# Patient Record
Sex: MALE | Race: WHITE | NOT HISPANIC OR LATINO | Employment: OTHER | URBAN - METROPOLITAN AREA
[De-identification: names, ages, dates, MRNs, and addresses within clinical notes are randomized per-mention and may not be internally consistent; named-entity substitution may affect disease eponyms.]

---

## 2017-12-19 ENCOUNTER — TELEPHONE (OUTPATIENT)
Dept: ADMINISTRATIVE | Facility: CLINIC | Age: 77
End: 2017-12-19

## 2018-01-22 ENCOUNTER — TELEPHONE (OUTPATIENT)
Dept: FAMILY MEDICINE | Facility: CLINIC | Age: 78
End: 2018-01-22

## 2018-01-22 NOTE — TELEPHONE ENCOUNTER
Spoke with Danisha and let her we need to see the pt , he has not been seen since 2015.   She will call the pt's daughter and let her know

## 2018-01-22 NOTE — TELEPHONE ENCOUNTER
----- Message from Mitra Walters sent at 1/22/2018  2:37 PM CST -----  Contact: [facility]  [phone] - Danisha Jhaveri ,,,, following up on a medical request calling for a order for hospice ,,daughter is in from out of town and wants to get his done,,,,,,Please call back at

## 2018-01-24 ENCOUNTER — TELEPHONE (OUTPATIENT)
Dept: FAMILY MEDICINE | Facility: CLINIC | Age: 78
End: 2018-01-24

## 2018-01-24 NOTE — TELEPHONE ENCOUNTER
----- Message from Juanita Alcaraz sent at 1/24/2018  2:02 PM CST -----  [facility]  [facility]'s calling stating that pt's heart rate was higher than normal today at 126, and bp at 140/90, she stated that currently he is at 108, would also like a order for a  for pt, please advise [phone]

## 2018-01-24 NOTE — TELEPHONE ENCOUNTER
I know these, I have told them that. Christi has been working on them as well. The daughter just came into town today

## 2018-01-24 NOTE — TELEPHONE ENCOUNTER
Spoke with Christi and she says that the pt's heartrate was more elevated today than usual , it was 126 but his daughter was there and she was telling him that he needed to come see us so we could put him in the nursing home. He did not like that and became very agitated. Christi had him take an Ativan that he has for anxiety and he was feeling better and HR was 94 when she left   Still trying to get  Him to come in for a visit ,l his daughter is having a hard time trying to decide between hospice and nursing home, they are requesting a  consult

## 2018-01-26 ENCOUNTER — TELEPHONE (OUTPATIENT)
Dept: FAMILY MEDICINE | Facility: CLINIC | Age: 78
End: 2018-01-26

## 2018-01-26 NOTE — TELEPHONE ENCOUNTER
----- Message from Nancy Canada sent at 1/26/2018  8:45 AM CST -----  Contact: Rick - daughter  Asked that the pt is worked in the afternoon for a F/U/Regarding pt going on Hospice appt, the pt daughter can be reached at  [phone] or [phone]///[email or web address]

## 2018-01-26 NOTE — TELEPHONE ENCOUNTER
Spoke with patients daughter and informed her he will need an appointment for evaluation for hospice. Verbalized understanding. Appointment scheduled.

## 2018-01-29 ENCOUNTER — OFFICE VISIT (OUTPATIENT)
Dept: FAMILY MEDICINE | Facility: CLINIC | Age: 78
End: 2018-01-29
Payer: MEDICARE

## 2018-01-29 VITALS
TEMPERATURE: 97 F | DIASTOLIC BLOOD PRESSURE: 62 MMHG | SYSTOLIC BLOOD PRESSURE: 112 MMHG | HEART RATE: 94 BPM | OXYGEN SATURATION: 97 %

## 2018-01-29 DIAGNOSIS — E66.01 MORBID OBESITY: Chronic | ICD-10-CM

## 2018-01-29 DIAGNOSIS — I69.30 HISTORY OF CEREBROVASCULAR ACCIDENT (CVA) WITH RESIDUAL DEFICIT: ICD-10-CM

## 2018-01-29 DIAGNOSIS — I27.9 CHRONIC PULMONARY HEART DISEASE: Chronic | ICD-10-CM

## 2018-01-29 DIAGNOSIS — F41.9 ANXIETY: ICD-10-CM

## 2018-01-29 DIAGNOSIS — Z12.11 COLON CANCER SCREENING: ICD-10-CM

## 2018-01-29 DIAGNOSIS — J44.9 PULMONARY HYPERTENSION DUE TO CHRONIC OBSTRUCTIVE PULMONARY DISEASE: Primary | Chronic | ICD-10-CM

## 2018-01-29 DIAGNOSIS — I27.23 PULMONARY HYPERTENSION DUE TO CHRONIC OBSTRUCTIVE PULMONARY DISEASE: Primary | Chronic | ICD-10-CM

## 2018-01-29 DIAGNOSIS — I70.0 ATHEROSCLEROSIS OF AORTA: Chronic | ICD-10-CM

## 2018-01-29 DIAGNOSIS — F10.11 ALCOHOL ABUSE, IN REMISSION: ICD-10-CM

## 2018-01-29 DIAGNOSIS — I51.89 LEFT VENTRICULAR DIASTOLIC DYSFUNCTION WITH PRESERVED SYSTOLIC FUNCTION: Chronic | ICD-10-CM

## 2018-01-29 PROCEDURE — 1126F AMNT PAIN NOTED NONE PRSNT: CPT | Mod: S$GLB,,, | Performed by: FAMILY MEDICINE

## 2018-01-29 PROCEDURE — 99214 OFFICE O/P EST MOD 30 MIN: CPT | Mod: S$GLB,,, | Performed by: FAMILY MEDICINE

## 2018-01-29 PROCEDURE — 99499 UNLISTED E&M SERVICE: CPT | Mod: S$GLB,,, | Performed by: FAMILY MEDICINE

## 2018-01-29 PROCEDURE — 99999 PR PBB SHADOW E&M-EST. PATIENT-LVL II: CPT | Mod: PBBFAC,,, | Performed by: FAMILY MEDICINE

## 2018-01-29 PROCEDURE — 1159F MED LIST DOCD IN RCRD: CPT | Mod: S$GLB,,, | Performed by: FAMILY MEDICINE

## 2018-01-29 NOTE — PROGRESS NOTES
Chief Complaint:    Chief Complaint   Patient presents with    Discuss Hospice       History of Present Illness:  Patient presents today after nearly 2 years to discuss hospice care.  He has been under hospice care on some time, he does have a diagnosis of diastolic dysfunction with preserved systolic function, COPD, history of CVA affecting the left side, he did have atrial fibrillation and used to be on anticoagulation but he's not on any anticoagulation for some reason.    He does have history of alcohol abuse but he says he is not drinking any this time.          ROS:  Review of Systems   Constitutional: Negative for activity change, chills, fatigue, fever and unexpected weight change.   HENT: Negative for congestion, ear discharge, ear pain, hearing loss, postnasal drip and rhinorrhea.    Eyes: Negative for pain and visual disturbance.   Respiratory: Negative for cough, chest tightness and shortness of breath.    Cardiovascular: Negative for chest pain and palpitations.   Gastrointestinal: Negative for abdominal pain, diarrhea and vomiting.   Endocrine: Negative for heat intolerance.   Genitourinary: Negative for dysuria, flank pain, frequency and hematuria.   Musculoskeletal: Negative for back pain, gait problem and neck pain.   Skin: Negative for color change and rash.   Neurological: Negative for dizziness, tremors, seizures, numbness and headaches.   Psychiatric/Behavioral: Negative for agitation, hallucinations, self-injury, sleep disturbance and suicidal ideas. The patient is not nervous/anxious.        Past Medical History:   Diagnosis Date    *Atrial fibrillation     Anemia due to blood loss, chronic 10/15/2013    Anxiety     Bilateral carotid artery disease 6/2/2016    CHF (congestive heart failure)     Depression     Hyperlipidemia     Hypertension     Prostate hypertrophy     Sleep apnea 9/12/2013    Stroke     Venous insufficiency 9/12/2013       Social History:  Social History      Social History    Marital status:      Spouse name: N/A    Number of children: N/A    Years of education: N/A     Social History Main Topics    Smoking status: Former Smoker     Packs/day: 0.50     Years: 20.00     Types: Cigarettes     Quit date: 1/1/1998    Smokeless tobacco: Never Used    Alcohol use Yes      Comment: social use of alcohol    Drug use: No    Sexual activity: Not Currently     Birth control/ protection: None     Other Topics Concern    None     Social History Narrative    None       Family History:   family history includes Diabetes in his mother, sister, sister, and sister; Heart disease in his mother; Hypertension in his brother.    Health Maintenance   Topic Date Due    TETANUS VACCINE  03[birth year]11/1958    Zoster Vaccine  03/11/2000    Colonoscopy  10/18/2014    Influenza Vaccine  08/01/2017    Lipid Panel  03/09/2021    Pneumococcal (65+)  Completed       Physical Exam:    Vital Signs  Temp: 96.6 °F (35.9 °C)  Temp src: Tympanic  Pulse: 94  SpO2: 97 %  BP: 112/62  BP Location: Right arm  Patient Position: Sitting  Pain Score: 0-No pain]    There is no height or weight on file to calculate BMI.    Physical Exam   Constitutional: He appears well-developed.   HENT:   Mouth/Throat: Oropharynx is clear and moist.   Eyes: Conjunctivae are normal. Pupils are equal, round, and reactive to light.   Neck: Normal range of motion. Neck supple.   Cardiovascular: Normal rate, regular rhythm and normal heart sounds.    No murmur heard.  Pulmonary/Chest: Effort normal and breath sounds normal. No respiratory distress. He has no wheezes. He has no rales. He exhibits no tenderness.   Abdominal: Soft. He exhibits no distension and no mass. There is no tenderness. There is no guarding.   Musculoskeletal: He exhibits no edema or tenderness.   Patient has impaired mobility uses wheelchair for the most part, high risk for falls without the wheelchair.   Lymphadenopathy:     He has no cervical  adenopathy.   Neurological: He is alert. He has normal reflexes.   Some spasticity involving the left upper extremity and weakness involving the left upper and lower extremity.   Skin: Skin is warm and dry.   Psychiatric: He has a normal mood and affect. His behavior is normal. Judgment and thought content normal.       Lab Results   Component Value Date    CHOL 145 03/09/2016    CHOL 151 11/16/2015    CHOL 131 03/19/2015    TRIG 102 03/09/2016    TRIG 70 11/16/2015    TRIG 72 06/09/2015    HDL 38 (L) 03/09/2016    HDL 36 (L) 11/16/2015    HDL 37 (L) 03/19/2015    TOTALCHOLEST 3.8 03/09/2016    TOTALCHOLEST 4.2 11/16/2015    TOTALCHOLEST 3.5 03/19/2015    NONHDLCHOL 107 03/09/2016    NONHDLCHOL 115 11/16/2015    NONHDLCHOL 94 03/19/2015       Lab Results   Component Value Date    HGBA1C 6.3 (H) 03/19/2015       Assessment:      ICD-10-CM ICD-9-CM   1. Pulmonary hypertension due to chronic obstructive pulmonary disease I27.23 416.8    J44.9 496   2. Left ventricular diastolic dysfunction with preserved systolic function I51.9 428.30   3. Atherosclerosis of aorta I70.0 440.0   4. Colon cancer screening Z12.11 V76.51   5. Anxiety F41.9 300.00   6. Alcohol abuse, in remission F10.11 305.03   7. Chronic pulmonary heart disease I27.9 416.9   8. Morbid obesity E66.01 278.01   9. History of cerebrovascular accident (CVA) with residual deficit I69.30 438.9         Plan:  Patient has several comorbid conditions requiring constant care especially with his limited mobility and weakness involving the left side due to CVA, COPD, diastolic heart failure he will need some assistance with activities of daily living.  I will see why he would be a hospice candidate however.  Home health would be appropriate in this individual to monitor him vital signs, he will need a aide to help with activities of daily living.  We'll check labs  Orders Placed This Encounter   Procedures    Fecal Immunochemical Test (iFOBT)    CBC auto  differential    Comprehensive metabolic panel    Lipid panel    Brain natriuretic peptide       Current Outpatient Prescriptions   Medication Sig Dispense Refill    aspirin (ECOTRIN) 81 MG EC tablet Take 1 tablet (81 mg total) by mouth once daily. (Patient taking differently: Take 325 mg by mouth once daily. ) 30 tablet 0    diltiazem (CARDIZEM CD) 120 MG Cp24 Take 1 capsule (120 mg total) by mouth once daily. 90 capsule 3    furosemide (LASIX) 40 MG tablet Take 1 tablet (40 mg total) by mouth daily as needed. (Patient taking differently: Take 40 mg by mouth once daily. ) 20 tablet 0    lorazepam (ATIVAN) 1 MG tablet Take 1 tablet by mouth every 6 (six) hours as needed.  0    oxybutynin (DITROPAN-XL) 10 MG 24 hr tablet Take 1 tablet (10 mg total) by mouth once daily. (Patient taking differently: Take 10 mg by mouth once daily. ) 30 tablet 11    sertraline (ZOLOFT) 50 MG tablet Take 1 tablet (50 mg total) by mouth once daily. 90 tablet 3    folic acid (FOLVITE) 1 MG tablet Take 1 tablet (1 mg total) by mouth once daily. 30 tablet 0    OXYGEN-AIR DELIVERY SYSTEMS [facility]        No current facility-administered medications for this visit.        Medications Discontinued During This Encounter   Medication Reason    DILT- mg CDCR Duplicate Order    tamsulosin (FLOMAX) 0.4 mg Cp24 Patient no longer taking    zinc sulfate (ZINCATE) 220 (50) mg capsule Patient no longer taking    thiamine 100 MG tablet Patient no longer taking    pravastatin (PRAVACHOL) 40 MG tablet Patient no longer taking    multivitamin (THERAGRAN) tablet Patient no longer taking    miconazole NITRATE 2 % (MICOTIN) 2 % top powder Patient no longer taking    fosinopril (MONOPRIL) 20 MG tablet Patient no longer taking    atenolol (TENORMIN) 100 MG tablet Patient no longer taking       No Follow-up on file.      Addi Saldaña MD

## 2018-01-30 ENCOUNTER — LAB VISIT (OUTPATIENT)
Dept: LAB | Facility: HOSPITAL | Age: 78
End: 2018-01-30
Attending: FAMILY MEDICINE
Payer: MEDICARE

## 2018-01-30 ENCOUNTER — CLINICAL SUPPORT (OUTPATIENT)
Dept: FAMILY MEDICINE | Facility: CLINIC | Age: 78
End: 2018-01-30
Payer: MEDICARE

## 2018-01-30 DIAGNOSIS — J44.9 PULMONARY HYPERTENSION DUE TO CHRONIC OBSTRUCTIVE PULMONARY DISEASE: Chronic | ICD-10-CM

## 2018-01-30 DIAGNOSIS — Z12.11 COLON CANCER SCREENING: ICD-10-CM

## 2018-01-30 DIAGNOSIS — I27.23 PULMONARY HYPERTENSION DUE TO CHRONIC OBSTRUCTIVE PULMONARY DISEASE: Chronic | ICD-10-CM

## 2018-01-30 LAB
ALBUMIN SERPL BCP-MCNC: 3.1 G/DL
ALP SERPL-CCNC: 77 U/L
ALT SERPL W/O P-5'-P-CCNC: 17 U/L
ANION GAP SERPL CALC-SCNC: 8 MMOL/L
AST SERPL-CCNC: 17 U/L
BASOPHILS # BLD AUTO: 0.02 K/UL
BASOPHILS NFR BLD: 0.4 %
BILIRUB SERPL-MCNC: 0.8 MG/DL
BUN SERPL-MCNC: 14 MG/DL
CALCIUM SERPL-MCNC: 8.7 MG/DL
CHLORIDE SERPL-SCNC: 104 MMOL/L
CHOLEST SERPL-MCNC: 194 MG/DL
CHOLEST/HDLC SERPL: 4.7 {RATIO}
CO2 SERPL-SCNC: 28 MMOL/L
CREAT SERPL-MCNC: 0.9 MG/DL
DIFFERENTIAL METHOD: ABNORMAL
EOSINOPHIL # BLD AUTO: 0.1 K/UL
EOSINOPHIL NFR BLD: 2.6 %
ERYTHROCYTE [DISTWIDTH] IN BLOOD BY AUTOMATED COUNT: 14.5 %
EST. GFR  (AFRICAN AMERICAN): >60 ML/MIN/1.73 M^2
EST. GFR  (NON AFRICAN AMERICAN): >60 ML/MIN/1.73 M^2
GLUCOSE SERPL-MCNC: 136 MG/DL
HCT VFR BLD AUTO: 43.7 %
HDLC SERPL-MCNC: 41 MG/DL
HDLC SERPL: 21.1 %
HGB BLD-MCNC: 14.5 G/DL
IMM GRANULOCYTES # BLD AUTO: 0.01 K/UL
IMM GRANULOCYTES NFR BLD AUTO: 0.2 %
LDLC SERPL CALC-MCNC: 135.8 MG/DL
LYMPHOCYTES # BLD AUTO: 1.4 K/UL
LYMPHOCYTES NFR BLD: 25.2 %
MCH RBC QN AUTO: 31.4 PG
MCHC RBC AUTO-ENTMCNC: 33.2 G/DL
MCV RBC AUTO: 95 FL
MONOCYTES # BLD AUTO: 0.5 K/UL
MONOCYTES NFR BLD: 9.1 %
NEUTROPHILS # BLD AUTO: 3.4 K/UL
NEUTROPHILS NFR BLD: 62.5 %
NONHDLC SERPL-MCNC: 153 MG/DL
NRBC BLD-RTO: 0 /100 WBC
PLATELET # BLD AUTO: 160 K/UL
PMV BLD AUTO: 12.1 FL
POTASSIUM SERPL-SCNC: 4 MMOL/L
PROT SERPL-MCNC: 7.7 G/DL
RBC # BLD AUTO: 4.62 M/UL
SODIUM SERPL-SCNC: 140 MMOL/L
TRIGL SERPL-MCNC: 86 MG/DL
WBC # BLD AUTO: 5.48 K/UL

## 2018-01-30 PROCEDURE — 36415 COLL VENOUS BLD VENIPUNCTURE: CPT | Mod: PO

## 2018-01-30 PROCEDURE — 82274 ASSAY TEST FOR BLOOD FECAL: CPT

## 2018-01-30 PROCEDURE — 90662 IIV NO PRSV INCREASED AG IM: CPT | Mod: S$GLB,,, | Performed by: FAMILY MEDICINE

## 2018-01-30 PROCEDURE — G0008 ADMIN INFLUENZA VIRUS VAC: HCPCS | Mod: S$GLB,,, | Performed by: FAMILY MEDICINE

## 2018-01-30 PROCEDURE — 80053 COMPREHEN METABOLIC PANEL: CPT

## 2018-01-30 PROCEDURE — 80061 LIPID PANEL: CPT

## 2018-01-30 PROCEDURE — 85025 COMPLETE CBC W/AUTO DIFF WBC: CPT

## 2018-01-30 PROCEDURE — 83880 ASSAY OF NATRIURETIC PEPTIDE: CPT

## 2018-01-30 NOTE — PROGRESS NOTES
High dose fluzone given IM rt deltoid , pt tolerated well ,Recommended 15 minute wait to watch for adverse reactions

## 2018-01-31 LAB — BNP SERPL-MCNC: 99 PG/ML

## 2018-02-02 LAB — HEMOCCULT STL QL IA: NEGATIVE

## 2018-02-05 ENCOUNTER — TELEPHONE (OUTPATIENT)
Dept: FAMILY MEDICINE | Facility: CLINIC | Age: 78
End: 2018-02-05

## 2018-02-05 DIAGNOSIS — G89.29 OTHER CHRONIC PAIN: Primary | ICD-10-CM

## 2018-02-05 NOTE — TELEPHONE ENCOUNTER
I need to know what is the terminal condition that she thinks this patient has?  I have not seen any terminal condition that would justify hospice care.

## 2018-02-05 NOTE — TELEPHONE ENCOUNTER
----- Message from Socorro Canada sent at 2/5/2018  7:33 AM CST -----  Contact: pt daughter-Marialuisa  Calling to get test results. Please adv/call [phone].//thanks. cw

## 2018-02-05 NOTE — TELEPHONE ENCOUNTER
Daughter is still requesting hospice , she believes that he is terminal and needs a 24 hour sitters to take care of him and you need to order it, she will only be here a couple of more days and needs this squared away

## 2018-02-06 NOTE — TELEPHONE ENCOUNTER
That by itself is not a hospice diagnoses and is not a terminal condition.  His heart failure is compensated.    Hospice is not provided because 1 is not able to take care of himself, the best option would be to go to a nursing home.    This time he has no justifiable cause for hospice.  We certainly cannot lie.

## 2018-02-06 NOTE — TELEPHONE ENCOUNTER
----- Message from Mitra Walters sent at 2/6/2018  2:00 PM CST -----  Contact: pt daughter  Pt daughter Marialuisa and  Paulino would like to come in and speak with dr about fathers care,,, please call pt back at [phone]

## 2018-02-07 NOTE — TELEPHONE ENCOUNTER
"Spoke with patient's daughter, Marialuisa, at #[phone], relaying Dr. Saldaña's instructions that the patient not take a "handful of ibuprofen" daily, that the patient may "take up Tylenol no more than 5 tablets of 500 mg each per day." Patient's daughter verbalized understanding. Patient's daughter requests that the patient be referred to pain management.  "

## 2018-02-07 NOTE — TELEPHONE ENCOUNTER
"Spoke with pt's daughter and let her know that hospice would be coming out to do an assessment. She voiced understanding     Now c/o pt being in constant pain. She says that he takes "handful" of Ibuprofen every day.   She thinks he needs pain medication   Please advise    "

## 2018-02-07 NOTE — TELEPHONE ENCOUNTER
We need to have a hospice company going doing evaluation on the patient and explained to them why this patient may not qualify for hospice.  There is nothing more that I can offer here.    If they have trouble taking care of the patient the need to think about nursing home.

## 2018-02-07 NOTE — TELEPHONE ENCOUNTER
"He should not take, "handful of ibuprofen" daily.  He can take up Tylenol no more than 5 tablets of 500 mg each per day.  If he needs stronger pain medicines like narcotics will have to send him to pain management.  "

## 2018-02-09 ENCOUNTER — TELEPHONE (OUTPATIENT)
Dept: FAMILY MEDICINE | Facility: CLINIC | Age: 78
End: 2018-02-09

## 2018-02-09 NOTE — TELEPHONE ENCOUNTER
----- Message from Catherine Walters sent at 2/9/2018 11:43 AM CST -----  Contact: pt daughter Marialuisa  Please please give her a call back about resending referral to [facility] in [place] for them to come out and evaluate the pt care and something went wrong and she apologizes so much and please advise [phone]

## 2018-02-09 NOTE — TELEPHONE ENCOUNTER
----- Message from Vineet Lion sent at 2/9/2018 10:41 AM CST -----  Contact: jacob mullen/ [facility]  She's calling in regards to orders to have pt's evaluation, she also needs pt's most recent clinical notes, pls fax this to: [phone]/ ph# [phone] (cell)

## 2018-02-22 ENCOUNTER — TELEPHONE (OUTPATIENT)
Dept: FAMILY MEDICINE | Facility: CLINIC | Age: 78
End: 2018-02-22

## 2018-02-22 NOTE — TELEPHONE ENCOUNTER
----- Message from Urszula Teran sent at 2/22/2018 12:35 PM CST -----  Contact: casey-daughter  needs callback, will elaborate...[phone]

## 2018-02-22 NOTE — TELEPHONE ENCOUNTER
Spoke with Christi  nurse and she says that the pt has a new wound on his lower leg, she said it is 0.2cm X 0.5cm , no depth to it.  It is not infected , no sign of redness or swelling.    She cleaned it and put bactroban ointment , non-stick gauze and 4X4's

## 2018-02-22 NOTE — TELEPHONE ENCOUNTER
He needs to be referred to wound care.    Patient's heart rate needs to be rechecked, is running fever?  Does he have any other symptoms?  Need to know.

## 2018-02-22 NOTE — TELEPHONE ENCOUNTER
----- Message from Racquel Garnica sent at 2/22/2018 12:12 PM CST -----  Contact: Christi with [facility]   Christi called and stated she needed to speak to the nurse. She stated that the pt has a new would on his left lower leg and she needs a wound care order. She also stated that the pt has a heart rate of 120. She can be reached at [phone].    Thanks,  TF

## 2018-02-22 NOTE — TELEPHONE ENCOUNTER
----- Message from Ragini Mai sent at 2/22/2018  2:50 PM CST -----  Contact: daughter  She's calling in regards to missed call pls call back at   [phone]

## 2018-02-26 ENCOUNTER — TELEPHONE (OUTPATIENT)
Dept: FAMILY MEDICINE | Facility: CLINIC | Age: 78
End: 2018-02-26

## 2018-02-26 RX ORDER — LORAZEPAM 1 MG/1
1 TABLET ORAL EVERY 6 HOURS PRN
Qty: 20 TABLET | Refills: 0 | Status: CANCELLED | OUTPATIENT
Start: 2018-02-26

## 2018-02-26 NOTE — TELEPHONE ENCOUNTER
Spoke with pt's daughter , Marialuisa , that is currently living with the pt. Is requesting pain medication , Norco 10/325mg to get him through until is pain management appointment     She is also requesting a refill on his Ativan , she is for his heart, and his heart rate has been 120, she says that he is in pain and he has been irritated with his wife not coming home because she had a massive stroke.     She says his ativan needs increased to 2 mg BID   He has 60 tablets filled on 2/12/18

## 2018-02-26 NOTE — TELEPHONE ENCOUNTER
----- Message from Roman Malik sent at 2/26/2018  4:08 PM CST -----  Contact: Pt  Please give pt daughter a call at [phone] regarding a call that was suppose to be made today.

## 2018-02-27 NOTE — TELEPHONE ENCOUNTER
We cannot continue his Ativan or his pain medication, he is not on hospice anymore. He needs  To have the hospice dr write him enough to wean off or he will go through bad withdrawals.    He needs to come in to be evaluated. We can't evaluate and treat over the phone.

## 2018-02-27 NOTE — TELEPHONE ENCOUNTER
Notified Marialuisa of advice. She stated that he dad does need the Ativan because he is going through so many life changes. I informed her that she will need to bring him in to discuss the medication concern with Dr. Guadarrama.  She will do the best she can to get him in the office. Appointment scheduled for Thursday because Christi will be making rounds tomorrow to see patient.

## 2018-02-27 NOTE — TELEPHONE ENCOUNTER
----- Message from Racquel Garnica sent at 2/27/2018 11:12 AM CST -----  Contact: Marialuisa Gagnon (Daughter)  Marialuisa called and stated she needed to speak to the nurse. She stated that the pt's medicine was not called in to the pharmacy. She can be reached at [phone].    Thanks,  Tf

## 2018-02-27 NOTE — TELEPHONE ENCOUNTER
Can can't refill norco, its a moustapha II controlled medication. He must get that from pain mgt.    He has been getting his lorazepam from a different doc and needs to go back to him. I can't give 60 lorazepam. Very addictive.    If his heart rate is running high, he needs to come in for an evaluation.

## 2018-02-27 NOTE — TELEPHONE ENCOUNTER
"I spoke with Marialuisa and let her know, no Norco, no increase in ativan , she says that the pt is taking the ativan 1 mg every 4 hours and at bedtime, this medication was coming from hospice doctor and he is no longer on hospice so she wants you to refill it.   I told her he should not be taking it that often. She said that he is anxious and going through a rough time.     She continues to talk about pt's heart rate being 120, I have told her on two other telephone calls that he needs to come in for an appt to evaluate his heart rate , she said that she cannot get him in here again, she brought him here once and we should have compassion and understand that it is hard for him to get out and go somewhere.       I spoke with pt's daughter, Kenya and she is trying to find somewhere for pt to live, she is not sure "which way to turn" pt is being evicted out of his current home, his wife of 40 years had a stroke and is now living with her children , house is in only her name and the family is evicting the pt and his daughter , Marialuisa   "

## 2018-02-27 NOTE — TELEPHONE ENCOUNTER
Ravindra's daughter has been notified of the advice per Dr. Saldaña concerning the medication. Appointment has been scheduled for evaluation.

## 2018-02-27 NOTE — TELEPHONE ENCOUNTER
----- Message from Racquel Garnica sent at 2/27/2018 11:12 AM CST -----  Contact: Marialuisa Gagnon (Daughter)  Marialuisa called and stated she needed to speak to the nurse. She stated that the pt's medicine was not called in to the pharmacy. She can be reached at [phone].    Thanks,  Tf

## 2018-02-28 ENCOUNTER — TELEPHONE (OUTPATIENT)
Dept: FAMILY MEDICINE | Facility: CLINIC | Age: 78
End: 2018-02-28

## 2018-02-28 NOTE — TELEPHONE ENCOUNTER
----- Message from Catherine Walters sent at 2/28/2018 11:44 AM CST -----  Contact: [facility] (Christi)  Calling in regards to update on heart rate and wound and please advise [phone]

## 2018-02-28 NOTE — TELEPHONE ENCOUNTER
Notified Christi of advice per Dr. Saldaña. Verbalized understanding and will contact patients daughter.

## 2018-02-28 NOTE — TELEPHONE ENCOUNTER
Spoke with Christi [facility] nurse, pt's heart rate is 120-130 and irregular.  She said that the wound on his leg now looks like something may have bit him, it is raised in the middle. And hard.   His temp was 98.3 at the visit but he had chills , she felt that he may have had a fever that broke before she came in.     He has an appointment to come in here to see us tomorrow     Please advise

## 2018-02-28 NOTE — TELEPHONE ENCOUNTER
He may have atrial fibrillation, need to take him to the ED today.  Is the wound infected? Needs to be looked at today.

## 2018-03-01 ENCOUNTER — PATIENT OUTREACH (OUTPATIENT)
Dept: ADMINISTRATIVE | Facility: HOSPITAL | Age: 78
End: 2018-03-01

## 2018-03-01 ENCOUNTER — TELEPHONE (OUTPATIENT)
Dept: FAMILY MEDICINE | Facility: CLINIC | Age: 78
End: 2018-03-01

## 2018-03-01 NOTE — TELEPHONE ENCOUNTER
----- Message from Catherine Walters sent at 3/1/2018 12:13 PM CST -----  Contact: pt daughter   Calling  about appointment for today and want to talk to you before appointment and please advise [phone]

## 2018-03-01 NOTE — TELEPHONE ENCOUNTER
Spoken with pt's daughter regarding her father's health. Daughter states that he is retaining a lot of fluid. I asked if she was giving him his lasix but she insist on the ativan for his fluid.

## 2018-03-08 ENCOUNTER — OFFICE VISIT (OUTPATIENT)
Dept: FAMILY MEDICINE | Facility: CLINIC | Age: 78
End: 2018-03-08
Payer: MEDICARE

## 2018-03-08 DIAGNOSIS — I69.30 HISTORY OF CEREBROVASCULAR ACCIDENT (CVA) WITH RESIDUAL DEFICIT: ICD-10-CM

## 2018-03-08 DIAGNOSIS — I51.89 LEFT VENTRICULAR DIASTOLIC DYSFUNCTION WITH PRESERVED SYSTOLIC FUNCTION: Chronic | ICD-10-CM

## 2018-03-08 DIAGNOSIS — Z78.9 DRINKS BEER: ICD-10-CM

## 2018-03-08 DIAGNOSIS — M17.11 PRIMARY OSTEOARTHRITIS OF RIGHT KNEE: Primary | ICD-10-CM

## 2018-03-08 DIAGNOSIS — J98.4 MIXED RESTRICTIVE AND OBSTRUCTIVE LUNG DISEASE: Chronic | ICD-10-CM

## 2018-03-08 DIAGNOSIS — J43.9 MIXED RESTRICTIVE AND OBSTRUCTIVE LUNG DISEASE: Chronic | ICD-10-CM

## 2018-03-08 PROCEDURE — 99499 UNLISTED E&M SERVICE: CPT | Mod: S$GLB,,, | Performed by: FAMILY MEDICINE

## 2018-03-08 PROCEDURE — 90471 IMMUNIZATION ADMIN: CPT | Mod: S$GLB,,, | Performed by: FAMILY MEDICINE

## 2018-03-08 PROCEDURE — 99999 PR PBB SHADOW E&M-EST. PATIENT-LVL II: CPT | Mod: PBBFAC,,, | Performed by: FAMILY MEDICINE

## 2018-03-08 PROCEDURE — 99214 OFFICE O/P EST MOD 30 MIN: CPT | Mod: 25,S$GLB,, | Performed by: FAMILY MEDICINE

## 2018-03-08 PROCEDURE — 90715 TDAP VACCINE 7 YRS/> IM: CPT | Mod: S$GLB,,, | Performed by: FAMILY MEDICINE

## 2018-03-08 NOTE — PROGRESS NOTES
Pt given Tdap vaccine IM right deltiod. Pt advised wait 15 minutes to observe for adverse reaction. Pt tolerated well.

## 2018-03-08 NOTE — PROGRESS NOTES
Chief Complaint:    Chief Complaint   Patient presents with    Follow-up       History of Present Illness:  He presents today for follow-up,  He has had a little ulcer on the leg that the daughter has been taking care of with topical antibiotic cream.  Denies any fever  Issue and is essentially wheelchair bound and has limited mobility of the chair he does complain of some knee pain off and on bilateral knees.  He also has atrial fibrillation which is compensated, there was a phone call 2 days back but patient started going high but it appears to be doing okay right now.  His blood pressure is normal  He is drinking some beer.  He does of heart failure which is compensated.  An chronic lung disease but he is not on  oxygen.      ROS:  Review of Systems   Constitutional: Negative for activity change, chills, fatigue, fever and unexpected weight change.   HENT: Negative for congestion, ear discharge, ear pain, hearing loss, postnasal drip and rhinorrhea.    Eyes: Negative for pain and visual disturbance.   Respiratory: Negative for cough, chest tightness and shortness of breath.    Cardiovascular: Negative for chest pain and palpitations.   Gastrointestinal: Negative for abdominal pain, diarrhea and vomiting.   Endocrine: Negative for heat intolerance.   Genitourinary: Negative for dysuria, flank pain, frequency and hematuria.   Musculoskeletal: Negative for back pain, gait problem and neck pain.   Skin: Negative for color change and rash.   Neurological: Negative for dizziness, tremors, seizures, numbness and headaches.   Psychiatric/Behavioral: Negative for agitation, hallucinations, self-injury, sleep disturbance and suicidal ideas. The patient is not nervous/anxious.        Past Medical History:   Diagnosis Date    *Atrial fibrillation     Anemia due to blood loss, chronic 10/15/2013    Anxiety     Bilateral carotid artery disease 6/2/2016    CHF (congestive heart failure)     Depression      Hyperlipidemia     Hypertension     Prostate hypertrophy     Sleep apnea 9/12/2013    Stroke     Venous insufficiency 9/12/2013       Social History:  Social History     Social History    Marital status:      Spouse name: N/A    Number of children: N/A    Years of education: N/A     Social History Main Topics    Smoking status: Former Smoker     Packs/day: 0.50     Years: 20.00     Types: Cigarettes     Quit date: 1/1/1998    Smokeless tobacco: Never Used    Alcohol use Yes      Comment: social use of alcohol    Drug use: No    Sexual activity: Not Currently     Birth control/ protection: None     Other Topics Concern    Not on file     Social History Narrative    No narrative on file       Family History:   family history includes Diabetes in his mother, sister, sister, and sister; Heart disease in his mother; Hypertension in his brother.    Health Maintenance   Topic Date Due    TETANUS VACCINE  [birth year]11/1958    Zoster Vaccine  03/11/2000    Fecal Occult Blood Test (FOBT)/[employer]  01/30/2019    Colonoscopy  03/08/2019    Lipid Panel  01/30/2023    Pneumococcal (65+)  Completed    Influenza Vaccine  Completed       Physical Exam:     ]    There is no height or weight on file to calculate BMI.    Physical Exam   Constitutional: He appears well-developed.   HENT:   Mouth/Throat: Oropharynx is clear and moist.   Eyes: Conjunctivae are normal. Pupils are equal, round, and reactive to light.   Neck: Normal range of motion. Neck supple.   Cardiovascular: Normal rate, regular rhythm and normal heart sounds.    No murmur heard.  Pulmonary/Chest: Effort normal and breath sounds normal. No respiratory distress. He has no wheezes. He has no rales. He exhibits no tenderness.   Abdominal: Soft. He exhibits no distension and no mass. There is no tenderness. There is no guarding.   Musculoskeletal: He exhibits no edema or tenderness.        Legs:  Patient has impaired mobility uses wheelchair for the most  part, high risk for falls without the wheelchair.   Lymphadenopathy:     He has no cervical adenopathy.   Neurological: He is alert. He has normal reflexes.   Some spasticity involving the left upper extremity and weakness involving the left upper and lower extremity.   Skin: Skin is warm and dry.   Psychiatric: He has a normal mood and affect. His behavior is normal. Judgment and thought content normal.       Lab Results   Component Value Date    CHOL 194 01/30/2018    CHOL 145 03/09/2016    CHOL 151 11/16/2015    TRIG 86 01/30/2018    TRIG 102 03/09/2016    TRIG 70 11/16/2015    HDL 41 01/30/2018    HDL 38 (L) 03/09/2016    HDL 36 (L) 11/16/2015    TOTALCHOLEST 4.7 01/30/2018    TOTALCHOLEST 3.8 03/09/2016    TOTALCHOLEST 4.2 11/16/2015    NONHDLCHOL 153 01/30/2018    NONHDLCHOL 107 03/09/2016    NONHDLCHOL 115 11/16/2015       Lab Results   Component Value Date    HGBA1C 6.3 (H) 03/19/2015       Assessment:      ICD-10-CM ICD-9-CM   1. Primary osteoarthritis of right knee M17.11 715.16   2. Drinks beer Z78.9 V49.89   3. 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 Left ventricular diastolic dysfunction with preserved systolic function I51.9 428.30         Plan:  Recommend using topical antibiotic cream, and compression stockings.  Patient is dependent legs predispose him to venous stasis in subsequent venous ulceration.  Please avoid alcohol  Daughter is requesting Ativan he was taking it every 4 hours as given by the hospice doctor previously he's been out of his Ativan for the past 2 days explained to the family that this is highly addictive medicine and I do not recommend indiscriminate use.  He is on his Zoloft for depression and anxiety that seems to be doing okay  Patient's lung disease appears to be stable.  According to daughter he appears to be back on hospice home unable to find appropriate reasoning for that.  Follow-up 6 months or sooner.  Orders Placed This Encounter    Procedures    (In Office Administered) Tdap Vaccine       Current Outpatient Prescriptions   Medication Sig Dispense Refill    aspirin (ECOTRIN) 81 MG EC tablet Take 1 tablet (81 mg total) by mouth once daily. (Patient taking differently: Take 325 mg by mouth once daily. ) 30 tablet 0    diltiazem (CARDIZEM CD) 120 MG Cp24 Take 1 capsule (120 mg total) by mouth once daily. 90 capsule 3    folic acid (FOLVITE) 1 MG tablet Take 1 tablet (1 mg total) by mouth once daily. 30 tablet 0    furosemide (LASIX) 40 MG tablet Take 1 tablet (40 mg total) by mouth daily as needed. (Patient taking differently: Take 40 mg by mouth once daily. ) 20 tablet 0    lorazepam (ATIVAN) 1 MG tablet Take 1 tablet by mouth every 6 (six) hours as needed.  0    oxybutynin (DITROPAN-XL) 10 MG 24 hr tablet Take 1 tablet (10 mg total) by mouth once daily. (Patient taking differently: Take 10 mg by mouth once daily. ) 30 tablet 11    OXYGEN-AIR DELIVERY SYSTEMS MISC       sertraline (ZOLOFT) 50 MG tablet Take 1 tablet (50 mg total) by mouth once daily. 90 tablet 3     No current facility-administered medications for this visit.        There are no discontinued medications.    No Follow-up on file.      Addi Saldaña MD

## 2018-03-22 ENCOUNTER — TELEPHONE (OUTPATIENT)
Dept: FAMILY MEDICINE | Facility: CLINIC | Age: 78
End: 2018-03-22

## 2018-03-22 NOTE — TELEPHONE ENCOUNTER
----- Message from Katie Mota sent at 3/22/2018 11:16 AM CDT -----  Contact: [facility]/Giselle  States she's calling regarding an order. Please call Giselle at [phone]. Thank you

## 2018-09-12 ENCOUNTER — PES CALL (OUTPATIENT)
Dept: ADMINISTRATIVE | Facility: CLINIC | Age: 78
End: 2018-09-12

## 2019-06-14 ENCOUNTER — TELEPHONE (OUTPATIENT)
Dept: CARDIOLOGY | Facility: CLINIC | Age: 79
End: 2019-06-14

## 2019-06-14 NOTE — TELEPHONE ENCOUNTER
----- Message from Shweta Huff sent at 6/14/2019 11:43 AM CDT -----  Contact: Katie - [facility]  Katie states that pt requesting referral to home health. Pt is no longer for hospice services. [phone]

## 2019-06-25 ENCOUNTER — LAB VISIT (OUTPATIENT)
Dept: LAB | Facility: HOSPITAL | Age: 79
End: 2019-06-25
Attending: FAMILY MEDICINE
Payer: MEDICARE

## 2019-06-25 ENCOUNTER — OFFICE VISIT (OUTPATIENT)
Dept: FAMILY MEDICINE | Facility: CLINIC | Age: 79
End: 2019-06-25
Payer: MEDICARE

## 2019-06-25 ENCOUNTER — TELEPHONE (OUTPATIENT)
Dept: FAMILY MEDICINE | Facility: CLINIC | Age: 79
End: 2019-06-25

## 2019-06-25 VITALS
TEMPERATURE: 100 F | SYSTOLIC BLOOD PRESSURE: 138 MMHG | HEART RATE: 98 BPM | OXYGEN SATURATION: 95 % | DIASTOLIC BLOOD PRESSURE: 80 MMHG

## 2019-06-25 DIAGNOSIS — E66.01 MORBID OBESITY: ICD-10-CM

## 2019-06-25 DIAGNOSIS — F10.11 ALCOHOL ABUSE, IN REMISSION: ICD-10-CM

## 2019-06-25 DIAGNOSIS — J98.4 MIXED RESTRICTIVE AND OBSTRUCTIVE LUNG DISEASE: Chronic | ICD-10-CM

## 2019-06-25 DIAGNOSIS — I69.30 HISTORY OF CEREBROVASCULAR ACCIDENT (CVA) WITH RESIDUAL DEFICIT: Primary | ICD-10-CM

## 2019-06-25 DIAGNOSIS — J44.9 PULMONARY HYPERTENSION DUE TO CHRONIC OBSTRUCTIVE PULMONARY DISEASE: ICD-10-CM

## 2019-06-25 DIAGNOSIS — I27.9 CHRONIC PULMONARY HEART DISEASE: ICD-10-CM

## 2019-06-25 DIAGNOSIS — J43.9 MIXED RESTRICTIVE AND OBSTRUCTIVE LUNG DISEASE: Chronic | ICD-10-CM

## 2019-06-25 DIAGNOSIS — I27.23 PULMONARY HYPERTENSION DUE TO CHRONIC OBSTRUCTIVE PULMONARY DISEASE: Chronic | ICD-10-CM

## 2019-06-25 DIAGNOSIS — J44.9 PULMONARY HYPERTENSION DUE TO CHRONIC OBSTRUCTIVE PULMONARY DISEASE: Chronic | ICD-10-CM

## 2019-06-25 DIAGNOSIS — F41.9 ANXIETY: ICD-10-CM

## 2019-06-25 DIAGNOSIS — I69.30 HISTORY OF CEREBROVASCULAR ACCIDENT (CVA) WITH RESIDUAL DEFICIT: ICD-10-CM

## 2019-06-25 DIAGNOSIS — I27.23 PULMONARY HYPERTENSION DUE TO CHRONIC OBSTRUCTIVE PULMONARY DISEASE: ICD-10-CM

## 2019-06-25 DIAGNOSIS — J43.9 MIXED RESTRICTIVE AND OBSTRUCTIVE LUNG DISEASE: ICD-10-CM

## 2019-06-25 DIAGNOSIS — I70.0 ATHEROSCLEROSIS OF AORTA: ICD-10-CM

## 2019-06-25 DIAGNOSIS — I69.354 HEMIPLEGIA AND HEMIPARESIS FOLLOWING CEREBRAL INFARCTION AFFECTING LEFT NON-DOMINANT SIDE: ICD-10-CM

## 2019-06-25 DIAGNOSIS — I51.89 LEFT VENTRICULAR DIASTOLIC DYSFUNCTION WITH PRESERVED SYSTOLIC FUNCTION: ICD-10-CM

## 2019-06-25 DIAGNOSIS — R53.81 DEBILITY: ICD-10-CM

## 2019-06-25 DIAGNOSIS — J98.4 MIXED RESTRICTIVE AND OBSTRUCTIVE LUNG DISEASE: ICD-10-CM

## 2019-06-25 LAB
ALBUMIN SERPL BCP-MCNC: 3.1 G/DL (ref 3.5–5.2)
ALP SERPL-CCNC: 64 U/L (ref 55–135)
ALT SERPL W/O P-5'-P-CCNC: 12 U/L (ref 10–44)
ANION GAP SERPL CALC-SCNC: 8 MMOL/L (ref 8–16)
AST SERPL-CCNC: 16 U/L (ref 10–40)
BASOPHILS # BLD AUTO: 0.02 K/UL (ref 0–0.2)
BASOPHILS NFR BLD: 0.5 % (ref 0–1.9)
BILIRUB SERPL-MCNC: 0.4 MG/DL (ref 0.1–1)
BUN SERPL-MCNC: 11 MG/DL (ref 8–23)
CALCIUM SERPL-MCNC: 8.6 MG/DL (ref 8.7–10.5)
CHLORIDE SERPL-SCNC: 106 MMOL/L (ref 95–110)
CHOLEST SERPL-MCNC: 149 MG/DL (ref 120–199)
CHOLEST/HDLC SERPL: 4.8 {RATIO} (ref 2–5)
CO2 SERPL-SCNC: 23 MMOL/L (ref 23–29)
CREAT SERPL-MCNC: 0.7 MG/DL (ref 0.5–1.4)
DIFFERENTIAL METHOD: ABNORMAL
EOSINOPHIL # BLD AUTO: 0.1 K/UL (ref 0–0.5)
EOSINOPHIL NFR BLD: 3.2 % (ref 0–8)
ERYTHROCYTE [DISTWIDTH] IN BLOOD BY AUTOMATED COUNT: 14 % (ref 11.5–14.5)
EST. GFR  (AFRICAN AMERICAN): >60 ML/MIN/1.73 M^2
EST. GFR  (NON AFRICAN AMERICAN): >60 ML/MIN/1.73 M^2
GLUCOSE SERPL-MCNC: 110 MG/DL (ref 70–110)
HCT VFR BLD AUTO: 39.1 % (ref 40–54)
HDLC SERPL-MCNC: 31 MG/DL (ref 40–75)
HDLC SERPL: 20.8 % (ref 20–50)
HGB BLD-MCNC: 13.5 G/DL (ref 14–18)
IMM GRANULOCYTES # BLD AUTO: 0.01 K/UL (ref 0–0.04)
IMM GRANULOCYTES NFR BLD AUTO: 0.2 % (ref 0–0.5)
LDLC SERPL CALC-MCNC: 102.6 MG/DL (ref 63–159)
LYMPHOCYTES # BLD AUTO: 1 K/UL (ref 1–4.8)
LYMPHOCYTES NFR BLD: 24.2 % (ref 18–48)
MCH RBC QN AUTO: 31.8 PG (ref 27–31)
MCHC RBC AUTO-ENTMCNC: 34.5 G/DL (ref 32–36)
MCV RBC AUTO: 92 FL (ref 82–98)
MONOCYTES # BLD AUTO: 0.3 K/UL (ref 0.3–1)
MONOCYTES NFR BLD: 8.3 % (ref 4–15)
NEUTROPHILS # BLD AUTO: 2.6 K/UL (ref 1.8–7.7)
NEUTROPHILS NFR BLD: 63.6 % (ref 38–73)
NONHDLC SERPL-MCNC: 118 MG/DL
NRBC BLD-RTO: 0 /100 WBC
PLATELET # BLD AUTO: 193 K/UL (ref 150–350)
PMV BLD AUTO: 11.8 FL (ref 9.2–12.9)
POTASSIUM SERPL-SCNC: 4 MMOL/L (ref 3.5–5.1)
PROT SERPL-MCNC: 7.7 G/DL (ref 6–8.4)
RBC # BLD AUTO: 4.24 M/UL (ref 4.6–6.2)
SODIUM SERPL-SCNC: 137 MMOL/L (ref 136–145)
TRIGL SERPL-MCNC: 77 MG/DL (ref 30–150)
WBC # BLD AUTO: 4.09 K/UL (ref 3.9–12.7)

## 2019-06-25 PROCEDURE — 3079F DIAST BP 80-89 MM HG: CPT | Mod: HCNC,CPTII,S$GLB, | Performed by: FAMILY MEDICINE

## 2019-06-25 PROCEDURE — 3075F PR MOST RECENT SYSTOLIC BLOOD PRESS GE 130-139MM HG: ICD-10-PCS | Mod: HCNC,CPTII,S$GLB, | Performed by: FAMILY MEDICINE

## 2019-06-25 PROCEDURE — 99499 RISK ADDL DX/OHS AUDIT: ICD-10-PCS | Mod: HCNC,S$GLB,, | Performed by: FAMILY MEDICINE

## 2019-06-25 PROCEDURE — 3079F PR MOST RECENT DIASTOLIC BLOOD PRESSURE 80-89 MM HG: ICD-10-PCS | Mod: HCNC,CPTII,S$GLB, | Performed by: FAMILY MEDICINE

## 2019-06-25 PROCEDURE — 3075F SYST BP GE 130 - 139MM HG: CPT | Mod: HCNC,CPTII,S$GLB, | Performed by: FAMILY MEDICINE

## 2019-06-25 PROCEDURE — 99214 OFFICE O/P EST MOD 30 MIN: CPT | Mod: HCNC,S$GLB,, | Performed by: FAMILY MEDICINE

## 2019-06-25 PROCEDURE — 80061 LIPID PANEL: CPT | Mod: HCNC

## 2019-06-25 PROCEDURE — 83880 ASSAY OF NATRIURETIC PEPTIDE: CPT | Mod: HCNC

## 2019-06-25 PROCEDURE — 99214 PR OFFICE/OUTPT VISIT, EST, LEVL IV, 30-39 MIN: ICD-10-PCS | Mod: HCNC,S$GLB,, | Performed by: FAMILY MEDICINE

## 2019-06-25 PROCEDURE — 99499 UNLISTED E&M SERVICE: CPT | Mod: HCNC,S$GLB,, | Performed by: FAMILY MEDICINE

## 2019-06-25 PROCEDURE — 80053 COMPREHEN METABOLIC PANEL: CPT | Mod: HCNC

## 2019-06-25 PROCEDURE — 3288F PR FALLS RISK ASSESSMENT DOCUMENTED: ICD-10-PCS | Mod: HCNC,CPTII,S$GLB, | Performed by: FAMILY MEDICINE

## 2019-06-25 PROCEDURE — 3288F FALL RISK ASSESSMENT DOCD: CPT | Mod: HCNC,CPTII,S$GLB, | Performed by: FAMILY MEDICINE

## 2019-06-25 PROCEDURE — 85025 COMPLETE CBC W/AUTO DIFF WBC: CPT | Mod: HCNC

## 2019-06-25 PROCEDURE — 99999 PR PBB SHADOW E&M-EST. PATIENT-LVL III: ICD-10-PCS | Mod: PBBFAC,HCNC,, | Performed by: FAMILY MEDICINE

## 2019-06-25 PROCEDURE — 1100F PR PT FALLS ASSESS DOC 2+ FALLS/FALL W/INJURY/YR: ICD-10-PCS | Mod: HCNC,CPTII,S$GLB, | Performed by: FAMILY MEDICINE

## 2019-06-25 PROCEDURE — 1100F PTFALLS ASSESS-DOCD GE2>/YR: CPT | Mod: HCNC,CPTII,S$GLB, | Performed by: FAMILY MEDICINE

## 2019-06-25 PROCEDURE — 99999 PR PBB SHADOW E&M-EST. PATIENT-LVL III: CPT | Mod: PBBFAC,HCNC,, | Performed by: FAMILY MEDICINE

## 2019-06-25 PROCEDURE — 36415 COLL VENOUS BLD VENIPUNCTURE: CPT | Mod: HCNC,PO

## 2019-06-25 RX ORDER — HYDROCODONE BITARTRATE AND ACETAMINOPHEN 10; 325 MG/1; MG/1
1 TABLET ORAL EVERY 4 HOURS PRN
COMMUNITY
End: 2020-01-20

## 2019-06-25 RX ORDER — NAPROXEN SODIUM 220 MG
220 TABLET ORAL 2 TIMES DAILY PRN
COMMUNITY

## 2019-06-25 NOTE — PROGRESS NOTES
History of Present Illness:    Patient is here with his daughter  He is off of hospice he was on hospice for few years.  Patient essentially stays in his wheelchair for the most part does not like to go out gets severe anxiety when he tries to go out.    He is not drinking alcohol lately.    Lung disease and pulmonary heart disease is also stable.    He is currently staying alone his daughter brings food and does the cooking and cleaning in the house.  She has concern that he may not be taking his medications properly.  They would like to get some home health involved.      ROS:  Review of Systems   Constitutional: Negative for activity change, chills, fatigue, fever and unexpected weight change.   HENT: Negative for congestion, ear discharge, ear pain, hearing loss, postnasal drip and rhinorrhea.    Eyes: Negative for pain and visual disturbance.   Respiratory: Negative for cough and chest tightness.    Cardiovascular: Negative for chest pain and palpitations.   Gastrointestinal: Negative for abdominal pain, diarrhea and vomiting.   Endocrine: Negative for heat intolerance.   Genitourinary: Negative for dysuria, flank pain, frequency and hematuria.   Musculoskeletal: Positive for gait problem. Negative for back pain and neck pain.   Skin: Negative for color change and rash.   Neurological: Negative for dizziness, tremors, seizures, numbness and headaches.   Psychiatric/Behavioral: Negative for agitation, hallucinations, self-injury, sleep disturbance and suicidal ideas. The patient is not nervous/anxious.        Past Medical History:   Diagnosis Date    *Atrial fibrillation     Anemia due to blood loss, chronic 10/15/2013    Anxiety     Bilateral carotid artery disease 6/2/2016    CHF (congestive heart failure)     Depression     Hyperlipidemia     Hypertension     Prostate hypertrophy     Sleep apnea 9/12/2013    Stroke     Venous insufficiency 9/12/2013       Social History:  Social History      Socioeconomic History    Marital status:      Spouse name: Not on file    Number of children: Not on file    Years of education: Not on file    Highest education level: Not on file   Occupational History    Not on file   Social Needs    Financial resource strain: Not on file    Food insecurity:     Worry: Not on file     Inability: Not on file    Transportation needs:     Medical: Not on file     Non-medical: Not on file   Tobacco Use    Smoking status: Former Smoker     Packs/day: 0.50     Years: 20.00     Pack years: 10.00     Types: Cigarettes     Last attempt to quit: 1998     Years since quittin.4    Smokeless tobacco: Never Used   Substance and Sexual Activity    Alcohol use: Yes     Comment: social use of alcohol    Drug use: No    Sexual activity: Not Currently     Birth control/protection: None   Lifestyle    Physical activity:     Days per week: Not on file     Minutes per session: Not on file    Stress: Not on file   Relationships    Social connections:     Talks on phone: Not on file     Gets together: Not on file     Attends Jew service: Not on file     Active member of club or organization: Not on file     Attends meetings of clubs or organizations: Not on file     Relationship status: Not on file   Other Topics Concern    Not on file   Social History Narrative    Not on file       Family History:   family history includes Diabetes in his mother, sister, sister, and sister; Heart disease in his mother; Hypertension in his brother.    Health Maintenance   Topic Date Due    Influenza Vaccine  2019    Lipid Panel  2023    TETANUS VACCINE  2028    Pneumococcal Vaccine (65+ Low/Medium Risk)  Completed    Abdominal Aortic Aneurysm Screening  Addressed       Physical Exam:    Vital Signs  Temp: 99.5 °F (37.5 °C)  Temp src: Tympanic  Pulse: 98  SpO2: 95 %  BP: 138/80  BP Location: Left arm  Patient Position: Sitting  Pain Score:   8  Pain Loc:  Knee]    There is no height or weight on file to calculate BMI.    Physical Exam   Constitutional: He is oriented to person, place, and time. He appears well-developed.   HENT:   Mouth/Throat: Oropharynx is clear and moist.   Eyes: Pupils are equal, round, and reactive to light. Conjunctivae are normal.   Neck: Normal range of motion. Neck supple.   Cardiovascular: Normal rate, regular rhythm and normal heart sounds.   No murmur heard.  Pulmonary/Chest: Effort normal and breath sounds normal. No respiratory distress. He has no wheezes. He has no rales. He exhibits no tenderness.   Abdominal: Soft. He exhibits no distension and no mass. There is no tenderness. There is no guarding.   Musculoskeletal: He exhibits edema (Mild edema bilateral legs).        Right knee: Tenderness found. Medial joint line tenderness noted.   Patient has an extremely weak and unsteady gait.    Patient is wheelchair-bound      He has significant weakness involving the left hand.      Strength of the bilateral foot is 4 over 5.          Left hand weakness 2 /5   Lymphadenopathy:     He has no cervical adenopathy.   Neurological: He is alert and oriented to person, place, and time.   Skin: Skin is warm and dry.   Psychiatric: He has a normal mood and affect. His behavior is normal. Judgment and thought content normal.         Labs:    Lab Results   Component Value Date    WBC 5.48 01/30/2018    HGB 14.5 01/30/2018    HCT 43.7 01/30/2018     01/30/2018    CHOL 194 01/30/2018    TRIG 86 01/30/2018    HDL 41 01/30/2018    ALT 17 01/30/2018    AST 17 01/30/2018     01/30/2018    K 4.0 01/30/2018     01/30/2018    CREATININE 0.9 01/30/2018    BUN 14 01/30/2018    CO2 28 01/30/2018    TSH 0.473 06/09/2015    PSA 0.65 03/19/2015    INR 1.0 03/26/2016    GLUF 107 10/08/2008    HGBA1C 6.3 (H) 03/19/2015           Assessment:      ICD-10-CM ICD-9-CM   1. History of cerebrovascular accident (CVA) with residual deficit I69.30 438.9    2. Anxiety F41.9 300.00   3. Alcohol abuse, in remission F10.11 305.03   4. 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 Pulmonary hypertension due to chronic obstructive pulmonary disease I27.23 416.8    J44.9 496   6. Morbid obesity E66.01 278.01   7. Chronic pulmonary heart disease I27.9 416.9   8. Hemiplegia and hemiparesis following cerebral infarction affecting left non-dominant side I69.354 438.22   9. Atherosclerosis of aorta I70.0 440.0       Plan:  Will continue current meds and plan  Will try to get home health for him.  Continue to abstain from alcohol  Urged compliance with medication pain  Check labs as below  Follow-up 6 months          Orders Placed This Encounter   Procedures    CBC auto differential    Comprehensive metabolic panel    Lipid panel    Brain natriuretic peptide       Current Outpatient Medications   Medication Sig Dispense Refill    aspirin (ECOTRIN) 81 MG EC tablet Take 1 tablet (81 mg total) by mouth once daily. 30 tablet 0    diltiazem (CARDIZEM CD) 120 MG Cp24 Take 1 capsule (120 mg total) by mouth once daily. 90 capsule 3    furosemide (LASIX) 40 MG tablet Take 1 tablet (40 mg total) by mouth daily as needed. (Patient taking differently: Take 40 mg by mouth once daily. ) 20 tablet 0    HYDROcodone-acetaminophen (NORCO)  mg per tablet Take 1 tablet by mouth every 4 (four) hours as needed for Pain.      lorazepam (ATIVAN) 1 MG tablet Take 1 tablet by mouth every 6 (six) hours as needed (1-2 tablets prn).   0    naproxen sodium (ANAPROX) 220 MG tablet Take 220 mg by mouth 2 (two) times daily as needed.      oxybutynin (DITROPAN-XL) 10 MG 24 hr tablet Take 1 tablet (10 mg total) by mouth once daily. (Patient taking differently: Take 10 mg by mouth once daily. ) 30 tablet 11    OXYGEN-AIR DELIVERY SYSTEMS [facility]        No current facility-administered medications for this visit.        Medications Discontinued During This Encounter    Medication Reason    folic acid (FOLVITE) 1 MG tablet Patient no longer taking    sertraline (ZOLOFT) 50 MG tablet Patient no longer taking       Follow up in about 6 months (around 12/25/2019).      Dr Addi Saldaña MD    Disclaimer: This note is prepared using voice recognition system and as such is likely to have errors and is not proof read.

## 2019-06-26 ENCOUNTER — TELEPHONE (OUTPATIENT)
Dept: FAMILY MEDICINE | Facility: CLINIC | Age: 79
End: 2019-06-26

## 2019-06-26 DIAGNOSIS — I51.89 LEFT VENTRICULAR DIASTOLIC DYSFUNCTION WITH PRESERVED SYSTOLIC FUNCTION: ICD-10-CM

## 2019-06-26 DIAGNOSIS — M17.11 PRIMARY OSTEOARTHRITIS OF RIGHT KNEE: ICD-10-CM

## 2019-06-26 DIAGNOSIS — J44.9 PULMONARY HYPERTENSION DUE TO CHRONIC OBSTRUCTIVE PULMONARY DISEASE: ICD-10-CM

## 2019-06-26 DIAGNOSIS — F10.11 ALCOHOL ABUSE, IN REMISSION: ICD-10-CM

## 2019-06-26 DIAGNOSIS — I69.354 HEMIPLEGIA AND HEMIPARESIS FOLLOWING CEREBRAL INFARCTION AFFECTING LEFT NON-DOMINANT SIDE: ICD-10-CM

## 2019-06-26 DIAGNOSIS — I27.9 CHRONIC PULMONARY HEART DISEASE: ICD-10-CM

## 2019-06-26 DIAGNOSIS — I69.30 HISTORY OF CEREBROVASCULAR ACCIDENT (CVA) WITH RESIDUAL DEFICIT: Primary | ICD-10-CM

## 2019-06-26 DIAGNOSIS — J43.9 MIXED RESTRICTIVE AND OBSTRUCTIVE LUNG DISEASE: ICD-10-CM

## 2019-06-26 DIAGNOSIS — I70.0 ATHEROSCLEROSIS OF AORTA: ICD-10-CM

## 2019-06-26 DIAGNOSIS — F41.9 ANXIETY: ICD-10-CM

## 2019-06-26 DIAGNOSIS — J98.4 MIXED RESTRICTIVE AND OBSTRUCTIVE LUNG DISEASE: ICD-10-CM

## 2019-06-26 DIAGNOSIS — R53.81 DEBILITY: ICD-10-CM

## 2019-06-26 DIAGNOSIS — E66.01 MORBID OBESITY: ICD-10-CM

## 2019-06-26 DIAGNOSIS — I27.23 PULMONARY HYPERTENSION DUE TO CHRONIC OBSTRUCTIVE PULMONARY DISEASE: ICD-10-CM

## 2019-06-26 LAB — BNP SERPL-MCNC: 95 PG/ML (ref 0–99)

## 2019-06-26 NOTE — TELEPHONE ENCOUNTER
Spoke with Tavia with [facility] and states they will not accept the patient at all due to non-compliance.    Will send the referral to another [facility].

## 2019-06-26 NOTE — TELEPHONE ENCOUNTER
----- Message from Amy Walters sent at 6/26/2019  9:59 AM CDT -----  Contact: Ms Squires- Home Health-[phone]  Would like to consult with the nurse, Ms Carter would like to speak with the nurse  About home health for the patient , they was not able to accepted  And would like to know if the patient can get someone else, any question please feel free to give her a call, please call back at [phone], thanks sj

## 2019-06-27 PROCEDURE — G0180 MD CERTIFICATION HHA PATIENT: HCPCS | Mod: ,,, | Performed by: FAMILY MEDICINE

## 2019-06-27 PROCEDURE — G0180 PR HOME HEALTH MD CERTIFICATION: ICD-10-PCS | Mod: ,,, | Performed by: FAMILY MEDICINE

## 2019-06-28 ENCOUNTER — TELEPHONE (OUTPATIENT)
Dept: FAMILY MEDICINE | Facility: CLINIC | Age: 79
End: 2019-06-28

## 2019-06-28 DIAGNOSIS — D64.9 ANEMIA, UNSPECIFIED TYPE: Primary | ICD-10-CM

## 2019-07-01 ENCOUNTER — TELEPHONE (OUTPATIENT)
Dept: FAMILY MEDICINE | Facility: CLINIC | Age: 79
End: 2019-07-01

## 2019-07-01 DIAGNOSIS — R53.81 DEBILITY: ICD-10-CM

## 2019-07-01 DIAGNOSIS — I69.30 HISTORY OF CEREBROVASCULAR ACCIDENT (CVA) WITH RESIDUAL DEFICIT: Primary | ICD-10-CM

## 2019-07-01 DIAGNOSIS — I27.9 CHRONIC PULMONARY HEART DISEASE: ICD-10-CM

## 2019-07-01 DIAGNOSIS — I69.354 HEMIPLEGIA AND HEMIPARESIS FOLLOWING CEREBRAL INFARCTION AFFECTING LEFT NON-DOMINANT SIDE: ICD-10-CM

## 2019-07-01 DIAGNOSIS — I51.89 LEFT VENTRICULAR DIASTOLIC DYSFUNCTION WITH PRESERVED SYSTOLIC FUNCTION: ICD-10-CM

## 2019-07-01 DIAGNOSIS — G89.29 OTHER CHRONIC PAIN: ICD-10-CM

## 2019-07-01 DIAGNOSIS — M17.11 PRIMARY OSTEOARTHRITIS OF RIGHT KNEE: ICD-10-CM

## 2019-07-01 DIAGNOSIS — J43.9 MIXED RESTRICTIVE AND OBSTRUCTIVE LUNG DISEASE: ICD-10-CM

## 2019-07-01 DIAGNOSIS — E66.01 MORBID OBESITY: Chronic | ICD-10-CM

## 2019-07-01 DIAGNOSIS — J98.4 MIXED RESTRICTIVE AND OBSTRUCTIVE LUNG DISEASE: ICD-10-CM

## 2019-07-01 NOTE — TELEPHONE ENCOUNTER
----- Message from Katie Mota sent at 7/1/2019  3:04 PM CDT -----  States she needs to speak to the nurse regarding some equipment he has a home and what equipment he may need. Please call Kenya Soriano at [phone]. Thank you

## 2019-07-01 NOTE — TELEPHONE ENCOUNTER
Requesting order for POV wheelchair     She also ask for an order for oxygen but there are no test to support that , he had it with hospice

## 2019-07-01 NOTE — TELEPHONE ENCOUNTER
Spoke with Kenya patient's daughter and since hospice has been canceled she needs us to order DME, power wheel chair and oxygen. She is going to buy a bed for him herself because the hospital beds are uncomfortable.   I will get these orders out .

## 2019-07-10 ENCOUNTER — EXTERNAL HOME HEALTH (OUTPATIENT)
Dept: HOME HEALTH SERVICES | Facility: HOSPITAL | Age: 79
End: 2019-07-10
Payer: MEDICARE

## 2019-07-22 DIAGNOSIS — I50.9 CONGESTIVE HEART FAILURE: ICD-10-CM

## 2019-07-22 DIAGNOSIS — N39.41 URGE INCONTINENCE OF URINE: ICD-10-CM

## 2019-07-22 RX ORDER — OXYBUTYNIN CHLORIDE 10 MG/1
10 TABLET, EXTENDED RELEASE ORAL DAILY
Qty: 90 TABLET | Refills: 3 | Status: SHIPPED | OUTPATIENT
Start: 2019-07-22

## 2019-07-22 RX ORDER — FUROSEMIDE 40 MG/1
40 TABLET ORAL DAILY PRN
Qty: 30 TABLET | Refills: 1 | Status: SHIPPED | OUTPATIENT
Start: 2019-07-22 | End: 2019-10-22 | Stop reason: SDUPTHER

## 2019-07-22 NOTE — TELEPHONE ENCOUNTER
----- Message from Nguyen Ngo sent at 7/22/2019 11:56 AM CDT -----  Contact: daughter/Kenya  Type:  RX Refill Request    Who Called: daughter  Refill or New Rx:Refill  RX Name and Strength:Furosemide 40mg, Oxycutyin 10mg  How is the patient currently taking it? (ex. 1XDay):1xday, 1xday  Is this a 30 day or 90 day RX:30  Preferred Pharmacy with phone number:[facility]/[place]  Local or Mail Order:Local  Ordering Provider:Dr Saldaña  Would the patient rather a call back or a response via [facility]? Call back  Best Call Back Number:[phone]  Additional Information: Kenya also need to speak to nurse regarding oxgyen

## 2019-07-31 ENCOUNTER — TELEPHONE (OUTPATIENT)
Dept: FAMILY MEDICINE | Facility: CLINIC | Age: 79
End: 2019-07-31

## 2019-07-31 DIAGNOSIS — I48.91 ATRIAL FIBRILLATION: ICD-10-CM

## 2019-07-31 RX ORDER — DILTIAZEM HYDROCHLORIDE 120 MG/1
120 CAPSULE, COATED, EXTENDED RELEASE ORAL DAILY
Qty: 90 CAPSULE | Refills: 0 | Status: SHIPPED | OUTPATIENT
Start: 2019-07-31 | End: 2020-02-14 | Stop reason: SDUPTHER

## 2019-07-31 NOTE — TELEPHONE ENCOUNTER
JESSE. Patients daughter had hospital bed and oxygen tank picked up. Patient does have home health and the nurse will monitor his O2 level.

## 2019-07-31 NOTE — TELEPHONE ENCOUNTER
----- Message from Lauren Ng sent at 7/31/2019 12:43 PM CDT -----  Type:  RX Refill Request    Who Called: Pt daughter (Kenya)  Refill or New Rx:    Refill   RX Name and Strength:   Diltiazem 120 mg tab  How is the patient currently taking it? (ex. 1XDay): takes once daily  Is this a 30 day or 90 day RX:    Preferred Pharmacy with phone number:  [facility] [place] in [place]  Local or Mail Order: Local   Ordering Provider:  Dr Saldaña  Would the patient rather a call back or a response via [facility]?   Call back  Best Call Back Number:  [phone]  Additional Information:  Please call when sent in//chris/louann

## 2019-07-31 NOTE — TELEPHONE ENCOUNTER
----- Message from Lauren Ng sent at 7/31/2019 12:50 PM CDT -----  Type:  Needs Medical Advice    Who Called:  Pt Daughter  (Kenya)  Symptoms (please be specific):     How long has patient had these symptoms:     Pharmacy name and phone #:     Would the patient rather a call back or a response via [facility]? Call back   Best Call Back Number:    [phone]  Additional Information:  States she would like to speak with your office regarding pt's at home kalee//please call//chris//louann

## 2019-08-01 ENCOUNTER — TELEPHONE (OUTPATIENT)
Dept: FAMILY MEDICINE | Facility: CLINIC | Age: 79
End: 2019-08-01

## 2019-08-01 DIAGNOSIS — D64.9 ANEMIA, UNSPECIFIED TYPE: Primary | ICD-10-CM

## 2019-08-01 NOTE — TELEPHONE ENCOUNTER
Patients daughter (Kenya)  stopped by here today asking if we can start filling his Lorazepam 0.5 mg. He was getting this through hospice . He was on Norco 10 mg BID  and Lorazepam 0.5 mg BID.  She isn't worried about the pain medication but she says he really needs the Lorazepam.   I pulled his  and he last got Lorazepam 0.5 mg 60 tablets 08/16/2018    I ask her about this because she was telling me that he doesn't take it twice a day every day but takes it every night for sure.   She filled his pill box for 2 weeks and he didn't have enough for every night .     I will put the  in your box so you can review it.   I did let her know if we do fill it , it will not be for 60 tablets, she voiced understanding

## 2019-08-02 NOTE — TELEPHONE ENCOUNTER
"Spoke with Oswald and he wanted to let me know that the patients daughters are fighting about his medications. The daughter that just came into town had gotten the lorazepam and "threw it away" because it was . After this happened Kenya the daughter that has been taking care of him went ahead and took the Ecru out of the house and took it to her house and will bring the patient medication if he needs it.   The sister that just came back  Into town is a recovering addict and has taken his medication before.     I have explained to Kenya his daughter that is acting as his caregiver at this time that Dr Saldaña agreed to give him 14 tablets of Lorazepam that will last 30 days, there will be NO early refills and if the medication is lost , stolen , taken , it will not be replaced.    I have encouraged her to keep the medication at her house or to get a safe for her fathers house to keep it in so it will not come up missing   "

## 2019-08-02 NOTE — TELEPHONE ENCOUNTER
----- Message from Satnam Stephens sent at 8/2/2019  1:33 PM CDT -----  .Type:  Patient Returning Call    Who Called: Oswald Hinson ([facility]N [facility] )   Who Left Message for Patient:  Does the patient know what this is regarding?: report   Would the patient rather a call back or a response via [facility]? Call back   Best Call Back Number:[phone]  Additional Information:  Oswald Hinson (LPN [facility] ) is requesting a call from nurse to discuss a family member of the pt has taken a narcotics prescription pills from the pt home. Family member is a recovering drug addict.

## 2019-08-04 RX ORDER — LORAZEPAM 1 MG/1
1 TABLET ORAL 2 TIMES DAILY PRN
Qty: 14 TABLET | Refills: 0 | Status: SHIPPED | OUTPATIENT
Start: 2019-08-04

## 2019-09-24 ENCOUNTER — OFFICE VISIT (OUTPATIENT)
Dept: FAMILY MEDICINE | Facility: CLINIC | Age: 79
End: 2019-09-24
Payer: MEDICARE

## 2019-09-24 VITALS
OXYGEN SATURATION: 96 % | TEMPERATURE: 99 F | DIASTOLIC BLOOD PRESSURE: 60 MMHG | HEART RATE: 103 BPM | SYSTOLIC BLOOD PRESSURE: 112 MMHG
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
## 2019-09-24 DIAGNOSIS — F10.11 ALCOHOL ABUSE, IN REMISSION: ICD-10-CM

## 2019-09-24 DIAGNOSIS — I69.354 HEMIPLEGIA AND HEMIPARESIS FOLLOWING CEREBRAL INFARCTION AFFECTING LEFT NON-DOMINANT SIDE: ICD-10-CM

## 2019-09-24 DIAGNOSIS — R41.0 CONFUSION: ICD-10-CM

## 2019-09-24 PROCEDURE — 99999 PR PBB SHADOW E&M-EST. PATIENT-LVL III: CPT | Mod: PBBFAC,HCNC,, | Performed by: FAMILY MEDICINE

## 2019-09-24 PROCEDURE — 1100F PR PT FALLS ASSESS DOC 2+ FALLS/FALL W/INJURY/YR: ICD-10-PCS | Mod: HCNC,CPTII,S$GLB, | Performed by: FAMILY MEDICINE

## 2019-09-24 PROCEDURE — 99999 PR PBB SHADOW E&M-EST. PATIENT-LVL III: ICD-10-PCS | Mod: PBBFAC,HCNC,, | Performed by: FAMILY MEDICINE

## 2019-09-24 PROCEDURE — 99214 PR OFFICE/OUTPT VISIT, EST, LEVL IV, 30-39 MIN: ICD-10-PCS | Mod: HCNC,S$GLB,, | Performed by: FAMILY MEDICINE

## 2019-09-24 PROCEDURE — 3074F SYST BP LT 130 MM HG: CPT | Mod: HCNC,CPTII,S$GLB, | Performed by: FAMILY MEDICINE

## 2019-09-24 PROCEDURE — 1100F PTFALLS ASSESS-DOCD GE2>/YR: CPT | Mod: HCNC,CPTII,S$GLB, | Performed by: FAMILY MEDICINE

## 2019-09-24 PROCEDURE — 3288F FALL RISK ASSESSMENT DOCD: CPT | Mod: HCNC,CPTII,S$GLB, | Performed by: FAMILY MEDICINE

## 2019-09-24 PROCEDURE — 3078F DIAST BP <80 MM HG: CPT | Mod: HCNC,CPTII,S$GLB, | Performed by: FAMILY MEDICINE

## 2019-09-24 PROCEDURE — 99214 OFFICE O/P EST MOD 30 MIN: CPT | Mod: HCNC,S$GLB,, | Performed by: FAMILY MEDICINE

## 2019-09-24 PROCEDURE — 3074F PR MOST RECENT SYSTOLIC BLOOD PRESSURE < 130 MM HG: ICD-10-PCS | Mod: HCNC,CPTII,S$GLB, | Performed by: FAMILY MEDICINE

## 2019-09-24 PROCEDURE — 3078F PR MOST RECENT DIASTOLIC BLOOD PRESSURE < 80 MM HG: ICD-10-PCS | Mod: HCNC,CPTII,S$GLB, | Performed by: FAMILY MEDICINE

## 2019-09-24 PROCEDURE — 3288F PR FALLS RISK ASSESSMENT DOCUMENTED: ICD-10-PCS | Mod: HCNC,CPTII,S$GLB, | Performed by: FAMILY MEDICINE

## 2019-09-24 NOTE — PROGRESS NOTES
History of Present Illness:    Patient is here with his daughter  Patient is by himself and his daughter lives close by she cooks and cleans for him.  She calls and checks on him every day  Couple days back when he did not respond she call emergency services and patient had sustained a fall at that time he was unable to tell how he fell and he was apparently confused when picked up.  He was was not taken to the ER he did not have any major injuries.  Daughters concern include that patient may have some memory loss and that he might be depressed.  He pretty much stays in the wheelchair.  Patient does have alcohol abuse history but we are daughter is unsure if he still drinking alcohol or not.  He had elderly protective Services called on him and do an evaluation but they do not know the outcome of that.    ROS:  Review of Systems   Constitutional: Negative for activity change, chills, fatigue, fever and unexpected weight change.   HENT: Negative for congestion, ear discharge, ear pain, hearing loss, postnasal drip and rhinorrhea.    Eyes: Negative for pain and visual disturbance.   Respiratory: Negative for cough and chest tightness.    Cardiovascular: Negative for chest pain and palpitations.   Gastrointestinal: Negative for abdominal pain, diarrhea and vomiting.   Endocrine: Negative for heat intolerance.   Genitourinary: Negative for dysuria, flank pain, frequency and hematuria.   Musculoskeletal: Negative for back pain and neck pain.   Skin: Negative for color change and rash.   Neurological: Negative for dizziness, tremors, seizures, numbness and headaches.   Psychiatric/Behavioral: Negative for agitation, hallucinations, self-injury, sleep disturbance and suicidal ideas. The patient is not nervous/anxious.        Past Medical History:   Diagnosis Date    *Atrial fibrillation     Anemia due to blood loss, chronic 10/15/2013    Anxiety     Bilateral carotid artery disease 6/2/2016    CHF (congestive  heart failure)     Depression     Hyperlipidemia     Hypertension     Prostate hypertrophy     Sleep apnea 2013    Stroke     Venous insufficiency 2013       Social History:  Social History     Socioeconomic History    Marital status:      Spouse name: Not on file    Number of children: Not on file    Years of education: Not on file    Highest education level: Not on file   Occupational History    Not on file   Social Needs    Financial resource strain: Not on file    Food insecurity:     Worry: Not on file     Inability: Not on file    Transportation needs:     Medical: Not on file     Non-medical: Not on file   Tobacco Use    Smoking status: Former Smoker     Packs/day: 0.50     Years: 20.00     Pack years: 10.00     Types: Cigarettes     Last attempt to quit: 1998     Years since quittin.7    Smokeless tobacco: Never Used   Substance and Sexual Activity    Alcohol use: Yes     Comment: social use of alcohol    Drug use: No    Sexual activity: Not Currently     Birth control/protection: None   Lifestyle    Physical activity:     Days per week: Not on file     Minutes per session: Not on file    Stress: Not on file   Relationships    Social connections:     Talks on phone: Not on file     Gets together: Not on file     Attends Nondenominational service: Not on file     Active member of club or organization: Not on file     Attends meetings of clubs or organizations: Not on file     Relationship status: Not on file   Other Topics Concern    Not on file   Social History Narrative    Not on file       Family History:   family history includes Diabetes in his mother, sister, sister, and sister; Heart disease in his mother; Hypertension in his brother.    Health Maintenance   Topic Date Due    Lipid Panel  2024    TETANUS VACCINE  2028    Pneumococcal Vaccine (65+ Low/Medium Risk)  Completed       Physical Exam:    Vital Signs  Temp: 98.8 °F (37.1 °C)  Temp src:  Temporal  Pulse: 103  SpO2: 96 %  BP: 112/60  BP Location: Right arm  Patient Position: Sitting  Pain Score: 0-No pain]    There is no height or weight on file to calculate BMI.    Physical Exam   Constitutional: He is oriented to person, place, and time. He appears well-developed.   HENT:   Mouth/Throat: Oropharynx is clear and moist.   Eyes: Pupils are equal, round, and reactive to light. Conjunctivae are normal.   Neck: Normal range of motion. Neck supple.   Cardiovascular: Normal rate, regular rhythm and normal heart sounds.   No murmur heard.  Pulmonary/Chest: Effort normal and breath sounds normal. No respiratory distress. He has no wheezes. He has no rales. He exhibits no tenderness.   Abdominal: Soft. He exhibits no distension and no mass. There is no tenderness. There is no guarding.   Musculoskeletal: He exhibits edema (Mild edema bilateral legs).        Right knee: Tenderness found. Medial joint line tenderness noted.   Patient has an extremely weak and unsteady gait.    Patient is wheelchair-bound      He has significant weakness involving the left hand.      Strength of the bilateral foot is 4 over 5.          Left hand weakness 2 /5   Lymphadenopathy:     He has no cervical adenopathy.   Neurological: He is alert and oriented to person, place, and time.   Skin: Skin is warm and dry.   Psychiatric: He has a normal mood and affect. His behavior is normal. Judgment and thought content normal.         Labs:    Lab Results   Component Value Date    WBC 4.09 06/25/2019    HGB 13.5 (L) 06/25/2019    HCT 39.1 (L) 06/25/2019     06/25/2019    CHOL 149 06/25/2019    TRIG 77 06/25/2019    HDL 31 (L) 06/25/2019    ALT 12 06/25/2019    AST 16 06/25/2019     06/25/2019    K 4.0 06/25/2019     06/25/2019    CREATININE 0.7 06/25/2019    BUN 11 06/25/2019    CO2 23 06/25/2019    TSH 0.473 06/09/2015    PSA 0.65 03/19/2015    INR 1.0 03/26/2016    GLUF 107 10/08/2008    HGBA1C 6.3 (H) 03/19/2015            Assessment:      ICD-10-CM ICD-9-CM   1. Fall, initial encounter W19.XXXA E888.9   2. Confusion R41.0 298.9   3. Alcohol abuse, in remission F10.11 305.03   4. Hemiplegia and hemiparesis following cerebral infarction affecting left non-dominant side I69.354 438.22       Plan:  Patient's exam remains unchanged and he does not have any visible injuries  He refused to be evaluated for memory loss and refuses any medication for depression.  Did advised daughter to ensure that he does not get any alcohol and will probably need more hands-on care specially when it comes to ambulation.  They will follow-up as needed        No orders of the defined types were placed in this encounter.      Current Outpatient Medications   Medication Sig Dispense Refill    aspirin (ECOTRIN) 81 MG EC tablet Take 1 tablet (81 mg total) by mouth once daily. 30 tablet 0    diltiaZEM (CARDIZEM CD) 120 MG Cp24 Take 1 capsule (120 mg total) by mouth once daily. 90 capsule 0    furosemide (LASIX) 40 MG tablet Take 1 tablet (40 mg total) by mouth daily as needed. 30 tablet 1    HYDROcodone-acetaminophen (NORCO)  mg per tablet Take 1 tablet by mouth every 4 (four) hours as needed for Pain.      LORazepam (ATIVAN) 1 MG tablet Take 1 tablet (1 mg total) by mouth 2 (two) times daily as needed for Anxiety. 14 tablet 0    naproxen sodium (ANAPROX) 220 MG tablet Take 220 mg by mouth 2 (two) times daily as needed.      oxybutynin (DITROPAN-XL) 10 MG 24 hr tablet Take 1 tablet (10 mg total) by mouth once daily. 90 tablet 3    OXYGEN-AIR DELIVERY SYSTEMS [facility]        No current facility-administered medications for this visit.        There are no discontinued medications.    No follow-ups on file.      Dr Addi Saldaña MD    Disclaimer: This note is prepared using voice recognition system and as such is likely to have errors and is not proof read.

## 2019-10-07 ENCOUNTER — TELEPHONE (OUTPATIENT)
Dept: FAMILY MEDICINE | Facility: CLINIC | Age: 79
End: 2019-10-07

## 2019-10-07 NOTE — TELEPHONE ENCOUNTER
Notified Marialuisa that patient has Ativan. Spoke with Kenya and she stated that she did not need us to send a prescription over to the pharmacy for the Ativan that he stills has some from the last refill.  Verbalized understanding.

## 2019-10-22 DIAGNOSIS — I50.9 CONGESTIVE HEART FAILURE: ICD-10-CM

## 2019-10-22 RX ORDER — FUROSEMIDE 40 MG/1
TABLET ORAL
Qty: 30 TABLET | Refills: 0 | Status: SHIPPED | OUTPATIENT
Start: 2019-10-22 | End: 2019-12-19

## 2019-12-19 DIAGNOSIS — I50.9 CONGESTIVE HEART FAILURE: ICD-10-CM

## 2019-12-19 RX ORDER — FUROSEMIDE 40 MG/1
TABLET ORAL
Qty: 30 TABLET | Refills: 0 | Status: SHIPPED | OUTPATIENT
Start: 2019-12-19 | End: 2019-12-19

## 2019-12-19 RX ORDER — FUROSEMIDE 40 MG/1
TABLET ORAL
Qty: 90 TABLET | Refills: 2 | Status: ON HOLD | OUTPATIENT
Start: 2019-12-19 | End: 2020-06-03 | Stop reason: HOSPADM

## 2020-01-06 ENCOUNTER — TELEPHONE (OUTPATIENT)
Dept: FAMILY MEDICINE | Facility: CLINIC | Age: 80
End: 2020-01-06

## 2020-01-06 NOTE — TELEPHONE ENCOUNTER
Spoke with daughter , regarding legs. Advised daughter  To come in so MD can look at legs. Pt stated she would give us a call back

## 2020-01-06 NOTE — TELEPHONE ENCOUNTER
----- Message from Adri Starks sent at 1/6/2020  2:56 PM CST -----  Contact: Pt daughter   Pt daughter is calling .Type:  Needs Medical Advice    Who Called:  Pt daughter   Symptoms (please be specific):Pt daughter would like to know how can she clean pt leg and with what because its oozing      How long has patient had these symptoms:     Pharmacy name and phone #:    Would the patient rather a call back or a response via [facility]?  Call back   Best Call Back Number: [phone]         .Thank You  Adri Starks

## 2020-01-11 ENCOUNTER — HOSPITAL ENCOUNTER (OUTPATIENT)
Facility: HOSPITAL | Age: 80
Discharge: HOME HEALTH | End: 2020-01-12
Attending: EMERGENCY MEDICINE | Admitting: INTERNAL MEDICINE
Payer: MEDICARE

## 2020-01-11 DIAGNOSIS — R79.89 ELEVATED TROPONIN: ICD-10-CM

## 2020-01-11 DIAGNOSIS — I50.9 CHF (CONGESTIVE HEART FAILURE): ICD-10-CM

## 2020-01-11 DIAGNOSIS — M79.89 LEG SWELLING: ICD-10-CM

## 2020-01-11 DIAGNOSIS — R06.02 SOB (SHORTNESS OF BREATH): ICD-10-CM

## 2020-01-11 DIAGNOSIS — L03.115 CELLULITIS OF RIGHT LOWER EXTREMITY: ICD-10-CM

## 2020-01-11 DIAGNOSIS — I48.91 ATRIAL FIBRILLATION WITH RVR: Primary | ICD-10-CM

## 2020-01-11 DIAGNOSIS — I51.89 LEFT VENTRICULAR DIASTOLIC DYSFUNCTION WITH PRESERVED SYSTOLIC FUNCTION: Chronic | ICD-10-CM

## 2020-01-11 PROBLEM — I87.2 VENOUS STASIS DERMATITIS OF BOTH LOWER EXTREMITIES: Status: ACTIVE | Noted: 2020-01-11

## 2020-01-11 LAB
ALBUMIN SERPL BCP-MCNC: 3.2 G/DL (ref 3.5–5.2)
ALP SERPL-CCNC: 67 U/L (ref 55–135)
ALT SERPL W/O P-5'-P-CCNC: 12 U/L (ref 10–44)
ANION GAP SERPL CALC-SCNC: 10 MMOL/L (ref 8–16)
APTT BLDCRRT: 23.9 SEC (ref 21–32)
AST SERPL-CCNC: 18 U/L (ref 10–40)
BASOPHILS # BLD AUTO: 0.01 K/UL (ref 0–0.2)
BASOPHILS NFR BLD: 0.2 % (ref 0–1.9)
BILIRUB SERPL-MCNC: 0.6 MG/DL (ref 0.1–1)
BNP SERPL-MCNC: 81 PG/ML (ref 0–99)
BUN SERPL-MCNC: 12 MG/DL (ref 8–23)
CALCIUM SERPL-MCNC: 8.7 MG/DL (ref 8.7–10.5)
CHLORIDE SERPL-SCNC: 102 MMOL/L (ref 95–110)
CO2 SERPL-SCNC: 24 MMOL/L (ref 23–29)
CREAT SERPL-MCNC: 0.8 MG/DL (ref 0.5–1.4)
DIFFERENTIAL METHOD: ABNORMAL
EOSINOPHIL # BLD AUTO: 0.1 K/UL (ref 0–0.5)
EOSINOPHIL NFR BLD: 2.1 % (ref 0–8)
ERYTHROCYTE [DISTWIDTH] IN BLOOD BY AUTOMATED COUNT: 14.5 % (ref 11.5–14.5)
EST. GFR  (AFRICAN AMERICAN): >60 ML/MIN/1.73 M^2
EST. GFR  (NON AFRICAN AMERICAN): >60 ML/MIN/1.73 M^2
GLUCOSE SERPL-MCNC: 127 MG/DL (ref 70–110)
HCT VFR BLD AUTO: 37.2 % (ref 40–54)
HGB BLD-MCNC: 12.1 G/DL (ref 14–18)
IMM GRANULOCYTES # BLD AUTO: 0.02 K/UL (ref 0–0.04)
IMM GRANULOCYTES NFR BLD AUTO: 0.5 % (ref 0–0.5)
INR PPP: 1 (ref 0.8–1.2)
LYMPHOCYTES # BLD AUTO: 1.3 K/UL (ref 1–4.8)
LYMPHOCYTES NFR BLD: 29.7 % (ref 18–48)
MAGNESIUM SERPL-MCNC: 1.8 MG/DL (ref 1.6–2.6)
MCH RBC QN AUTO: 31.1 PG (ref 27–31)
MCHC RBC AUTO-ENTMCNC: 32.5 G/DL (ref 32–36)
MCV RBC AUTO: 96 FL (ref 82–98)
MONOCYTES # BLD AUTO: 0.4 K/UL (ref 0.3–1)
MONOCYTES NFR BLD: 9.6 % (ref 4–15)
NEUTROPHILS # BLD AUTO: 2.5 K/UL (ref 1.8–7.7)
NEUTROPHILS NFR BLD: 57.9 % (ref 38–73)
NRBC BLD-RTO: 0 /100 WBC
PLATELET # BLD AUTO: 216 K/UL (ref 150–350)
PMV BLD AUTO: 10.4 FL (ref 9.2–12.9)
POTASSIUM SERPL-SCNC: 3.8 MMOL/L (ref 3.5–5.1)
PROT SERPL-MCNC: 9.2 G/DL (ref 6–8.4)
PROTHROMBIN TIME: 10.6 SEC (ref 9–12.5)
RBC # BLD AUTO: 3.89 M/UL (ref 4.6–6.2)
SODIUM SERPL-SCNC: 136 MMOL/L (ref 136–145)
TROPONIN I SERPL DL<=0.01 NG/ML-MCNC: 0.14 NG/ML (ref 0–0.03)
TROPONIN I SERPL DL<=0.01 NG/ML-MCNC: 0.16 NG/ML (ref 0–0.03)
WBC # BLD AUTO: 4.27 K/UL (ref 3.9–12.7)

## 2020-01-11 PROCEDURE — 84484 ASSAY OF TROPONIN QUANT: CPT | Mod: HCNC

## 2020-01-11 PROCEDURE — 96365 THER/PROPH/DIAG IV INF INIT: CPT

## 2020-01-11 PROCEDURE — 25000003 PHARM REV CODE 250: Mod: HCNC | Performed by: INTERNAL MEDICINE

## 2020-01-11 PROCEDURE — 85610 PROTHROMBIN TIME: CPT | Mod: HCNC

## 2020-01-11 PROCEDURE — 63600175 PHARM REV CODE 636 W HCPCS: Mod: HCNC | Performed by: INTERNAL MEDICINE

## 2020-01-11 PROCEDURE — 63600175 PHARM REV CODE 636 W HCPCS: Mod: HCNC | Performed by: EMERGENCY MEDICINE

## 2020-01-11 PROCEDURE — 93005 ELECTROCARDIOGRAM TRACING: CPT | Mod: HCNC

## 2020-01-11 PROCEDURE — G0378 HOSPITAL OBSERVATION PER HR: HCPCS | Mod: HCNC

## 2020-01-11 PROCEDURE — 36415 COLL VENOUS BLD VENIPUNCTURE: CPT | Mod: HCNC

## 2020-01-11 PROCEDURE — 83735 ASSAY OF MAGNESIUM: CPT | Mod: HCNC

## 2020-01-11 PROCEDURE — S0077 INJECTION, CLINDAMYCIN PHOSP: HCPCS | Mod: HCNC | Performed by: INTERNAL MEDICINE

## 2020-01-11 PROCEDURE — 25000003 PHARM REV CODE 250: Mod: HCNC | Performed by: NURSE PRACTITIONER

## 2020-01-11 PROCEDURE — 85025 COMPLETE CBC W/AUTO DIFF WBC: CPT | Mod: HCNC

## 2020-01-11 PROCEDURE — 80053 COMPREHEN METABOLIC PANEL: CPT | Mod: HCNC

## 2020-01-11 PROCEDURE — 93010 EKG 12-LEAD: ICD-10-PCS | Mod: HCNC,,, | Performed by: INTERNAL MEDICINE

## 2020-01-11 PROCEDURE — 83880 ASSAY OF NATRIURETIC PEPTIDE: CPT | Mod: HCNC

## 2020-01-11 PROCEDURE — 84484 ASSAY OF TROPONIN QUANT: CPT | Mod: 91,HCNC

## 2020-01-11 PROCEDURE — 99291 CRITICAL CARE FIRST HOUR: CPT | Mod: 25,HCNC

## 2020-01-11 PROCEDURE — 25000003 PHARM REV CODE 250: Mod: HCNC | Performed by: EMERGENCY MEDICINE

## 2020-01-11 PROCEDURE — 93010 ELECTROCARDIOGRAM REPORT: CPT | Mod: HCNC,,, | Performed by: INTERNAL MEDICINE

## 2020-01-11 PROCEDURE — 96375 TX/PRO/DX INJ NEW DRUG ADDON: CPT | Mod: HCNC

## 2020-01-11 PROCEDURE — 85730 THROMBOPLASTIN TIME PARTIAL: CPT | Mod: HCNC

## 2020-01-11 PROCEDURE — 96372 THER/PROPH/DIAG INJ SC/IM: CPT | Mod: 59

## 2020-01-11 RX ORDER — DILTIAZEM HYDROCHLORIDE 120 MG/1
120 CAPSULE, COATED, EXTENDED RELEASE ORAL DAILY
Status: DISCONTINUED | OUTPATIENT
Start: 2020-01-12 | End: 2020-01-12 | Stop reason: HOSPADM

## 2020-01-11 RX ORDER — HYDROCODONE BITARTRATE AND ACETAMINOPHEN 10; 325 MG/1; MG/1
1 TABLET ORAL EVERY 4 HOURS PRN
Status: DISCONTINUED | OUTPATIENT
Start: 2020-01-11 | End: 2020-01-12 | Stop reason: HOSPADM

## 2020-01-11 RX ORDER — FUROSEMIDE 10 MG/ML
60 INJECTION INTRAMUSCULAR; INTRAVENOUS
Status: COMPLETED | OUTPATIENT
Start: 2020-01-11 | End: 2020-01-11

## 2020-01-11 RX ORDER — FUROSEMIDE 40 MG/1
40 TABLET ORAL 2 TIMES DAILY
Status: DISCONTINUED | OUTPATIENT
Start: 2020-01-11 | End: 2020-01-12

## 2020-01-11 RX ORDER — ASPIRIN 81 MG/1
81 TABLET ORAL DAILY
Status: DISCONTINUED | OUTPATIENT
Start: 2020-01-12 | End: 2020-01-12 | Stop reason: HOSPADM

## 2020-01-11 RX ORDER — NAPROXEN SODIUM 220 MG/1
162 TABLET, FILM COATED ORAL
Status: COMPLETED | OUTPATIENT
Start: 2020-01-11 | End: 2020-01-11

## 2020-01-11 RX ORDER — CLINDAMYCIN PHOSPHATE 600 MG/50ML
600 INJECTION, SOLUTION INTRAVENOUS ONCE
Status: COMPLETED | OUTPATIENT
Start: 2020-01-11 | End: 2020-01-11

## 2020-01-11 RX ORDER — IBUPROFEN 400 MG/1
400 TABLET ORAL EVERY 6 HOURS PRN
Status: DISCONTINUED | OUTPATIENT
Start: 2020-01-11 | End: 2020-01-12 | Stop reason: HOSPADM

## 2020-01-11 RX ORDER — ACETAMINOPHEN 325 MG/1
325 TABLET ORAL EVERY 8 HOURS PRN
Status: DISCONTINUED | OUTPATIENT
Start: 2020-01-11 | End: 2020-01-12 | Stop reason: HOSPADM

## 2020-01-11 RX ORDER — SODIUM CHLORIDE 9 MG/ML
INJECTION, SOLUTION INTRAVENOUS CONTINUOUS
Status: DISCONTINUED | OUTPATIENT
Start: 2020-01-11 | End: 2020-01-11

## 2020-01-11 RX ORDER — OXYBUTYNIN CHLORIDE 5 MG/1
10 TABLET, EXTENDED RELEASE ORAL DAILY
Status: DISCONTINUED | OUTPATIENT
Start: 2020-01-12 | End: 2020-01-12 | Stop reason: HOSPADM

## 2020-01-11 RX ORDER — METOPROLOL TARTRATE 1 MG/ML
5 INJECTION, SOLUTION INTRAVENOUS EVERY 5 MIN PRN
Status: DISCONTINUED | OUTPATIENT
Start: 2020-01-11 | End: 2020-01-12 | Stop reason: HOSPADM

## 2020-01-11 RX ORDER — LORAZEPAM 1 MG/1
1 TABLET ORAL 2 TIMES DAILY PRN
Status: DISCONTINUED | OUTPATIENT
Start: 2020-01-11 | End: 2020-01-12 | Stop reason: HOSPADM

## 2020-01-11 RX ORDER — HEPARIN SODIUM 5000 [USP'U]/ML
5000 INJECTION, SOLUTION INTRAVENOUS; SUBCUTANEOUS EVERY 8 HOURS
Status: DISCONTINUED | OUTPATIENT
Start: 2020-01-11 | End: 2020-01-12 | Stop reason: HOSPADM

## 2020-01-11 RX ORDER — SODIUM CHLORIDE 0.9 % (FLUSH) 0.9 %
10 SYRINGE (ML) INJECTION
Status: DISCONTINUED | OUTPATIENT
Start: 2020-01-11 | End: 2020-01-12 | Stop reason: HOSPADM

## 2020-01-11 RX ADMIN — HEPARIN SODIUM 5000 UNITS: 5000 INJECTION, SOLUTION INTRAVENOUS; SUBCUTANEOUS at 09:01

## 2020-01-11 RX ADMIN — FUROSEMIDE 60 MG: 10 INJECTION, SOLUTION INTRAMUSCULAR; INTRAVENOUS at 03:01

## 2020-01-11 RX ADMIN — LORAZEPAM 1 MG: 1 TABLET ORAL at 06:01

## 2020-01-11 RX ADMIN — ASPIRIN 81 MG 162 MG: 81 TABLET ORAL at 06:01

## 2020-01-11 RX ADMIN — FUROSEMIDE 40 MG: 40 TABLET ORAL at 09:01

## 2020-01-11 RX ADMIN — METOPROLOL TARTRATE 5 MG: 5 INJECTION INTRAVENOUS at 03:01

## 2020-01-11 RX ADMIN — CLINDAMYCIN PHOSPHATE 600 MG: 600 INJECTION, SOLUTION INTRAVENOUS at 06:01

## 2020-01-11 NOTE — ED PROVIDER NOTES
SCRIBE #1 NOTE: I, Amairani Lerner, am scribing for, and in the presence of, Kenya Simpson DO. I have scribed the entire note.      History      Chief Complaint   Patient presents with    Leg Swelling     redness, and swelling       Review of patient's allergies indicates:   Allergen Reactions    No known allergies         HPI   HPI    1/11/2020, 2:56 PM   History obtained from the patient and daughter      History of Present Illness: Jatin Kohler is a 79 y.o. male patient with a PMHx of A fib, Anemia, CHF, Bilateral carotid artery disease, Hyperlipidemia, HTN, Prostate hypertrophy, stroke, who presents to the Emergency Department for bilateral leg swelling which onset gradually PTA.  Patient is noncompliant with his medications.  Patient has known history of chronic diastolic heart failure.  They note that there has been increasing redness to the right lower extremity.  Nothing makes it better, nothing makes it worse.  Symptoms are constant and moderate in severity. No mitigating or exacerbating factors reported. Associated sxs include bilateral leg pain and dizziness. Patient denies any fever, chills, n/v/d, CP, SOB, HA, abd pain, weakness, numbness, and all other sxs at this time. No prior Tx reported. No further complaints or concerns at this time.       Arrival mode: Personal vehicle     PCP: Addi Saldaña MD       Past Medical History:  Past Medical History:   Diagnosis Date    *Atrial fibrillation     Anemia due to blood loss, chronic 10/15/2013    Anxiety     Bilateral carotid artery disease 6/2/2016    CHF (congestive heart failure)     Depression     Hyperlipidemia     Hypertension     Prostate hypertrophy     Sleep apnea 9/12/2013    Stroke     Venous insufficiency 9/12/2013       Past Surgical History:  Past Surgical History:   Procedure Laterality Date    APPENDECTOMY      CATARACT EXTRACTION, BILATERAL  08/2013    CHOLECYSTECTOMY      COLON SURGERY      diverticulitis    EYE  SURGERY  2013    cataract    HEMORRHOID SURGERY      JOINT REPLACEMENT      left hip    WOUND DEBRIDEMENT           Family History:  Family History   Problem Relation Age of Onset    Heart disease Mother     Diabetes Mother     Diabetes Sister     Hypertension Brother     Diabetes Sister     Diabetes Sister        Social History:  Social History     Tobacco Use    Smoking status: Former Smoker     Packs/day: 0.50     Years: 20.00     Pack years: 10.00     Types: Cigarettes     Last attempt to quit: 1998     Years since quittin.0    Smokeless tobacco: Never Used   Substance and Sexual Activity    Alcohol use: Yes     Comment: social use of alcohol    Drug use: No    Sexual activity: Not Currently     Birth control/protection: None       ROS   Review of Systems   Constitutional: Negative for chills and fever.   HENT: Negative for sore throat.    Respiratory: Negative for shortness of breath.    Cardiovascular: Negative for chest pain.   Gastrointestinal: Negative for abdominal pain, diarrhea, nausea and vomiting.   Genitourinary: Negative for dysuria.   Musculoskeletal: Positive for myalgias (bilateral leg pain). Negative for back pain.        (+) bilateral leg swelling   Skin: Negative for rash.   Neurological: Positive for dizziness. Negative for weakness, numbness and headaches.   Hematological: Does not bruise/bleed easily.   All other systems reviewed and are negative.    Physical Exam      Initial Vitals [20 1354]   BP Pulse Resp Temp SpO2   (!) 149/90 (!) 119 20 98.3 °F (36.8 °C) 97 %      MAP       --          Physical Exam  Nursing Notes and Vital Signs Reviewed.  Constitutional: Patient is in no acute distress. Well-developed and well-nourished. Obese.  Head: Atraumatic. Normocephalic.  Eyes: PERRL. EOM intact. Conjunctivae are not pale. No scleral icterus.  ENT: Mucous membranes are moist. Oropharynx is clear and symmetric.    Neck: Supple. Full ROM. No  lymphadenopathy.  Cardiovascular: Tachycardic.  Irregular irregular rate No murmurs, rubs, or gallops. Distal pulses are 2+ and symmetric.  Pulmonary/Chest: No respiratory distress. Clear to auscultation bilaterally. No wheezing or rales.  Abdominal: Soft and non-distended.  There is no tenderness.  No rebound, guarding, or rigidity. Good bowel sounds.  Genitourinary: No CVA tenderness  Musculoskeletal: Moves all extremities. No obvious deformities. No edema. No calf tenderness. 2+ 3+ edema. Warmth and erythema to R lower foot, toes, and ankle.  Skin: Warm and dry.  Neurological:  Alert, awake, and appropriate.  Normal speech.  No acute focal neurological deficits are appreciated.  Psychiatric: Normal affect. Good eye contact. Appropriate in content.    There is +3 pedal edema that is present.  The right lower extremity demonstrates warmth and erythema.        ED Course    Critical Care  Date/Time: 1/11/2020 5:05 PM  Performed by: Kenya Simpson DO  Authorized by: Kenya Simpson DO   Direct patient critical care time: 10 minutes  Additional history critical care time: 11 minutes  Ordering / reviewing critical care time: 12 minutes  Documentation critical care time: 10 minutes  Total critical care time (exclusive of procedural time) : 43 minutes  Critical care time was exclusive of separately billable procedures and treating other patients and teaching time.  Critical care was necessary to treat or prevent imminent or life-threatening deterioration of the following conditions: A fib with RVR.  Critical care was time spent personally by me on the following activities: blood draw for specimens, development of treatment plan with patient or surrogate, interpretation of cardiac output measurements, evaluation of patient's response to treatment, examination of patient, obtaining history from patient or surrogate, ordering and performing treatments and interventions, ordering and review of laboratory studies,  "ordering and review of radiographic studies, pulse oximetry, re-evaluation of patient's condition and review of old charts.        ED Vital Signs:  Vitals:    01/11/20 1354 01/11/20 1413 01/11/20 1435 01/11/20 1518   BP: (!) 149/90   (!) 145/92   Pulse: (!) 119  105 110   Resp: 20   20   Temp: 98.3 °F (36.8 °C)      TempSrc: Oral      SpO2: 97%   97%   Weight:  98.8 kg (217 lb 13 oz)     Height: 5' 8.5" (1.74 m)       01/11/20 1602 01/11/20 1606 01/11/20 1618 01/11/20 1633   BP: (!) 125/98  (!) 150/116 126/85   Pulse: 100 96 96 101   Resp: (!) 35 17 (!) 22 (!) 22   Temp:       TempSrc:       SpO2: 96% 96% 97% 96%   Weight:       Height:        01/11/20 1818 01/11/20 1847 01/1940   BP: (!) 150/95 (!) 139/91 131/79   Pulse: 106 95 88   Resp: 18 19 18   Temp:   98 °F (36.7 °C)   TempSrc:   Oral   SpO2: 96%  98%   Weight:   98 kg (216 lb 0.8 oz)   Height:          Abnormal Lab Results:  Labs Reviewed   COMPREHENSIVE METABOLIC PANEL - Abnormal; Notable for the following components:       Result Value    Glucose 127 (*)     Total Protein 9.2 (*)     Albumin 3.2 (*)     All other components within normal limits   CBC W/ AUTO DIFFERENTIAL - Abnormal; Notable for the following components:    RBC 3.89 (*)     Hemoglobin 12.1 (*)     Hematocrit 37.2 (*)     Mean Corpuscular Hemoglobin 31.1 (*)     All other components within normal limits   TROPONIN I - Abnormal; Notable for the following components:    Troponin I 0.158 (*)     All other components within normal limits   APTT   PROTIME-INR   B-TYPE NATRIURETIC PEPTIDE   MAGNESIUM   TROPONIN I        All Lab Results:  Results for orders placed or performed during the hospital encounter of 01/11/20   APTT   Result Value Ref Range    aPTT 23.9 21.0 - 32.0 sec   Protime-INR   Result Value Ref Range    Prothrombin Time 10.6 9.0 - 12.5 sec    INR 1.0 0.8 - 1.2   Comprehensive metabolic panel   Result Value Ref Range    Sodium 136 136 - 145 mmol/L    Potassium 3.8 3.5 - 5.1 " mmol/L    Chloride 102 95 - 110 mmol/L    CO2 24 23 - 29 mmol/L    Glucose 127 (H) 70 - 110 mg/dL    BUN, Bld 12 8 - 23 mg/dL    Creatinine 0.8 0.5 - 1.4 mg/dL    Calcium 8.7 8.7 - 10.5 mg/dL    Total Protein 9.2 (H) 6.0 - 8.4 g/dL    Albumin 3.2 (L) 3.5 - 5.2 g/dL    Total Bilirubin 0.6 0.1 - 1.0 mg/dL    Alkaline Phosphatase 67 55 - 135 U/L    AST 18 10 - 40 U/L    ALT 12 10 - 44 U/L    Anion Gap 10 8 - 16 mmol/L    eGFR if African American >60 >60 mL/min/1.73 m^2    eGFR if non African American >60 >60 mL/min/1.73 m^2   CBC auto differential   Result Value Ref Range    WBC 4.27 3.90 - 12.70 K/uL    RBC 3.89 (L) 4.60 - 6.20 M/uL    Hemoglobin 12.1 (L) 14.0 - 18.0 g/dL    Hematocrit 37.2 (L) 40.0 - 54.0 %    Mean Corpuscular Volume 96 82 - 98 fL    Mean Corpuscular Hemoglobin 31.1 (H) 27.0 - 31.0 pg    Mean Corpuscular Hemoglobin Conc 32.5 32.0 - 36.0 g/dL    RDW 14.5 11.5 - 14.5 %    Platelets 216 150 - 350 K/uL    MPV 10.4 9.2 - 12.9 fL    Immature Granulocytes 0.5 0.0 - 0.5 %    Gran # (ANC) 2.5 1.8 - 7.7 K/uL    Immature Grans (Abs) 0.02 0.00 - 0.04 K/uL    Lymph # 1.3 1.0 - 4.8 K/uL    Mono # 0.4 0.3 - 1.0 K/uL    Eos # 0.1 0.0 - 0.5 K/uL    Baso # 0.01 0.00 - 0.20 K/uL    nRBC 0 0 /100 WBC    Gran% 57.9 38.0 - 73.0 %    Lymph% 29.7 18.0 - 48.0 %    Mono% 9.6 4.0 - 15.0 %    Eosinophil% 2.1 0.0 - 8.0 %    Basophil% 0.2 0.0 - 1.9 %    Differential Method Automated    Brain natriuretic peptide   Result Value Ref Range    BNP 81 0 - 99 pg/mL   Magnesium   Result Value Ref Range    Magnesium 1.8 1.6 - 2.6 mg/dL   Troponin I   Result Value Ref Range    Troponin I 0.158 (H) 0.000 - 0.026 ng/mL         Imaging Results:  Imaging Results          US Lower Extremity Veins Bilateral (Final result)  Result time 01/11/20 16:34:27    Final result by Vladislav Fields MD (01/11/20 16:34:27)                 Impression:      Negative for lower extremity DVT, bilaterally.      Electronically signed by: Vladislav  Donnie  Date:    01/11/2020  Time:    16:34             Narrative:    EXAMINATION:  US LOWER EXTREMITY VEINS BILATERAL    CLINICAL HISTORY:  Bilateral lower extremity swelling.    COMPARISON:  None.    FINDINGS:  The deep veins demonstrate a normal appearance of the common femoral vein, deep profunda, superficial femoral and popliteal vein.  There is no evidence of deep venous thrombosis.  Additionally, tibial and peroneal veins are patent.                               X-Ray Chest AP Portable (Final result)  Result time 01/11/20 15:08:58    Final result by Adithya Walker MD (01/11/20 15:08:58)                 Impression:      See above      Electronically signed by: Adithya Walker MD  Date:    01/11/2020  Time:    15:08             Narrative:    EXAMINATION:  XR CHEST AP PORTABLE    CLINICAL HISTORY:  Other specified soft tissue disorders    FINDINGS:  Comparison study 03/26/2016.  Mild cardiomegaly with prominent epicardial fat pad, stable.  Mild aortic tortuosity with atherosclerosis.  No vascular congestion.  Right pleural effusion with peripheral basilar opacity.  Bibasilar haziness, likely layering pleural fluid including the on the left.  Otherwise, lungs are clear.                               The EKG was ordered, reviewed, and independently interpreted by the ED provider.  Interpretation time: 14:38  Rate: 115 BPM  Rhythm: Atrial fibrillation with rapid ventricular response with premature ventricular or aberrantly conducted complexes  Interpretation: No acute ST changes. No STEMI.             The Emergency Provider reviewed the vital signs and test results, which are outlined above.    ED Discussion     4:00 PM: Daughters phone number is [phone]    5:03 PM: Discussed case with VANE Hernandez ([facility]). Dr. Hernandez agrees with current care and management of pt and accepts admission.   Admitting Service: Hospital Medicine  Admitting Physician: Dr. Hernandez  Admit to: Obs  Tele    5:03 PM: Re-evaluated pt. I have discussed test results, shared treatment plan, and the need for admission with patient and family at bedside. Pt and family express understanding at this time and agree with all information. All questions answered. Pt and family have no further questions or concerns at this time. Pt is ready for admit.           ED Medication(s):  Medications   metoprolol injection 5 mg (5 mg Intravenous Given 1/11/20 1540)   aspirin EC tablet 81 mg (has no administration in time range)   diltiaZEM 24 hr capsule 120 mg (has no administration in time range)   HYDROcodone-acetaminophen  mg per tablet 1 tablet (has no administration in time range)   oxybutynin 24 hr tablet 10 mg (has no administration in time range)   sodium chloride 0.9% flush 10 mL (has no administration in time range)   ibuprofen tablet 400 mg (has no administration in time range)   heparin (porcine) injection 5,000 Units (has no administration in time range)   acetaminophen tablet 325 mg (has no administration in time range)   furosemide tablet 40 mg (has no administration in time range)   LORazepam tablet 1 mg (1 mg Oral Given 1/11/20 1856)   furosemide injection 60 mg (60 mg Intravenous Given 1/11/20 1540)   aspirin chewable tablet 162 mg (162 mg Oral Given 1/11/20 1819)   clindamycin 600 MG/50 ML D5W 600 mg/50 mL IVPB 600 mg (0 mg Intravenous Stopped 1/11/20 1851)             Medical Decision Making    Medical Decision Making:   Initial Assessment:   Patient is a 79-year-old male with history of atrial fibrillation and chronic diastolic heart failure presenting to emergency department worsening swelling of the lower extremities along with warmth and erythema.  Differential Diagnosis:   Decompensated diastolic heart failure, cellulitis, DVT,  Independently Interpreted Test(s):   I have ordered and independently interpreted EKG Reading(s) - see prior notes  Clinical Tests:   Lab Tests: Ordered and  Reviewed  Radiological Study: Ordered and Reviewed  Medical Tests: Ordered and Reviewed  ED Management:  Patient given beta-blocker to help control heart rate.  Ultrasound was performed to rule out DVT.  Chest x-ray mild cardiomegaly with pleural effusion.  Patient given Lasix for diuresis.  Clindamycin was administered for potential cellulitis.  Case was discussed with hospital medicine who recommended observation status.           Scribe Attestation:   Scribe #1: I performed the above scribed service and the documentation accurately describes the services I performed. I attest to the accuracy of the note.    Attending:   Physician Attestation Statement for Scribe #1: I, Kenya Simpson DO, personally performed the services described in this documentation, as scribed by Amairani Lerner, in my presence, and it is both accurate and complete.          Clinical Impression       ICD-10-CM ICD-9-CM   1. Atrial fibrillation with RVR I48.91 427.31   2. Leg swelling M79.89 729.81   3. CHF (congestive heart failure) I50.9 428.0   4. Cellulitis of right lower extremity L03.115 682.6   5. Elevated troponin R79.89 790.6       Disposition:   Disposition: Placed in Observation  Condition: Fair         Kenya Simpson DO  01/11/20 2008

## 2020-01-12 VITALS
SYSTOLIC BLOOD PRESSURE: 127 MMHG | HEART RATE: 102 BPM | WEIGHT: 216.06 LBS | OXYGEN SATURATION: 96 % | TEMPERATURE: 99 F | HEIGHT: 69 IN | RESPIRATION RATE: 18 BRPM | BODY MASS INDEX: 32 KG/M2 | DIASTOLIC BLOOD PRESSURE: 67 MMHG

## 2020-01-12 PROBLEM — M79.89 LEG SWELLING: Status: RESOLVED | Noted: 2020-01-11 | Resolved: 2020-01-12

## 2020-01-12 LAB
ANION GAP SERPL CALC-SCNC: 10 MMOL/L (ref 8–16)
BASOPHILS # BLD AUTO: 0.01 K/UL (ref 0–0.2)
BASOPHILS NFR BLD: 0.3 % (ref 0–1.9)
BUN SERPL-MCNC: 15 MG/DL (ref 8–23)
CALCIUM SERPL-MCNC: 8.4 MG/DL (ref 8.7–10.5)
CHLORIDE SERPL-SCNC: 102 MMOL/L (ref 95–110)
CO2 SERPL-SCNC: 24 MMOL/L (ref 23–29)
CREAT SERPL-MCNC: 0.9 MG/DL (ref 0.5–1.4)
DIASTOLIC DYSFUNCTION: YES
DIFFERENTIAL METHOD: ABNORMAL
EOSINOPHIL # BLD AUTO: 0.1 K/UL (ref 0–0.5)
EOSINOPHIL NFR BLD: 2.9 % (ref 0–8)
ERYTHROCYTE [DISTWIDTH] IN BLOOD BY AUTOMATED COUNT: 14.1 % (ref 11.5–14.5)
EST. GFR  (AFRICAN AMERICAN): >60 ML/MIN/1.73 M^2
EST. GFR  (NON AFRICAN AMERICAN): >60 ML/MIN/1.73 M^2
ESTIMATED PA SYSTOLIC PRESSURE: 26.42
GLUCOSE SERPL-MCNC: 99 MG/DL (ref 70–110)
HCT VFR BLD AUTO: 35.7 % (ref 40–54)
HGB BLD-MCNC: 11.9 G/DL (ref 14–18)
IMM GRANULOCYTES # BLD AUTO: 0.02 K/UL (ref 0–0.04)
IMM GRANULOCYTES NFR BLD AUTO: 0.6 % (ref 0–0.5)
INR PPP: 1 (ref 0.8–1.2)
LYMPHOCYTES # BLD AUTO: 0.9 K/UL (ref 1–4.8)
LYMPHOCYTES NFR BLD: 27.4 % (ref 18–48)
MCH RBC QN AUTO: 31.5 PG (ref 27–31)
MCHC RBC AUTO-ENTMCNC: 33.3 G/DL (ref 32–36)
MCV RBC AUTO: 94 FL (ref 82–98)
MONOCYTES # BLD AUTO: 0.3 K/UL (ref 0.3–1)
MONOCYTES NFR BLD: 9.1 % (ref 4–15)
NEUTROPHILS # BLD AUTO: 2 K/UL (ref 1.8–7.7)
NEUTROPHILS NFR BLD: 59.7 % (ref 38–73)
NRBC BLD-RTO: 0 /100 WBC
PLATELET # BLD AUTO: 203 K/UL (ref 150–350)
PMV BLD AUTO: 10.5 FL (ref 9.2–12.9)
POCT GLUCOSE: 103 MG/DL (ref 70–110)
POCT GLUCOSE: 155 MG/DL (ref 70–110)
POTASSIUM SERPL-SCNC: 3.5 MMOL/L (ref 3.5–5.1)
PROTHROMBIN TIME: 10.9 SEC (ref 9–12.5)
RBC # BLD AUTO: 3.78 M/UL (ref 4.6–6.2)
RETIRED EF AND QEF - SEE NOTES: 60 (ref 55–65)
SODIUM SERPL-SCNC: 136 MMOL/L (ref 136–145)
TROPONIN I SERPL DL<=0.01 NG/ML-MCNC: 0.14 NG/ML (ref 0–0.03)
WBC # BLD AUTO: 3.4 K/UL (ref 3.9–12.7)

## 2020-01-12 PROCEDURE — 85610 PROTHROMBIN TIME: CPT | Mod: HCNC

## 2020-01-12 PROCEDURE — 25000003 PHARM REV CODE 250: Mod: HCNC | Performed by: INTERNAL MEDICINE

## 2020-01-12 PROCEDURE — 84484 ASSAY OF TROPONIN QUANT: CPT | Mod: HCNC

## 2020-01-12 PROCEDURE — 99204 PR OFFICE/OUTPT VISIT, NEW, LEVL IV, 45-59 MIN: ICD-10-PCS | Mod: 25,HCNC,, | Performed by: INTERNAL MEDICINE

## 2020-01-12 PROCEDURE — 36415 COLL VENOUS BLD VENIPUNCTURE: CPT | Mod: HCNC

## 2020-01-12 PROCEDURE — 63600175 PHARM REV CODE 636 W HCPCS: Mod: HCNC | Performed by: INTERNAL MEDICINE

## 2020-01-12 PROCEDURE — 93306 TTE W/DOPPLER COMPLETE: CPT | Mod: 26,HCNC,, | Performed by: INTERNAL MEDICINE

## 2020-01-12 PROCEDURE — 80048 BASIC METABOLIC PNL TOTAL CA: CPT | Mod: HCNC

## 2020-01-12 PROCEDURE — 85025 COMPLETE CBC W/AUTO DIFF WBC: CPT | Mod: HCNC

## 2020-01-12 PROCEDURE — G0378 HOSPITAL OBSERVATION PER HR: HCPCS | Mod: HCNC

## 2020-01-12 PROCEDURE — 99204 OFFICE O/P NEW MOD 45 MIN: CPT | Mod: 25,HCNC,, | Performed by: INTERNAL MEDICINE

## 2020-01-12 PROCEDURE — 25000003 PHARM REV CODE 250: Mod: HCNC | Performed by: NURSE PRACTITIONER

## 2020-01-12 PROCEDURE — 93306 2D ECHO WITH COLOR FLOW DOPPLER: ICD-10-PCS | Mod: 26,HCNC,, | Performed by: INTERNAL MEDICINE

## 2020-01-12 PROCEDURE — 93306 TTE W/DOPPLER COMPLETE: CPT | Mod: HCNC

## 2020-01-12 RX ORDER — FUROSEMIDE 10 MG/ML
20 INJECTION INTRAMUSCULAR; INTRAVENOUS 2 TIMES DAILY
Status: DISCONTINUED | OUTPATIENT
Start: 2020-01-12 | End: 2020-01-12 | Stop reason: HOSPADM

## 2020-01-12 RX ADMIN — HEPARIN SODIUM 5000 UNITS: 5000 INJECTION, SOLUTION INTRAVENOUS; SUBCUTANEOUS at 05:01

## 2020-01-12 RX ADMIN — OXYBUTYNIN CHLORIDE 10 MG: 5 TABLET, EXTENDED RELEASE ORAL at 09:01

## 2020-01-12 RX ADMIN — HEPARIN SODIUM 5000 UNITS: 5000 INJECTION, SOLUTION INTRAVENOUS; SUBCUTANEOUS at 01:01

## 2020-01-12 RX ADMIN — ASPIRIN 81 MG: 81 TABLET, COATED ORAL at 09:01

## 2020-01-12 RX ADMIN — FUROSEMIDE 40 MG: 40 TABLET ORAL at 09:01

## 2020-01-12 RX ADMIN — DILTIAZEM HYDROCHLORIDE 120 MG: 120 CAPSULE, COATED, EXTENDED RELEASE ORAL at 09:01

## 2020-01-12 NOTE — PLAN OF CARE
Preference signed for [facility].  Spoke to nurse, [place].  Advised to send referral via [facility].         01/12/20 8879   Post-Acute Status   Post-Acute Authorization Home Health/Hospice   Home Health/Hospice Status Referrals Sent       [facility] is unable to access [facility] and requested info be hard faxed to [phone].

## 2020-01-12 NOTE — SUBJECTIVE & OBJECTIVE
Past Medical History:   Diagnosis Date    *Atrial fibrillation     Anemia due to blood loss, chronic 10/15/2013    Anxiety     Bilateral carotid artery disease 6/2/2016    CHF (congestive heart failure)     Depression     Hyperlipidemia     Hypertension     Prostate hypertrophy     Sleep apnea 9/12/2013    Stroke     Venous insufficiency 9/12/2013       Past Surgical History:   Procedure Laterality Date    APPENDECTOMY      CATARACT EXTRACTION, BILATERAL  08/2013    CHOLECYSTECTOMY      COLON SURGERY      diverticulitis    EYE SURGERY  08/2013    cataract    HEMORRHOID SURGERY      JOINT REPLACEMENT      left hip    WOUND DEBRIDEMENT         Review of patient's allergies indicates:   Allergen Reactions    No known allergies        No current facility-administered medications on file prior to encounter.      Current Outpatient Medications on File Prior to Encounter   Medication Sig    aspirin (ECOTRIN) 81 MG EC tablet Take 1 tablet (81 mg total) by mouth once daily.    diltiaZEM (CARDIZEM CD) 120 MG Cp24 Take 1 capsule (120 mg total) by mouth once daily.    furosemide (LASIX) 40 MG tablet TAKE 1 TABLET(40 MG) BY MOUTH DAILY AS NEEDED    HYDROcodone-acetaminophen (NORCO)  mg per tablet Take 1 tablet by mouth every 4 (four) hours as needed for Pain.    LORazepam (ATIVAN) 1 MG tablet Take 1 tablet (1 mg total) by mouth 2 (two) times daily as needed for Anxiety.    naproxen sodium (ANAPROX) 220 MG tablet Take 220 mg by mouth 2 (two) times daily as needed.    oxybutynin (DITROPAN-XL) 10 MG 24 hr tablet Take 1 tablet (10 mg total) by mouth once daily.    OXYGEN-AIR DELIVERY SYSTEMS [facility] [place]      Family History     Problem Relation (Age of Onset)    Diabetes Mother, Sister, Sister, Sister    Heart disease Mother    Hypertension Brother        Tobacco Use    Smoking status: Former Smoker     Packs/day: 0.50     Years: 20.00     Pack years: 10.00     Types: Cigarettes     Last attempt to quit:  1998     Years since quittin.0    Smokeless tobacco: Never Used   Substance and Sexual Activity    Alcohol use: Yes     Comment: social use of alcohol    Drug use: No    Sexual activity: Not Currently     Birth control/protection: None     Review of Systems   Constitution: Negative. Negative for weight gain.   HENT: Negative.    Eyes: Negative.    Cardiovascular: Negative.  Negative for chest pain, leg swelling and palpitations.   Respiratory: Negative.  Negative for shortness of breath.    Endocrine: Negative.    Hematologic/Lymphatic: Negative.    Skin: Negative.    Musculoskeletal: Negative for muscle weakness.   Gastrointestinal: Negative.    Genitourinary: Negative.    Neurological: Negative.  Negative for dizziness.   Psychiatric/Behavioral: Negative.    Allergic/Immunologic: Negative.      Objective:     Vital Signs (Most Recent):  Temp: 98.7 °F (37.1 °C) (20 1116)  Pulse: 100 (20 1116)  Resp: 18 (20 1116)  BP: 129/73 (20 1116)  SpO2: 97 % (20 1116) Vital Signs (24h Range):  Temp:  [98 °F (36.7 °C)-98.9 °F (37.2 °C)] 98.7 °F (37.1 °C)  Pulse:  [] 100  Resp:  [16-35] 18  SpO2:  [91 %-98 %] 97 %  BP: (125-150)/() 129/73     Weight: 98 kg (216 lb 0.8 oz)  Body mass index is 32.37 kg/m².    SpO2: 97 %  O2 Device (Oxygen Therapy): room air      Intake/Output Summary (Last 24 hours) at 2020 1245  Last data filed at 2020 0500  Gross per 24 hour   Intake 390 ml   Output 2120 ml   Net -1730 ml       Lines/Drains/Airways     Peripheral Intravenous Line     Name Duration         Peripheral IV - Single Lumen 20 20 G Right Antecubital 1 day                Physical Exam   Constitutional: He is oriented to person, place, and time. He appears well-developed and well-nourished.   HENT:   Head: Normocephalic and atraumatic.   Eyes: Pupils are equal, round, and reactive to light. Conjunctivae are normal.   Neck: Normal range of motion. Neck supple.    Cardiovascular: Normal rate, normal heart sounds and intact distal pulses. An irregularly irregular rhythm present.   Pulmonary/Chest: Effort normal and breath sounds normal.   Abdominal: Soft. Bowel sounds are normal.   Neurological: He is alert and oriented to person, place, and time.   Skin: Skin is warm and dry.   Psychiatric: He has a normal mood and affect.   Nursing note and vitals reviewed.      Significant Labs: All pertinent lab results from the last 24 hours have been reviewed.    Significant Imaging: X-Ray: CXR: X-Ray Chest 1 View (CXR): No results found for this visit on 01/11/20.

## 2020-01-12 NOTE — NURSING
Reviewed discharge instructions and medications. Expressed the need for follow up appointments per physicians recommendations. Patient was given the opportunity for questions and all were answered to satisfaction. Telemetry monitor removed and returned to monitor room. IV removed without incident. Compression dressing applied and patient instructed to leave on no longer than 30 minutes. Patient to call when ready to roll out.

## 2020-01-12 NOTE — SUBJECTIVE & OBJECTIVE
Past Medical History:   Diagnosis Date    *Atrial fibrillation     Anemia due to blood loss, chronic 10/15/2013    Anxiety     Bilateral carotid artery disease 6/2/2016    CHF (congestive heart failure)     Depression     Hyperlipidemia     Hypertension     Prostate hypertrophy     Sleep apnea 9/12/2013    Stroke     Venous insufficiency 9/12/2013       Past Surgical History:   Procedure Laterality Date    APPENDECTOMY      CATARACT EXTRACTION, BILATERAL  08/2013    CHOLECYSTECTOMY      COLON SURGERY      diverticulitis    EYE SURGERY  08/2013    cataract    HEMORRHOID SURGERY      JOINT REPLACEMENT      left hip    WOUND DEBRIDEMENT         Review of patient's allergies indicates:   Allergen Reactions    No known allergies        No current facility-administered medications on file prior to encounter.      Current Outpatient Medications on File Prior to Encounter   Medication Sig    aspirin (ECOTRIN) 81 MG EC tablet Take 1 tablet (81 mg total) by mouth once daily.    diltiaZEM (CARDIZEM CD) 120 MG Cp24 Take 1 capsule (120 mg total) by mouth once daily.    furosemide (LASIX) 40 MG tablet TAKE 1 TABLET(40 MG) BY MOUTH DAILY AS NEEDED    HYDROcodone-acetaminophen (NORCO)  mg per tablet Take 1 tablet by mouth every 4 (four) hours as needed for Pain.    LORazepam (ATIVAN) 1 MG tablet Take 1 tablet (1 mg total) by mouth 2 (two) times daily as needed for Anxiety.    naproxen sodium (ANAPROX) 220 MG tablet Take 220 mg by mouth 2 (two) times daily as needed.    oxybutynin (DITROPAN-XL) 10 MG 24 hr tablet Take 1 tablet (10 mg total) by mouth once daily.    [place] [facility] [place]      [place] History     Problem Relation (Age of Onset)    Diabetes Mother, Sister, Sister, Sister    Heart disease Mother    Hypertension Brother        Tobacco Use    Smoking status: Former Smoker     Packs/day: 0.50     Years: 20.00     Pack years: 10.00     Types: Cigarettes     Last attempt to quit:  1998     Years since quittin.0    Smokeless tobacco: Never Used   Substance and Sexual Activity    Alcohol use: Yes     Comment: social use of alcohol    Drug use: No    Sexual activity: Not Currently     Birth control/protection: None     Review of Systems   Constitutional: Negative.  Negative for activity change, appetite change, fatigue and fever.   HENT: Negative.    Eyes: Negative.    Respiratory: Negative.  Negative for shortness of breath and wheezing.    Cardiovascular: Positive for leg swelling.   Gastrointestinal: Negative.  Negative for abdominal pain, diarrhea, nausea and vomiting.   Endocrine: Negative.    Genitourinary: Negative.  Negative for dysuria, frequency and urgency.   Musculoskeletal: Positive for myalgias.   Skin: Positive for color change and wound.   Allergic/Immunologic: Negative.    Neurological: Negative.  Negative for dizziness, speech difficulty, weakness and headaches.   Hematological: Negative.    Psychiatric/Behavioral: Negative.      Objective:     Vital Signs (Most Recent):  Temp: 98.3 °F (36.8 °C) (20 1354)  Pulse: 106 (20)  Resp: 18 (20)  BP: (!) 150/95 (20)  SpO2: 96 % (20) Vital Signs (24h Range):  Temp:  [98.3 °F (36.8 °C)] 98.3 °F (36.8 °C)  Pulse:  [] 106  Resp:  [17-35] 18  SpO2:  [96 %-97 %] 96 %  BP: (125-150)/() 150/95     Weight: 98.8 kg (217 lb 13 oz)  Body mass index is 32.64 kg/m².    Physical Exam   Constitutional: He appears well-developed and well-nourished.   Unkempt    HENT:   Head: Normocephalic and atraumatic.   Eyes: Pupils are equal, round, and reactive to light. EOM are normal.   Neck: Normal range of motion. Neck supple.   Cardiovascular: Normal rate, regular rhythm, normal heart sounds, intact distal pulses and normal pulses.   Pulmonary/Chest: Effort normal and breath sounds normal. No accessory muscle usage. No tachypnea. No respiratory distress.   Abdominal: Soft. Normal  appearance and bowel sounds are normal. There is no tenderness.   Musculoskeletal: Normal range of motion. He exhibits edema (2-3+ edema to carmela LEs ).   Neurological: He is alert. He has normal reflexes.   Oriented to person and place    Skin: Skin is warm, dry and intact. Capillary refill takes less than 2 seconds.   Warmth and erythema to Right lower extremity    Chronic venous changes   See pics below    Psychiatric: He has a normal mood and affect. His speech is normal and behavior is normal. Judgment and thought content normal. Cognition and memory are normal.   Nursing note and vitals reviewed.           Significant Labs:   Blood Culture: No results for input(s): LABBLOO in the last 48 hours.  BMP:   Recent Labs   Lab 01/11/20  1435   *      K 3.8      CO2 24   BUN 12   CREATININE 0.8   CALCIUM 8.7   MG 1.8     CBC:   Recent Labs   Lab 01/11/20  1435   WBC 4.27   HGB 12.1*   HCT 37.2*        CMP:   Recent Labs   Lab 01/11/20  1435      K 3.8      CO2 24   *   BUN 12   CREATININE 0.8   CALCIUM 8.7   PROT 9.2*   ALBUMIN 3.2*   BILITOT 0.6   ALKPHOS 67   AST 18   ALT 12   ANIONGAP 10   EGFRNONAA >60     Troponin:   Recent Labs   Lab 01/11/20  1435   TROPONINI 0.158*     All pertinent labs within the past 24 hours have been reviewed.    Significant Imaging:   Imaging Results          US Lower Extremity Veins Bilateral (Final result)  Result time 01/11/20 16:34:27    Final result by Vladislav Fields MD (01/11/20 16:34:27)                 Impression:      Negative for lower extremity DVT, bilaterally.      Electronically signed by: Vladislav Fields  Date:    01/11/2020  Time:    16:34             Narrative:    EXAMINATION:  US LOWER EXTREMITY VEINS BILATERAL    CLINICAL HISTORY:  Bilateral lower extremity swelling.    COMPARISON:  None.    FINDINGS:  The deep veins demonstrate a normal appearance of the common femoral vein, deep profunda, superficial femoral and  popliteal vein.  There is no evidence of deep venous thrombosis.  Additionally, tibial and peroneal veins are patent.                               X-Ray Chest AP Portable (Final result)  Result time 01/11/20 15:08:58    Final result by Adithya Walker MD (01/11/20 15:08:58)                 Impression:      See above      Electronically signed by: Adithya Walker MD  Date:    01/11/2020  Time:    15:08             Narrative:    EXAMINATION:  XR CHEST AP PORTABLE    CLINICAL HISTORY:  Other specified soft tissue disorders    FINDINGS:  Comparison study 03/26/2016.  Mild cardiomegaly with prominent epicardial fat pad, stable.  Mild aortic tortuosity with atherosclerosis.  No vascular congestion.  Right pleural effusion with peripheral basilar opacity.  Bibasilar haziness, likely layering pleural fluid including the on the left.  Otherwise, lungs are clear.

## 2020-01-12 NOTE — PLAN OF CARE
Hospital bed ordered.  Spoke to Humza with [facility].  Request order be faxed to him at [phone].  Advised patient the bed will be delivered tomorrow.

## 2020-01-12 NOTE — HPI
79 y.o. male patient with a PMHx of A fib, Anemia, CHF, Bilateral carotid artery disease, Hyperlipidemia, HTN, Prostate hypertrophy, stroke.  Pt presents with BLE edema and cellulitis associated with it. Consult is for abnormal troponin- flat, minimal.  Patient denies CP, angina or anginal equivalent.EKG with chronic afib, no ischemic cchnges  CHR with B pleral effusions R > left. Echo is nml

## 2020-01-12 NOTE — CONSULTS
[facility] -   Cardiology  Consult Note    Patient Name: Jatin Kohler  MRN: [id number]  Admission Date: 1/11/2020  Hospital Length of Stay: 0 days  Code Status: Full Code   Attending Provider: Angelo Hernandez MD   Consulting Provider: Johnson San MD  Primary Care Physician: Addi Saldaña MD  Principal Problem:Venous stasis dermatitis of both lower extremities    Patient information was obtained from patient and ER records.     Inpatient consult to Cardiology  Consult performed by: Johnson San MD  Consult ordered by: Angelo Hernandez MD        Subjective:     Chief Complaint:  Abnormal troponin     HPI:   79 y.o. male patient with a PMHx of A fib, Anemia, CHF, Bilateral carotid artery disease, Hyperlipidemia, HTN, Prostate hypertrophy, stroke.  Pt presents with BLE edema and cellulitis associated with it. Consult is for abnormal troponin- flat, minimal.  Patient denies CP, angina or anginal equivalent.EKG with chronic afib, no ischemic cchnges  CHR with B pleral effusions R > left. Echo is nml    Past Medical History:   Diagnosis Date    *Atrial fibrillation     Anemia due to blood loss, chronic 10/15/2013    Anxiety     Bilateral carotid artery disease 6/2/2016    CHF (congestive heart failure)     Depression     Hyperlipidemia     Hypertension     Prostate hypertrophy     Sleep apnea 9/12/2013    Stroke     Venous insufficiency 9/12/2013       Past Surgical History:   Procedure Laterality Date    APPENDECTOMY      CATARACT EXTRACTION, BILATERAL  08/2013    CHOLECYSTECTOMY      COLON SURGERY      diverticulitis    EYE SURGERY  08/2013    cataract    HEMORRHOID SURGERY      JOINT REPLACEMENT      left hip    WOUND DEBRIDEMENT         Review of patient's allergies indicates:   Allergen Reactions    No known allergies        No current facility-administered medications on file prior to encounter.      Current Outpatient Medications on File Prior to Encounter    Medication Sig    aspirin (ECOTRIN) 81 MG EC tablet Take 1 tablet (81 mg total) by mouth once daily.    diltiaZEM (CARDIZEM CD) 120 MG Cp24 Take 1 capsule (120 mg total) by mouth once daily.    furosemide (LASIX) 40 MG tablet TAKE 1 TABLET(40 MG) BY MOUTH DAILY AS NEEDED    HYDROcodone-acetaminophen (NORCO)  mg per tablet Take 1 tablet by mouth every 4 (four) hours as needed for Pain.    LORazepam (ATIVAN) 1 MG tablet Take 1 tablet (1 mg total) by mouth 2 (two) times daily as needed for Anxiety.    naproxen sodium (ANAPROX) 220 MG tablet Take 220 mg by mouth 2 (two) times daily as needed.    oxybutynin (DITROPAN-XL) 10 MG 24 hr tablet Take 1 tablet (10 mg total) by mouth once daily.    OXYGEN-AIR DELIVERY SYSTEMS [facility] [place]      Family History     Problem Relation (Age of Onset)    Diabetes Mother, Sister, Sister, Sister    Heart disease Mother    Hypertension Brother        Tobacco Use    Smoking status: Former Smoker     Packs/day: 0.50     Years: 20.00     Pack years: 10.00     Types: Cigarettes     Last attempt to quit: 1998     Years since quittin.0    Smokeless tobacco: Never Used   Substance and Sexual Activity    Alcohol use: Yes     Comment: social use of alcohol    Drug use: No    Sexual activity: Not Currently     Birth control/protection: None     Review of Systems   Constitution: Negative. Negative for weight gain.   HENT: Negative.    Eyes: Negative.    Cardiovascular: Negative.  Negative for chest pain, leg swelling and palpitations.   Respiratory: Negative.  Negative for shortness of breath.    Endocrine: Negative.    Hematologic/Lymphatic: Negative.    Skin: Negative.    Musculoskeletal: Negative for muscle weakness.   Gastrointestinal: Negative.    Genitourinary: Negative.    Neurological: Negative.  Negative for dizziness.   Psychiatric/Behavioral: Negative.    Allergic/Immunologic: Negative.      Objective:     Vital Signs (Most Recent):  Temp: 98.7 °F (37.1 °C) (20  1116)  Pulse: 100 (01/12/20 1116)  Resp: 18 (01/12/20 1116)  BP: 129/73 (01/12/20 1116)  SpO2: 97 % (01/12/20 1116) Vital Signs (24h Range):  Temp:  [98 °F (36.7 °C)-98.9 °F (37.2 °C)] 98.7 °F (37.1 °C)  Pulse:  [] 100  Resp:  [16-35] 18  SpO2:  [91 %-98 %] 97 %  BP: (125-150)/() 129/73     Weight: 98 kg (216 lb 0.8 oz)  Body mass index is 32.37 kg/m².    SpO2: 97 %  O2 Device (Oxygen Therapy): room air      Intake/Output Summary (Last 24 hours) at 1/12/2020 1245  Last data filed at 1/12/2020 0500  Gross per 24 hour   Intake 390 ml   Output 2120 ml   Net -1730 ml       Lines/Drains/Airways     Peripheral Intravenous Line     Name Duration         Peripheral IV - Single Lumen 01/11/20 20 G Right Antecubital 1 day                Physical Exam   Constitutional: He is oriented to person, place, and time. He appears well-developed and well-nourished.   HENT:   Head: Normocephalic and atraumatic.   Eyes: Pupils are equal, round, and reactive to light. Conjunctivae are normal.   Neck: Normal range of motion. Neck supple.   Cardiovascular: Normal rate, normal heart sounds and intact distal pulses. An irregularly irregular rhythm present.   Pulmonary/Chest: Effort normal and breath sounds normal.   Abdominal: Soft. Bowel sounds are normal.   Neurological: He is alert and oriented to person, place, and time.   Skin: Skin is warm and dry.   Psychiatric: He has a normal mood and affect.   Nursing note and vitals reviewed.      Significant Labs: All pertinent lab results from the last 24 hours have been reviewed.    Significant Imaging: X-Ray: CXR: X-Ray Chest 1 View (CXR): No results found for this visit on 01/11/20.    Assessment and Plan:     Elevated troponin  Secondary to demand ischemia continue tx for LE edema and B pleural effusions, outpatient stress test        VTE Risk Mitigation (From admission, onward)         Ordered     heparin (porcine) injection 5,000 Units  Every 8 hours      01/11/20 1342      Reason for no Mechanical VTE Prophylaxis  Once     Question:  Reasons:  Answer:  Physician Provided (leave comment)  Comment:  b/l LEXT Venous Stasis Dermatitis    01/11/20 1813     IP VTE HIGH RISK PATIENT  Once      01/11/20 1813                Thank you for your consult. I will follow-up with patient. Please contact us if you have any additional questions.    Johnson San MD  Cardiology   [facility] -

## 2020-01-12 NOTE — H&P
[facility] Hospital Medicine  History & Physical    Patient Name: Jatin Kohler  MRN: [id number]  Admission Date: 1/11/2020  Attending Physician: Angelo Hernandez MD   Primary Care Provider: Addi Saldaña MD         Patient information was obtained from patient, relative(s) and ER records.     Subjective:     Principal Problem:Venous stasis dermatitis of both lower extremities    Chief Complaint:   Chief Complaint   Patient presents with    Leg Swelling     redness, and swelling        HPI: Jatin Kohler is a 79 y.o. male patient with a PMHx of A fib, Anemia, CHF, Bilateral carotid artery disease, Hyperlipidemia, HTN, Prostate hypertrophy, stroke, who presents to the Emergency Department for bilateral leg swelling which onset gradually PTA. Associated sxs include bilateral leg pain and dizziness. Patient denies any fever, chills, n/v/d, CP, SOB, HA, abd pain, weakness, numbness, and all other sxs at this time.  Daughter reports patient is noncompliant with medications. Daughter reports she does not know when he last took his medications. ED work-up resulted troponin of 0.158, otherwise labs unremarkable. Lorenzo LE US negative for lower extremity DVT, bilaterally. Hospital medicine called for admission.     Past Medical History:   Diagnosis Date    *Atrial fibrillation     Anemia due to blood loss, chronic 10/15/2013    Anxiety     Bilateral carotid artery disease 6/2/2016    CHF (congestive heart failure)     Depression     Hyperlipidemia     Hypertension     Prostate hypertrophy     Sleep apnea 9/12/2013    Stroke     Venous insufficiency 9/12/2013       Past Surgical History:   Procedure Laterality Date    APPENDECTOMY      CATARACT EXTRACTION, BILATERAL  08/2013    CHOLECYSTECTOMY      COLON SURGERY      diverticulitis    EYE SURGERY  08/2013    cataract    HEMORRHOID SURGERY      JOINT REPLACEMENT      left hip    WOUND DEBRIDEMENT         Review of patient's allergies  indicates:   Allergen Reactions    No known allergies        No current facility-administered medications on file prior to encounter.      Current Outpatient Medications on File Prior to Encounter   Medication Sig    aspirin (ECOTRIN) 81 MG EC tablet Take 1 tablet (81 mg total) by mouth once daily.    diltiaZEM (CARDIZEM CD) 120 MG Cp24 Take 1 capsule (120 mg total) by mouth once daily.    furosemide (LASIX) 40 MG tablet TAKE 1 TABLET(40 MG) BY MOUTH DAILY AS NEEDED    HYDROcodone-acetaminophen (NORCO)  mg per tablet Take 1 tablet by mouth every 4 (four) hours as needed for Pain.    LORazepam (ATIVAN) 1 MG tablet Take 1 tablet (1 mg total) by mouth 2 (two) times daily as needed for Anxiety.    naproxen sodium (ANAPROX) 220 MG tablet Take 220 mg by mouth 2 (two) times daily as needed.    oxybutynin (DITROPAN-XL) 10 MG 24 hr tablet Take 1 tablet (10 mg total) by mouth once daily.    OXYGEN-AIR DELIVERY SYSTEMS [facility]      Family History     Problem Relation (Age of Onset)    Diabetes Mother, Sister, Sister, Sister    Heart disease Mother    Hypertension Brother        Tobacco Use    Smoking status: Former Smoker     Packs/day: 0.50     Years: 20.00     Pack years: 10.00     Types: Cigarettes     Last attempt to quit: 1998     Years since quittin.0    Smokeless tobacco: Never Used   Substance and Sexual Activity    Alcohol use: Yes     Comment: social use of alcohol    Drug use: No    Sexual activity: Not Currently     Birth control/protection: None     Review of Systems   Constitutional: Negative.  Negative for activity change, appetite change, fatigue and fever.   HENT: Negative.    Eyes: Negative.    Respiratory: Negative.  Negative for shortness of breath and wheezing.    Cardiovascular: Positive for leg swelling.   Gastrointestinal: Negative.  Negative for abdominal pain, diarrhea, nausea and vomiting.   Endocrine: Negative.    Genitourinary: Negative.  Negative for dysuria, frequency  and urgency.   Musculoskeletal: Positive for myalgias.   Skin: Positive for color change and wound.   Allergic/Immunologic: Negative.    Neurological: Negative.  Negative for dizziness, speech difficulty, weakness and headaches.   Hematological: Negative.    Psychiatric/Behavioral: Negative.      Objective:     Vital Signs (Most Recent):  Temp: 98.3 °F (36.8 °C) (01/11/20 1354)  Pulse: 106 (01/11/20 1818)  Resp: 18 (01/11/20 1818)  BP: (!) 150/95 (01/11/20 1818)  SpO2: 96 % (01/11/20 1818) Vital Signs (24h Range):  Temp:  [98.3 °F (36.8 °C)] 98.3 °F (36.8 °C)  Pulse:  [] 106  Resp:  [17-35] 18  SpO2:  [96 %-97 %] 96 %  BP: (125-150)/() 150/95     Weight: 98.8 kg (217 lb 13 oz)  Body mass index is 32.64 kg/m².    Physical Exam   Constitutional: He appears well-developed and well-nourished.   Unkempt    HENT:   Head: Normocephalic and atraumatic.   Eyes: Pupils are equal, round, and reactive to light. EOM are normal.   Neck: Normal range of motion. Neck supple.   Cardiovascular: Normal rate, regular rhythm, normal heart sounds, intact distal pulses and normal pulses.   Pulmonary/Chest: Effort normal and breath sounds normal. No accessory muscle usage. No tachypnea. No respiratory distress.   Abdominal: Soft. Normal appearance and bowel sounds are normal. There is no tenderness.   Musculoskeletal: Normal range of motion. He exhibits edema (2-3+ edema to carmela LEs ).   Neurological: He is alert. He has normal reflexes.   Oriented to person and place    Skin: Skin is warm, dry and intact. Capillary refill takes less than 2 seconds.   Warmth and erythema to Right lower extremity    Chronic venous changes   See pics below    Psychiatric: He has a normal mood and affect. His speech is normal and behavior is normal. Judgment and thought content normal. Cognition and memory are normal.   Nursing note and vitals reviewed.           Significant Labs:   Blood Culture: No results for input(s): LABBLOO in the last 48  hours.  BMP:   Recent Labs   Lab 01/11/20  1435   *      K 3.8      CO2 24   BUN 12   CREATININE 0.8   CALCIUM 8.7   MG 1.8     CBC:   Recent Labs   Lab 01/11/20  1435   WBC 4.27   HGB 12.1*   HCT 37.2*        CMP:   Recent Labs   Lab 01/11/20  1435      K 3.8      CO2 24   *   BUN 12   CREATININE 0.8   CALCIUM 8.7   PROT 9.2*   ALBUMIN 3.2*   BILITOT 0.6   ALKPHOS 67   AST 18   ALT 12   ANIONGAP 10   EGFRNONAA >60     Troponin:   Recent Labs   Lab 01/11/20  1435   TROPONINI 0.158*     All pertinent labs within the past 24 hours have been reviewed.    Significant Imaging:   Imaging Results          US Lower Extremity Veins Bilateral (Final result)  Result time 01/11/20 16:34:27    Final result by Vladislav Fields MD (01/11/20 16:34:27)                 Impression:      Negative for lower extremity DVT, bilaterally.      Electronically signed by: Vladislav Fields  Date:    01/11/2020  Time:    16:34             Narrative:    EXAMINATION:  US LOWER EXTREMITY VEINS BILATERAL    CLINICAL HISTORY:  Bilateral lower extremity swelling.    COMPARISON:  None.    FINDINGS:  The deep veins demonstrate a normal appearance of the common femoral vein, deep profunda, superficial femoral and popliteal vein.  There is no evidence of deep venous thrombosis.  Additionally, tibial and peroneal veins are patent.                               X-Ray Chest AP Portable (Final result)  Result time 01/11/20 15:08:58    Final result by Adithya Walker MD (01/11/20 15:08:58)                 Impression:      See above      Electronically signed by: Adithya Walker MD  Date:    01/11/2020  Time:    15:08             Narrative:    EXAMINATION:  XR CHEST AP PORTABLE    CLINICAL HISTORY:  Other specified soft tissue disorders    FINDINGS:  Comparison study 03/26/2016.  Mild cardiomegaly with prominent epicardial fat pad, stable.  Mild aortic tortuosity with atherosclerosis.  No vascular congestion.   Right pleural effusion with peripheral basilar opacity.  Bibasilar haziness, likely layering pleural fluid including the on the left.  Otherwise, lungs are clear.                              Assessment/Plan:     * Venous stasis dermatitis of both lower extremities  Wound Care  Monitor      Elevated troponin  Serial Enzymes  Telemetry  OBS  ASA  BB        Leg swelling  Diuresis  Elevate legs  Wound Care      Left ventricular diastolic dysfunction with preserved systolic function  2D Echo  Monitor          RAQUEL (obstructive sleep apnea)  CPAP  Monitor      ETOH abuse  Stable - Patient not currently drinking  Monitor      Anxiety  Ativan PRN      BPH (benign prostatic hyperplasia)  Monitor        VTE Risk Mitigation (From admission, onward)         Ordered     heparin (porcine) injection 5,000 Units  Every 8 hours      01/11/20 1813     Reason for no Mechanical VTE Prophylaxis  Once     Question:  Reasons:  Answer:  Physician Provided (leave comment)  Comment:  b/l LEXT Venous Stasis Dermatitis    01/11/20 1813     IP VTE HIGH RISK PATIENT  Once      01/11/20 1813                   Ivone Causey NP  Department of Hospital Medicine   [facility]

## 2020-01-12 NOTE — ASSESSMENT & PLAN NOTE
Secondary to demand ischemia continue tx for LE edema and B pleural effusions, outpatient stress test

## 2020-01-12 NOTE — HPI
Jatin Kohler is a 79 y.o. male patient with a PMHx of A fib, Anemia, CHF, Bilateral carotid artery disease, Hyperlipidemia, HTN, Prostate hypertrophy, stroke, who presents to the Emergency Department for bilateral leg swelling which onset gradually PTA. Associated sxs include bilateral leg pain and dizziness. Patient denies any fever, chills, n/v/d, CP, SOB, HA, abd pain, weakness, numbness, and all other sxs at this time. Daughter reports patient is noncompliant with medications. Daughter reports she does not know when he last took his medications. ED work-up resulted troponin of 0.158, otherwise labs unremarkable. Lorenzo LE US negative for lower extremity DVT, bilaterally. Hospital medicine called for admission.

## 2020-01-13 NOTE — DISCHARGE SUMMARY
[facility] Hospital Medicine  Discharge Summary      Patient Name: Jatin Kohler  MRN: [id number]  Admission Date: 1/11/2020  Hospital Length of Stay: 0 days  Discharge Date and Time: 1/12/2020  5:07 PM  Attending Physician: No att. providers found   Discharging Provider: Lyndsay Hernandez MD  Primary Care Provider: Addi Saldaña MD      HPI:   Jatin Kohler is a 79 y.o. male patient with a PMHx of A fib, Anemia, CHF, Bilateral carotid artery disease, Hyperlipidemia, HTN, Prostate hypertrophy, stroke, who presents to the Emergency Department for bilateral leg swelling which onset gradually PTA. Associated sxs include bilateral leg pain and dizziness. Patient denies any fever, chills, n/v/d, CP, SOB, HA, abd pain, weakness, numbness, and all other sxs at this time.  Daughter reports patient is noncompliant with medications. Daughter reports she does not know when he last took his medications. ED work-up resulted troponin of 0.158, otherwise labs unremarkable. Lorenzo LE US negative for lower extremity DVT, bilaterally. Hospital medicine called for admission.     * No surgery found *      Hospital Course:   Patient 78 yo male placed in observation for B/L Lower ext wounds, debility and failure to thrive. Patient with troponin elevation and was evaluated by cardiology who attributed rise to demand ischemia. Patient stabilized and diuresed with good effect. Patient refused placement to assist with ADL's but did agree to HH, Aides and DME. Patient Ruled out for ACS, POA advised of our recommendation for a Stress Test as outpatient. Patient declining this evaluation. Patient to follow up with his PCP and has committed to medication compliance.         Consults:   Consults (From admission, onward)        Status Ordering Provider     Inpatient consult to Cardiology  Once     Provider:  Johnson San MD    Completed LYNDSAY HERNANDEZ     Inpatient consult to Hospitalist  Once     Provider:  Pat VILLAFANA  "VANE Celestin    Acknowledged COBY MARSH          No new Assessment & Plan notes have been filed under this hospital service since the last note was generated.  Service: Hospital Medicine    Final Active Diagnoses:    Diagnosis Date Noted POA    PRINCIPAL PROBLEM:  Venous stasis dermatitis of both lower extremities [I87.2] 01/11/2020 Yes    Elevated troponin [R79.89] 01/11/2020 Yes    RAQUEL (obstructive sleep apnea) [G47.33] 02/05/2015 Yes     Chronic    BPH (benign prostatic hyperplasia) [N40.0] 08/07/2013 Yes    Anxiety [F41.9] 08/07/2013 Yes      Problems Resolved During this Admission:    Diagnosis Date Noted Date Resolved POA    Leg swelling [M79.89] 01/11/2020 01/12/2020 Yes    Left ventricular diastolic dysfunction with preserved systolic function [I51.9] 06/02/2016 01/12/2020 Yes     Chronic    ETOH abuse [F10.10] 02/04/2015 01/12/2020 Yes       Discharged Condition: stable    [place] [facility]    Follow Up:  Follow-up Information     Addi Saldaña MD In 3 days.    Specialty:  Family Medicine  Contact information:  [address] [address] [address]  [phone]                 Patient Instructions:      HOSPITAL BED FOR HOME USE     Order Specific Question Answer Comments   Type: Semi-electric    Length of need (1-99 months): 99    Does patient have medical equipment at home? power chair    Does patient have medical equipment at home? shower chair    Height: 5' 8.5" (1.74 m)    Weight: 98 kg (216 lb 0.8 oz)    Please check all that apply: Patient requires positioning of the body in ways not feasible in an ordinary bed due to a medical condition which is expected to last at least one month.    Please check all that apply: Patient requires frequent changes in body position and/or has an immediate need for a change in body position.      Ambulatory referral to Home Health   Referral Priority: Routine Referral Type: Home Health   Referral Reason: Specialty Services " Required   Requested Specialty: Home Health Services   Number of Visits Requested: 1     Diet Cardiac     Activity as tolerated       Significant Diagnostic Studies: Labs:   BMP:   Recent Labs   Lab 01/11/20  1435 01/12/20  0446   * 99    136   K 3.8 3.5    102   CO2 24 24   BUN 12 15   CREATININE 0.8 0.9   CALCIUM 8.7 8.4*   MG 1.8  --    , CMP   Recent Labs   Lab 01/11/20  1435 01/12/20  0446    136   K 3.8 3.5    102   CO2 24 24   * 99   BUN 12 15   CREATININE 0.8 0.9   CALCIUM 8.7 8.4*   PROT 9.2*  --    ALBUMIN 3.2*  --    BILITOT 0.6  --    ALKPHOS 67  --    AST 18  --    ALT 12  --    ANIONGAP 10 10   ESTGFRAFRICA >60 >60   EGFRNONAA >60 >60   , CBC   Recent Labs   Lab 01/11/20  1435 01/12/20  0446   WBC 4.27 3.40*   HGB 12.1* 11.9*   HCT 37.2* 35.7*    203   , Troponin   Recent Labs   Lab 01/12/20  0023   TROPONINI 0.137*    and All labs within the past 24 hours have been reviewed    Pending Diagnostic Studies:     None         Medications:  Reconciled Home Medications:      Medication List      CONTINUE taking these medications    aspirin 81 MG EC tablet  Commonly known as:  ECOTRIN  Take 1 tablet (81 mg total) by mouth once daily.     diltiaZEM 120 MG Cp24  Commonly known as:  CARDIZEM CD  Take 1 capsule (120 mg total) by mouth once daily.     furosemide 40 MG tablet  Commonly known as:  LASIX  TAKE 1 TABLET(40 MG) BY MOUTH DAILY AS NEEDED     HYDROcodone-acetaminophen  mg per tablet  Commonly known as:  NORCO  Take 1 tablet by mouth every 4 (four) hours as needed for Pain.     LORazepam 1 MG tablet  Commonly known as:  ATIVAN  Take 1 tablet (1 mg total) by mouth 2 (two) times daily as needed for Anxiety.     naproxen sodium 220 MG tablet  Commonly known as:  ANAPROX  Take 220 mg by mouth 2 (two) times daily as needed.     oxybutynin 10 MG 24 hr tablet  Commonly known as:  DITROPAN-XL  Take 1 tablet (10 mg total) by mouth once daily.     OXYGEN-AIR  DELIVERY SYSTEMS MISC            Indwelling Lines/Drains at time of discharge:   Lines/Drains/Airways     None                 Time spent on the discharge of patient: 35 minutes  Patient was seen and examined on the date of discharge and determined to be suitable for discharge.         Angelo Hernandez MD  Department of Hospital Medicine  [facility]

## 2020-01-13 NOTE — HOSPITAL COURSE
Patient 80 yo male placed in observation for B/L Lower ext wounds, debility and failure to thrive. Patient with troponin elevation and was evaluated by cardiology who attributed rise to demand ischemia. Patient stabilized and diuresed with good effect. Patient refused placement to assist with ADL's but did agree to HH, Aides and DME. Patient Ruled out for ACS, POA advised of our recommendation for a Stress Test as outpatient. Patient declining this evaluation. Patient to follow up with his PCP and has committed to medication compliance.

## 2020-01-20 ENCOUNTER — OFFICE VISIT (OUTPATIENT)
Dept: FAMILY MEDICINE | Facility: CLINIC | Age: 80
End: 2020-01-20
Payer: MEDICARE

## 2020-01-20 VITALS
HEART RATE: 92 BPM | TEMPERATURE: 98 F | DIASTOLIC BLOOD PRESSURE: 78 MMHG | OXYGEN SATURATION: 98 % | SYSTOLIC BLOOD PRESSURE: 132 MMHG

## 2020-01-20 DIAGNOSIS — I87.2 VENOUS STASIS DERMATITIS OF BOTH LOWER EXTREMITIES: ICD-10-CM

## 2020-01-20 DIAGNOSIS — F41.9 ANXIETY: ICD-10-CM

## 2020-01-20 DIAGNOSIS — R60.0 PEDAL EDEMA: Primary | ICD-10-CM

## 2020-01-20 DIAGNOSIS — R79.89 ELEVATED TROPONIN: ICD-10-CM

## 2020-01-20 PROCEDURE — 1126F PR PAIN SEVERITY QUANTIFIED, NO PAIN PRESENT: ICD-10-PCS | Mod: HCNC,S$GLB,, | Performed by: FAMILY MEDICINE

## 2020-01-20 PROCEDURE — 3075F SYST BP GE 130 - 139MM HG: CPT | Mod: HCNC,CPTII,S$GLB, | Performed by: FAMILY MEDICINE

## 2020-01-20 PROCEDURE — 3075F PR MOST RECENT SYSTOLIC BLOOD PRESS GE 130-139MM HG: ICD-10-PCS | Mod: HCNC,CPTII,S$GLB, | Performed by: FAMILY MEDICINE

## 2020-01-20 PROCEDURE — 1126F AMNT PAIN NOTED NONE PRSNT: CPT | Mod: HCNC,S$GLB,, | Performed by: FAMILY MEDICINE

## 2020-01-20 PROCEDURE — 99999 PR PBB SHADOW E&M-EST. PATIENT-LVL III: ICD-10-PCS | Mod: PBBFAC,HCNC,, | Performed by: FAMILY MEDICINE

## 2020-01-20 PROCEDURE — 1100F PR PT FALLS ASSESS DOC 2+ FALLS/FALL W/INJURY/YR: ICD-10-PCS | Mod: HCNC,CPTII,S$GLB, | Performed by: FAMILY MEDICINE

## 2020-01-20 PROCEDURE — 1100F PTFALLS ASSESS-DOCD GE2>/YR: CPT | Mod: HCNC,CPTII,S$GLB, | Performed by: FAMILY MEDICINE

## 2020-01-20 PROCEDURE — 1159F PR MEDICATION LIST DOCUMENTED IN MEDICAL RECORD: ICD-10-PCS | Mod: HCNC,S$GLB,, | Performed by: FAMILY MEDICINE

## 2020-01-20 PROCEDURE — 1159F MED LIST DOCD IN RCRD: CPT | Mod: HCNC,S$GLB,, | Performed by: FAMILY MEDICINE

## 2020-01-20 PROCEDURE — 3078F DIAST BP <80 MM HG: CPT | Mod: HCNC,CPTII,S$GLB, | Performed by: FAMILY MEDICINE

## 2020-01-20 PROCEDURE — 3078F PR MOST RECENT DIASTOLIC BLOOD PRESSURE < 80 MM HG: ICD-10-PCS | Mod: HCNC,CPTII,S$GLB, | Performed by: FAMILY MEDICINE

## 2020-01-20 PROCEDURE — 3288F PR FALLS RISK ASSESSMENT DOCUMENTED: ICD-10-PCS | Mod: HCNC,CPTII,S$GLB, | Performed by: FAMILY MEDICINE

## 2020-01-20 PROCEDURE — 3288F FALL RISK ASSESSMENT DOCD: CPT | Mod: HCNC,CPTII,S$GLB, | Performed by: FAMILY MEDICINE

## 2020-01-20 PROCEDURE — 99214 OFFICE O/P EST MOD 30 MIN: CPT | Mod: HCNC,S$GLB,, | Performed by: FAMILY MEDICINE

## 2020-01-20 PROCEDURE — 99999 PR PBB SHADOW E&M-EST. PATIENT-LVL III: CPT | Mod: PBBFAC,HCNC,, | Performed by: FAMILY MEDICINE

## 2020-01-20 PROCEDURE — 99214 PR OFFICE/OUTPT VISIT, EST, LEVL IV, 30-39 MIN: ICD-10-PCS | Mod: HCNC,S$GLB,, | Performed by: FAMILY MEDICINE

## 2020-01-20 RX ORDER — FLUOXETINE HYDROCHLORIDE 20 MG/1
20 CAPSULE ORAL DAILY
Qty: 30 CAPSULE | Refills: 11 | Status: SHIPPED | OUTPATIENT
Start: 2020-01-20 | End: 2020-03-21

## 2020-01-20 NOTE — PROGRESS NOTES
History of Present Illness:    Patient is here with his daughter  Status post hospital discharge  He was hospitalized complains of some pedal edema a DVT was ruled out.  He was given Lasix and diuresed significantly.  His troponin was L elevated and was evaluated by Cardiology and was asked for outpatient follow-up and possible outpatient stress test.  He is feeling okay no chest pain or shortness of breath.  According to daughter he does get agitated and we sometimes wants something to calm him down  ROS:  Review of Systems   Constitutional: Negative for activity change, chills, fatigue, fever and unexpected weight change.   HENT: Negative for congestion, ear discharge, ear pain, hearing loss, postnasal drip and rhinorrhea.    Eyes: Negative for pain and visual disturbance.   Respiratory: Negative for cough and chest tightness.    Cardiovascular: Negative for chest pain and palpitations.   Gastrointestinal: Negative for abdominal pain, diarrhea and vomiting.   Endocrine: Negative for heat intolerance.   Genitourinary: Negative for dysuria, flank pain, frequency and hematuria.   Musculoskeletal: Negative for back pain and neck pain.   Skin: Negative for color change and rash.   Neurological: Negative for dizziness, tremors, seizures, numbness and headaches.   Psychiatric/Behavioral: Negative for agitation, hallucinations, self-injury, sleep disturbance and suicidal ideas. The patient is not nervous/anxious.        Past Medical History:   Diagnosis Date    *Atrial fibrillation     Anemia due to blood loss, chronic 10/15/2013    Anxiety     Bilateral carotid artery disease 6/2/2016    CHF (congestive heart failure)     Depression     Hyperlipidemia     Hypertension     Prostate hypertrophy     Sleep apnea 9/12/2013    Stroke     Venous insufficiency 9/12/2013       Social History:  Social History     Socioeconomic History    Marital status:      Spouse name: Not on file    Number of children:  Not on file    Years of education: Not on file    Highest education level: Not on file   Occupational History    Not on file   Social Needs    Financial resource strain: Not on file    Food insecurity:     Worry: Not on file     Inability: Not on file    Transportation needs:     Medical: Not on file     Non-medical: Not on file   Tobacco Use    Smoking status: Former Smoker     Packs/day: 0.50     Years: 20.00     Pack years: 10.00     Types: Cigarettes     Last attempt to quit: 1998     Years since quittin.0    Smokeless tobacco: Never Used   Substance and Sexual Activity    Alcohol use: Yes     Comment: social use of alcohol    Drug use: No    Sexual activity: Not Currently     Birth control/protection: None   Lifestyle    Physical activity:     Days per week: Not on file     Minutes per session: Not on file    Stress: Not on file   Relationships    Social connections:     Talks on phone: Not on file     Gets together: Not on file     Attends [employer] service: Not on file     Active member of club or organization: Not on file     Attends meetings of clubs or organizations: Not on file     Relationship status: Not on file   Other Topics Concern    Not on file   Social History Narrative    Not on file       Family History:   family history includes Diabetes in his mother, sister, sister, and sister; Heart disease in his mother; Hypertension in his brother.    Health Maintenance   Topic Date Due    Lipid Panel  2024    TETANUS VACCINE  2028    Pneumococcal Vaccine (65+ Low/Medium Risk)  Completed       Physical Exam:    Vital Signs  Temp: 97.5 °F (36.4 °C)  Temp src: Temporal  Pulse: 92  SpO2: 98 %  BP: 132/78  BP Location: Left arm  Patient Position: Sitting  Pain Score: 0-No pain]    There is no height or weight on file to calculate BMI.    Physical Exam   Constitutional: He is oriented to person, place, and time. He appears well-developed.   HENT:   Mouth/Throat:  Oropharynx is clear and moist.   Eyes: Pupils are equal, round, and reactive to light. Conjunctivae are normal.   Neck: Normal range of motion. Neck supple.   Cardiovascular: Normal rate, regular rhythm and normal heart sounds.   No murmur heard.  Pulmonary/Chest: Effort normal and breath sounds normal. No respiratory distress. He has no wheezes. He has no rales. He exhibits no tenderness.   Abdominal: Soft. He exhibits no distension and no mass. There is no tenderness. There is no guarding.   Musculoskeletal: He exhibits edema (Mild edema bilateral legs).        Right knee: Tenderness found. Medial joint line tenderness noted.   Patient has an extremely weak and unsteady gait.    Patient is wheelchair-bound      He has significant weakness involving the left hand.      Strength of the bilateral foot is 4 over 5.          Left hand weakness 2 /5   Lymphadenopathy:     He has no cervical adenopathy.   Neurological: He is alert and oriented to person, place, and time.   Skin: Skin is warm and dry.   Psychiatric: He has a normal mood and affect. His behavior is normal. Judgment and thought content normal.         Labs:    Lab Results   Component Value Date    WBC 3.40 (L) 01/12/2020    HGB 11.9 (L) 01/12/2020    HCT 35.7 (L) 01/12/2020     01/12/2020    CHOL 149 06/25/2019    TRIG 77 06/25/2019    HDL 31 (L) 06/25/2019    ALT 12 01/11/2020    AST 18 01/11/2020     01/12/2020    K 3.5 01/12/2020     01/12/2020    CREATININE 0.9 01/12/2020    BUN 15 01/12/2020    CO2 24 01/12/2020    TSH 0.473 06/09/2015    PSA 0.65 03/19/2015    INR 1.0 01/12/2020    GLUF 107 10/08/2008    HGBA1C 6.3 (H) 03/19/2015           Assessment:      ICD-10-CM ICD-9-CM   1. Pedal edema R60.0 782.3   2. Venous stasis dermatitis of both lower extremities I87.2 454.1   3. Elevated troponin R79.89 790.6   4. Anxiety F41.9 300.00       Plan:  He has Home Health who is attending on him and will do therapy  Will recommend lymphedema  wrap and a continue to use Lasix  Will make appointment with Cardiology  Start on fluoxetine for anxiety  According to daughter is not very compliant with taking his medications on a regular basis      Orders Placed This Encounter   Procedures    Ambulatory referral to Cardiology       Current Outpatient Medications   Medication Sig Dispense Refill    aspirin (ECOTRIN) 81 MG EC tablet Take 1 tablet (81 mg total) by mouth once daily. 30 tablet 0    diltiaZEM (CARDIZEM CD) 120 MG Cp24 Take 1 capsule (120 mg total) by mouth once daily. 90 capsule 0    furosemide (LASIX) 40 MG tablet TAKE 1 TABLET(40 MG) BY MOUTH DAILY AS NEEDED 90 tablet 2    LORazepam (ATIVAN) 1 MG tablet Take 1 tablet (1 mg total) by mouth 2 (two) times daily as needed for Anxiety. 14 tablet 0    naproxen sodium (ANAPROX) 220 MG tablet Take 220 mg by mouth 2 (two) times daily as needed.      oxybutynin (DITROPAN-XL) 10 MG 24 hr tablet Take 1 tablet (10 mg total) by mouth once daily. 90 tablet 3    OXYGEN-AIR DELIVERY SYSTEMS MISC       FLUoxetine 20 MG capsule Take 1 capsule (20 mg total) by mouth once daily. 30 capsule 11     No current facility-administered medications for this visit.        Medications Discontinued During This Encounter   Medication Reason    HYDROcodone-acetaminophen (NORCO)  mg per tablet Patient no longer taking       No follow-ups on file.      Dr Addi Saldaña MD    Disclaimer: This note is prepared using voice recognition system and as such is likely to have errors and is not proof read.

## 2020-01-29 ENCOUNTER — EXTERNAL HOME HEALTH (OUTPATIENT)
Dept: HOME HEALTH SERVICES | Facility: HOSPITAL | Age: 80
End: 2020-01-29

## 2020-01-31 ENCOUNTER — TELEPHONE (OUTPATIENT)
Dept: FAMILY MEDICINE | Facility: CLINIC | Age: 80
End: 2020-01-31

## 2020-01-31 NOTE — TELEPHONE ENCOUNTER
Spoke with patients daughter Kenya and informed her that her sister was calling for a refill of Ativan. Kenya stated that not to send it in that she will contact her dad(the patient) and inform him.

## 2020-01-31 NOTE — TELEPHONE ENCOUNTER
----- Message from Leroy Fontenot sent at 1/31/2020  4:05 PM CST -----  Contact: Pt's daughter-Marialuisa Gagnon  Type:  RX Refill Request    Who Called:  Pt's daughter-Marialuisa Chelsi  Refill or New Rx: refill   RX Name and Strength: LORazepam (ATIVAN) 1 MG  How is the patient currently taking it? (ex. 1XDay): as needed   Is this a 30 day or 90 day RX:30   Preferred Pharmacy with phone number:   n/a  Local or Mail Order:  Mail order   Ordering Provider: dominic  Would the patient rather a call back or a response via [facility]? Call back   Best Call Back Number:[phone]  Additional Information: She is requesting to get the patient back on this medication and would like to speak with the staff as soon as possible, please advise.

## 2020-02-06 ENCOUNTER — TELEPHONE (OUTPATIENT)
Dept: FAMILY MEDICINE | Facility: CLINIC | Age: 80
End: 2020-02-06

## 2020-02-06 NOTE — TELEPHONE ENCOUNTER
Patient's daughter called to advise that her dad is not taking the fuloxetine that was prescribed to him

## 2020-02-06 NOTE — TELEPHONE ENCOUNTER
Sir, I did ask and she told me that he is refusing to take the prescribed medication so she took it out of his daily medications. She basically said if he's refusing, she is not going to force him

## 2020-02-13 ENCOUNTER — INITIAL CONSULT (OUTPATIENT)
Dept: CARDIOLOGY | Facility: CLINIC | Age: 80
End: 2020-02-13
Payer: MEDICARE

## 2020-02-13 VITALS
HEIGHT: 69 IN | DIASTOLIC BLOOD PRESSURE: 64 MMHG | HEART RATE: 96 BPM | BODY MASS INDEX: 32.24 KG/M2 | WEIGHT: 217.63 LBS | OXYGEN SATURATION: 96 % | SYSTOLIC BLOOD PRESSURE: 144 MMHG

## 2020-02-13 DIAGNOSIS — F10.11 ALCOHOL ABUSE, IN REMISSION: ICD-10-CM

## 2020-02-13 DIAGNOSIS — I87.2 VENOUS STASIS DERMATITIS OF BOTH LOWER EXTREMITIES: ICD-10-CM

## 2020-02-13 DIAGNOSIS — J43.9 MIXED RESTRICTIVE AND OBSTRUCTIVE LUNG DISEASE: Chronic | ICD-10-CM

## 2020-02-13 DIAGNOSIS — I69.354 HEMIPLEGIA AND HEMIPARESIS FOLLOWING CEREBRAL INFARCTION AFFECTING LEFT NON-DOMINANT SIDE: ICD-10-CM

## 2020-02-13 DIAGNOSIS — R79.89 ELEVATED TROPONIN: ICD-10-CM

## 2020-02-13 DIAGNOSIS — J44.9 PULMONARY HYPERTENSION DUE TO CHRONIC OBSTRUCTIVE PULMONARY DISEASE: Chronic | ICD-10-CM

## 2020-02-13 DIAGNOSIS — I69.30 HISTORY OF CEREBROVASCULAR ACCIDENT (CVA) WITH RESIDUAL DEFICIT: ICD-10-CM

## 2020-02-13 DIAGNOSIS — I27.9 CHRONIC PULMONARY HEART DISEASE: Chronic | ICD-10-CM

## 2020-02-13 DIAGNOSIS — I48.21 PERMANENT ATRIAL FIBRILLATION: Primary | ICD-10-CM

## 2020-02-13 DIAGNOSIS — R06.02 SOB (SHORTNESS OF BREATH): ICD-10-CM

## 2020-02-13 DIAGNOSIS — I77.9 CAROTID ARTERY DISEASE, UNSPECIFIED LATERALITY, UNSPECIFIED TYPE: Chronic | ICD-10-CM

## 2020-02-13 DIAGNOSIS — E66.01 MORBID OBESITY: Chronic | ICD-10-CM

## 2020-02-13 DIAGNOSIS — I70.0 ATHEROSCLEROSIS OF AORTA: Chronic | ICD-10-CM

## 2020-02-13 DIAGNOSIS — I27.23 PULMONARY HYPERTENSION DUE TO CHRONIC OBSTRUCTIVE PULMONARY DISEASE: Chronic | ICD-10-CM

## 2020-02-13 DIAGNOSIS — G47.33 OSA (OBSTRUCTIVE SLEEP APNEA): Chronic | ICD-10-CM

## 2020-02-13 DIAGNOSIS — J98.4 MIXED RESTRICTIVE AND OBSTRUCTIVE LUNG DISEASE: Chronic | ICD-10-CM

## 2020-02-13 PROCEDURE — 1126F AMNT PAIN NOTED NONE PRSNT: CPT | Mod: HCNC,S$GLB,, | Performed by: INTERNAL MEDICINE

## 2020-02-13 PROCEDURE — 1100F PTFALLS ASSESS-DOCD GE2>/YR: CPT | Mod: HCNC,CPTII,S$GLB, | Performed by: INTERNAL MEDICINE

## 2020-02-13 PROCEDURE — 3288F PR FALLS RISK ASSESSMENT DOCUMENTED: ICD-10-PCS | Mod: HCNC,CPTII,S$GLB, | Performed by: INTERNAL MEDICINE

## 2020-02-13 PROCEDURE — 1126F PR PAIN SEVERITY QUANTIFIED, NO PAIN PRESENT: ICD-10-PCS | Mod: HCNC,S$GLB,, | Performed by: INTERNAL MEDICINE

## 2020-02-13 PROCEDURE — 99999 PR PBB SHADOW E&M-EST. PATIENT-LVL III: CPT | Mod: PBBFAC,HCNC,, | Performed by: INTERNAL MEDICINE

## 2020-02-13 PROCEDURE — 99214 OFFICE O/P EST MOD 30 MIN: CPT | Mod: HCNC,S$GLB,, | Performed by: INTERNAL MEDICINE

## 2020-02-13 PROCEDURE — 3074F SYST BP LT 130 MM HG: CPT | Mod: HCNC,CPTII,S$GLB, | Performed by: INTERNAL MEDICINE

## 2020-02-13 PROCEDURE — 99499 UNLISTED E&M SERVICE: CPT | Mod: S$GLB,,, | Performed by: INTERNAL MEDICINE

## 2020-02-13 PROCEDURE — 99999 PR PBB SHADOW E&M-EST. PATIENT-LVL III: ICD-10-PCS | Mod: PBBFAC,HCNC,, | Performed by: INTERNAL MEDICINE

## 2020-02-13 PROCEDURE — 3078F PR MOST RECENT DIASTOLIC BLOOD PRESSURE < 80 MM HG: ICD-10-PCS | Mod: HCNC,CPTII,S$GLB, | Performed by: INTERNAL MEDICINE

## 2020-02-13 PROCEDURE — 3074F PR MOST RECENT SYSTOLIC BLOOD PRESSURE < 130 MM HG: ICD-10-PCS | Mod: HCNC,CPTII,S$GLB, | Performed by: INTERNAL MEDICINE

## 2020-02-13 PROCEDURE — 1100F PR PT FALLS ASSESS DOC 2+ FALLS/FALL W/INJURY/YR: ICD-10-PCS | Mod: HCNC,CPTII,S$GLB, | Performed by: INTERNAL MEDICINE

## 2020-02-13 PROCEDURE — 99499 RISK ADDL DX/OHS AUDIT: ICD-10-PCS | Mod: S$GLB,,, | Performed by: INTERNAL MEDICINE

## 2020-02-13 PROCEDURE — 3078F DIAST BP <80 MM HG: CPT | Mod: HCNC,CPTII,S$GLB, | Performed by: INTERNAL MEDICINE

## 2020-02-13 PROCEDURE — 99214 PR OFFICE/OUTPT VISIT, EST, LEVL IV, 30-39 MIN: ICD-10-PCS | Mod: HCNC,S$GLB,, | Performed by: INTERNAL MEDICINE

## 2020-02-13 PROCEDURE — 1159F PR MEDICATION LIST DOCUMENTED IN MEDICAL RECORD: ICD-10-PCS | Mod: HCNC,S$GLB,, | Performed by: INTERNAL MEDICINE

## 2020-02-13 PROCEDURE — 3288F FALL RISK ASSESSMENT DOCD: CPT | Mod: HCNC,CPTII,S$GLB, | Performed by: INTERNAL MEDICINE

## 2020-02-13 PROCEDURE — 1159F MED LIST DOCD IN RCRD: CPT | Mod: HCNC,S$GLB,, | Performed by: INTERNAL MEDICINE

## 2020-02-13 NOTE — LETTER
February 13, 2020      Addi Saldaña MD  [address] [address] [address]           [facility]  [address] [place] [address] , 2ND FLOOR  [facility] [address]  Phone: [phone]  Fax: [phone]          Patient: Jatin Kohler   MR Number: [id number]   YOB: 1940   Date of Visit: 2/13/2020       Dear Dr. Addi Saldaña:    Thank you for referring Jatin Kohler to me for evaluation. Attached you will find relevant portions of my assessment and plan of care.    If you have questions, please do not hesitate to call me. I look forward to following Jatin Kohler along with you.    Sincerely,    Shmuel Crespo MD    Enclosure  CC:  No Recipients    If you would like to receive this communication electronically, please contact [email or web address] or [phone] to request more information on [email or web address] access.    For providers and/or their staff who would like to refer a patient to [facility], please contact us through our one-stop-shop provider referral line, [facility], at [phone].    If you feel you have received this communication in error or would no longer like to receive these types of communications, please e-mail [email or web address]

## 2020-02-13 NOTE — PROGRESS NOTES
Subjective:   Patient ID:  Jatin Kohler is a 79 y.o. male who presents for evaluation of Atrial Fibrillation      HPI  A 80 yo male with chronic etoh abuse now in remission afib chronic cva not candidate for anticoagulation  Carotid disease chf sleep apnea untreated  [facility]. Last seen in . He was admitted to [facility] due to leg swelling wheeping was diuresed overnite and d/c home with home health. He is sedentary he is  In his wheel chair or scooter he dangles his feet he is not compliant with slat. He is on diuretics. He is not compliant with meds.he has no other issues clinically has tremors and spasm is his left sided weakness. I have reviewed hospital stay and echo. And cards consult note.   Past Medical History:   Diagnosis Date    *Atrial fibrillation     Anemia due to blood loss, chronic 10/15/2013    Anxiety     Bilateral carotid artery disease 2016    CHF (congestive heart failure)     Depression     Hyperlipidemia     Hypertension     Prostate hypertrophy     Sleep apnea 2013    Stroke     Venous insufficiency 2013       Past Surgical History:   Procedure Laterality Date    APPENDECTOMY      CATARACT EXTRACTION, BILATERAL  2013    CHOLECYSTECTOMY      COLON SURGERY      diverticulitis    EYE SURGERY  2013    cataract    HEMORRHOID SURGERY      JOINT REPLACEMENT      left hip    WOUND DEBRIDEMENT         Social History     Tobacco Use    Smoking status: Former Smoker     Packs/day: 0.50     Years: 20.00     Pack years: 10.00     Types: Cigarettes     Last attempt to quit: 1998     Years since quittin.1    Smokeless tobacco: Never Used   Substance Use Topics    Alcohol use: Not Currently     Comment: social use of alcohol    Drug use: No       Family History   Problem Relation Age of Onset    Heart disease Mother     Diabetes Mother     Diabetes Sister     Hypertension Brother     Diabetes Sister     Diabetes Sister         Current Outpatient Medications   Medication Sig    aspirin (ECOTRIN) 81 MG EC tablet Take 1 tablet (81 mg total) by mouth once daily.    diltiaZEM (CARDIZEM CD) 120 MG Cp24 Take 1 capsule (120 mg total) by mouth once daily.    furosemide (LASIX) 40 MG tablet TAKE 1 TABLET(40 MG) BY MOUTH DAILY AS NEEDED    naproxen sodium (ANAPROX) 220 MG tablet Take 220 mg by mouth 2 (two) times daily as needed.    oxybutynin (DITROPAN-XL) 10 MG 24 hr tablet Take 1 tablet (10 mg total) by mouth once daily.    OXYGEN-AIR DELIVERY SYSTEMS MISC     FLUoxetine 20 MG capsule Take 1 capsule (20 mg total) by mouth once daily. (Patient not taking: Reported on 2/13/2020)    LORazepam (ATIVAN) 1 MG tablet Take 1 tablet (1 mg total) by mouth 2 (two) times daily as needed for Anxiety. (Patient not taking: Reported on 2/13/2020)     No current facility-administered medications for this visit.      Current Outpatient Medications on File Prior to Visit   Medication Sig    aspirin (ECOTRIN) 81 MG EC tablet Take 1 tablet (81 mg total) by mouth once daily.    diltiaZEM (CARDIZEM CD) 120 MG Cp24 Take 1 capsule (120 mg total) by mouth once daily.    furosemide (LASIX) 40 MG tablet TAKE 1 TABLET(40 MG) BY MOUTH DAILY AS NEEDED    naproxen sodium (ANAPROX) 220 MG tablet Take 220 mg by mouth 2 (two) times daily as needed.    oxybutynin (DITROPAN-XL) 10 MG 24 hr tablet Take 1 tablet (10 mg total) by mouth once daily.    OXYGEN-AIR DELIVERY SYSTEMS MISC     FLUoxetine 20 MG capsule Take 1 capsule (20 mg total) by mouth once daily. (Patient not taking: Reported on 2/13/2020)    LORazepam (ATIVAN) 1 MG tablet Take 1 tablet (1 mg total) by mouth 2 (two) times daily as needed for Anxiety. (Patient not taking: Reported on 2/13/2020)     No current facility-administered medications on file prior to visit.        Review of patient's allergies indicates:   Allergen Reactions    No known allergies        Review of Systems   Constitution:  Negative for malaise/fatigue.   Eyes: Negative for blurred vision.   Cardiovascular: Positive for leg swelling. Negative for chest pain, claudication, cyanosis, dyspnea on exertion, irregular heartbeat, near-syncope, orthopnea, palpitations and paroxysmal nocturnal dyspnea.   Respiratory: Negative for cough, hemoptysis and shortness of breath.    Hematologic/Lymphatic: Negative for bleeding problem. Does not bruise/bleed easily.   Skin: Negative for dry skin and itching.   Musculoskeletal: Negative for falls, muscle weakness and myalgias.   Gastrointestinal: Negative for abdominal pain, diarrhea, heartburn, hematemesis, hematochezia and melena.   Genitourinary: Negative for flank pain and hematuria.   Neurological: Positive for focal weakness. Negative for dizziness, headaches, light-headedness, numbness, paresthesias, seizures and weakness.   Psychiatric/Behavioral: Negative for altered mental status and memory loss. The patient is not nervous/anxious.    Allergic/Immunologic: Negative for hives.       Objective:   Physical Exam   Constitutional: He is oriented to person, place, and time. He appears well-developed and well-nourished. No distress.   HENT:   Head: Normocephalic and atraumatic.   Eyes: Pupils are equal, round, and reactive to light. EOM are normal. Right eye exhibits no discharge. Left eye exhibits no discharge.   Neck: Neck supple. No JVD present. No thyromegaly present.   Cardiovascular: Normal rate, normal heart sounds and intact distal pulses. An irregularly irregular rhythm present. Exam reveals no gallop and no friction rub.   No murmur heard.  Pulses:       Carotid pulses are 2+ on the right side, and 2+ on the left side.       Radial pulses are 2+ on the right side, and 2+ on the left side.        Femoral pulses are 2+ on the right side, and 2+ on the left side.       Popliteal pulses are 2+ on the right side, and 2+ on the left side.        Dorsalis pedis pulses are 2+ on the right side, and  "2+ on the left side.        Posterior tibial pulses are 2+ on the right side, and 2+ on the left side.   Pulmonary/Chest: Effort normal and breath sounds normal. No respiratory distress. He has no wheezes. He has no rales. He exhibits no tenderness.   Abdominal: Soft. Bowel sounds are normal. He exhibits no distension. There is no tenderness.   Musculoskeletal: Normal range of motion. He exhibits edema (1+ bilateral).   Neurological: He is alert and oriented to person, place, and time. No cranial nerve deficit.   Left sided motor weakness 3/5   Skin: Skin is warm and dry. No rash noted. He is not diaphoretic. No erythema.   Chronic venous stasis changes.   Psychiatric: He has a normal mood and affect. His behavior is normal.   Nursing note and vitals reviewed.    Vitals:    02/13/20 0903 02/13/20 0907   BP: 138/70 (!) 144/64   BP Location: Right arm Left arm   Patient Position: Sitting Sitting   BP Method: Large (Manual) Large (Manual)   Pulse: 96    SpO2: 96%    Weight: 98.7 kg (217 lb 9.5 oz)    Height: 5' 8.5" (1.74 m)      Lab Results   Component Value Date    CHOL 149 06/25/2019    CHOL 194 01/30/2018    CHOL 145 03/09/2016     Lab Results   Component Value Date    HDL 31 (L) 06/25/2019    HDL 41 01/30/2018    HDL 38 (L) 03/09/2016     Lab Results   Component Value Date    LDLCALC 102.6 06/25/2019    LDLCALC 135.8 01/30/2018    LDLCALC 86.6 03/09/2016     Lab Results   Component Value Date    TRIG 77 06/25/2019    TRIG 86 01/30/2018    TRIG 102 03/09/2016     Lab Results   Component Value Date    CHOLHDL 20.8 06/25/2019    CHOLHDL 21.1 01/30/2018    CHOLHDL 26.2 03/09/2016       Chemistry        Component Value Date/Time     01/12/2020 0446    K 3.5 01/12/2020 0446     01/12/2020 0446    CO2 24 01/12/2020 0446    BUN 15 01/12/2020 0446    CREATININE 0.9 01/12/2020 0446    GLU 99 01/12/2020 0446        Component Value Date/Time    CALCIUM 8.4 (L) 01/12/2020 0446    ALKPHOS 67 01/11/2020 1435    AST " 18 01/11/2020 1435    ALT 12 01/11/2020 1435    BILITOT 0.6 01/11/2020 1435    ESTGFRAFRICA >60 01/12/2020 0446    EGFRNONAA >60 01/12/2020 0446          Lab Results   Component Value Date    TSH 0.473 06/09/2015     Lab Results   Component Value Date    INR 1.0 01/12/2020    INR 1.0 01/11/2020    INR 1.0 03/26/2016     Lab Results   Component Value Date    WBC 3.40 (L) 01/12/2020    HGB 11.9 (L) 01/12/2020    HCT 35.7 (L) 01/12/2020    MCV 94 01/12/2020     01/12/2020     BNP  @LABRCNTIP(BNP,BNPTRIAGEBLO)@  CrCl cannot be calculated (Patient's most recent lab result is older than the maximum 7 days allowed.).  Assessment:     1. Permanent atrial fibrillation    2. RAQUEL (obstructive sleep apnea)    3. Mixed restrictive and obstructive lung disease    4. Pulmonary hypertension due to chronic obstructive pulmonary disease    5. Morbid obesity    6. Carotid artery disease, unspecified laterality, unspecified type    7. Chronic pulmonary heart disease    8. Atherosclerosis of aorta    9. SOB (shortness of breath)    10. History of cerebrovascular accident (CVA) with residual deficit    11. Alcohol abuse, in remission    12. Hemiplegia and hemiparesis following cerebral infarction affecting left non-dominant side    13. Venous stasis dermatitis of both lower extremities    14. Elevated troponin    status quo . He has non compliance with meds diet and fluid pills. He was counseled with his daughter who is trying to help him being compliant.   he needs top continue current medical therapy leg elevation low salt diet to help resolve his chronic leg edema.     Plan:   As per above   Continue same meds  Diuretics intake compliance   Leg elevation   Low salt diet   F/u prn.

## 2020-02-14 DIAGNOSIS — I48.91 ATRIAL FIBRILLATION: ICD-10-CM

## 2020-02-14 RX ORDER — DILTIAZEM HYDROCHLORIDE 120 MG/1
120 CAPSULE, COATED, EXTENDED RELEASE ORAL DAILY
Qty: 90 CAPSULE | Refills: 0 | Status: SHIPPED | OUTPATIENT
Start: 2020-02-14

## 2020-02-26 ENCOUNTER — TELEPHONE (OUTPATIENT)
Dept: FAMILY MEDICINE | Facility: CLINIC | Age: 80
End: 2020-02-26

## 2020-02-26 NOTE — TELEPHONE ENCOUNTER
----- Message from Aravind Lynne sent at 2/26/2020 11:55 AM CST -----  Contact: Daughter Kenya  Would like one of the nurses to call her about getting Mr Steiner a Motorized Wheelchair.     Thank you,  Aravind

## 2020-02-26 NOTE — TELEPHONE ENCOUNTER
Notified Kenya that patient will have to go for an evaluation for a motorized wheelchair. Verbalized understanding and will call our office back once she checks with his physical therapist at home.

## 2020-03-04 ENCOUNTER — TELEPHONE (OUTPATIENT)
Dept: FAMILY MEDICINE | Facility: CLINIC | Age: 80
End: 2020-03-04

## 2020-03-04 DIAGNOSIS — R53.81 DEBILITY: ICD-10-CM

## 2020-03-04 DIAGNOSIS — E66.01 MORBID OBESITY: Chronic | ICD-10-CM

## 2020-03-04 DIAGNOSIS — I51.89 LEFT VENTRICULAR DIASTOLIC DYSFUNCTION WITH PRESERVED SYSTOLIC FUNCTION: ICD-10-CM

## 2020-03-04 DIAGNOSIS — I69.354 HEMIPLEGIA AND HEMIPARESIS FOLLOWING CEREBRAL INFARCTION AFFECTING LEFT NON-DOMINANT SIDE: ICD-10-CM

## 2020-03-04 DIAGNOSIS — I27.9 CHRONIC PULMONARY HEART DISEASE: ICD-10-CM

## 2020-03-04 DIAGNOSIS — R60.0 PEDAL EDEMA: Primary | ICD-10-CM

## 2020-03-04 DIAGNOSIS — I87.2 VENOUS STASIS DERMATITIS OF BOTH LOWER EXTREMITIES: ICD-10-CM

## 2020-03-04 NOTE — TELEPHONE ENCOUNTER
----- Message from Sruthi Kerr LPN sent at 3/3/2020  4:24 PM CST -----  Contact: [facility], [place], [phone]      ----- Message -----  From: Jayne Bradley  Sent: 3/3/2020  11:08 AM CST  To: [facility] Staff    The prescription must be sent to [facility] for the power wheel chair.  Please fax to [phone]

## 2020-03-18 ENCOUNTER — TELEPHONE (OUTPATIENT)
Dept: FAMILY MEDICINE | Facility: CLINIC | Age: 80
End: 2020-03-18

## 2020-03-18 NOTE — TELEPHONE ENCOUNTER
----- Message from Kina Onofre sent at 3/18/2020  2:44 PM CDT -----  Contact: [employer]   Caller needs call back to get status of chat notes [phone]

## 2020-03-21 ENCOUNTER — HOSPITAL ENCOUNTER (EMERGENCY)
Facility: HOSPITAL | Age: 80
Discharge: HOME | End: 2020-03-21
Attending: EMERGENCY MEDICINE
Payer: MEDICARE

## 2020-03-21 VITALS
HEART RATE: 105 BPM | WEIGHT: 215.31 LBS | TEMPERATURE: 98 F | OXYGEN SATURATION: 98 % | RESPIRATION RATE: 20 BRPM | HEIGHT: 69 IN | SYSTOLIC BLOOD PRESSURE: 120 MMHG | BODY MASS INDEX: 31.89 KG/M2 | DIASTOLIC BLOOD PRESSURE: 80 MMHG

## 2020-03-21 DIAGNOSIS — M54.6 ACUTE RIGHT-SIDED THORACIC BACK PAIN: ICD-10-CM

## 2020-03-21 DIAGNOSIS — S29.012A STRAIN OF THORACIC BACK REGION: Primary | ICD-10-CM

## 2020-03-21 PROCEDURE — 96372 THER/PROPH/DIAG INJ SC/IM: CPT | Mod: HCNC

## 2020-03-21 PROCEDURE — 63600175 PHARM REV CODE 636 W HCPCS: Mod: HCNC | Performed by: EMERGENCY MEDICINE

## 2020-03-21 PROCEDURE — 99284 EMERGENCY DEPT VISIT MOD MDM: CPT | Mod: 25,HCNC

## 2020-03-21 PROCEDURE — 25000003 PHARM REV CODE 250: Mod: HCNC | Performed by: EMERGENCY MEDICINE

## 2020-03-21 RX ORDER — KETOROLAC TROMETHAMINE 30 MG/ML
30 INJECTION, SOLUTION INTRAMUSCULAR; INTRAVENOUS
Status: COMPLETED | OUTPATIENT
Start: 2020-03-21 | End: 2020-03-21

## 2020-03-21 RX ORDER — ACETAMINOPHEN 500 MG
1000 TABLET ORAL
Status: COMPLETED | OUTPATIENT
Start: 2020-03-21 | End: 2020-03-21

## 2020-03-21 RX ORDER — METHOCARBAMOL 500 MG/1
1000 TABLET, FILM COATED ORAL 3 TIMES DAILY PRN
Qty: 30 TABLET | Refills: 0 | Status: SHIPPED | OUTPATIENT
Start: 2020-03-21 | End: 2020-03-26

## 2020-03-21 RX ORDER — METHOCARBAMOL 500 MG/1
1000 TABLET, FILM COATED ORAL
Status: COMPLETED | OUTPATIENT
Start: 2020-03-21 | End: 2020-03-21

## 2020-03-21 RX ORDER — METHOCARBAMOL 500 MG/1
1000 TABLET, FILM COATED ORAL 3 TIMES DAILY
Qty: 30 TABLET | Refills: 0 | Status: SHIPPED | OUTPATIENT
Start: 2020-03-21 | End: 2020-03-26

## 2020-03-21 RX ADMIN — ACETAMINOPHEN 1000 MG: 500 TABLET ORAL at 02:03

## 2020-03-21 RX ADMIN — KETOROLAC TROMETHAMINE 30 MG: 30 INJECTION, SOLUTION INTRAMUSCULAR; INTRAVENOUS at 02:03

## 2020-03-21 RX ADMIN — METHOCARBAMOL TABLETS 1000 MG: 500 TABLET, COATED ORAL at 02:03

## 2020-03-21 NOTE — ED PROVIDER NOTES
"SCRIBE #1 NOTE: I, Rachelle Tenorio, am scribing for, and in the presence of, Jasbir Hollins MD. I have scribed the entire note.       History     Chief Complaint   Patient presents with    Back Pain     c/o mid back pain x 2 weeks, denies trauma     Review of patient's allergies indicates:   Allergen Reactions    No known allergies          History of Present Illness     HPI    3/21/2020, 2:33 PM  History obtained from the patient      History of Present Illness: Jatin Kohler is a 80 y.o. male patient with a PMHx of Afib, CHF, HLD, HTN, and stroke (residual L sided weakness) who presents to the Emergency Department for evaluation of R middle back pain which onset gradually 2 weeks ago. Symptoms are intermittent and moderate in severity. Describes symptoms as a "strong pain". Pain is exacerbated with any movement. No mitigating factors reported. No associated sxs. Patient denies any injury/trauma, saddle anesthesia, bowel/bladder incontinence, fever, chills, acute extremity weakness/numbness, neck pain, and all other sxs at this time. No prior Tx. No further complaints or concerns at this time.       Arrival mode: [facility]    PCP: Addi Saldaña MD        Past Medical History:  Past Medical History:   Diagnosis Date    *Atrial fibrillation     Anemia due to blood loss, chronic 10/15/2013    Anxiety     Bilateral carotid artery disease 6/2/2016    CHF (congestive heart failure)     Depression     Hyperlipidemia     Hypertension     Prostate hypertrophy     Sleep apnea 9/12/2013    Stroke     Venous insufficiency 9/12/2013       Past Surgical History:  Past Surgical History:   Procedure Laterality Date    APPENDECTOMY      CATARACT EXTRACTION, BILATERAL  08/2013    CHOLECYSTECTOMY      COLON SURGERY      diverticulitis    EYE SURGERY  08/2013    cataract    HEMORRHOID SURGERY      JOINT REPLACEMENT      left hip    WOUND DEBRIDEMENT           Family History:  Family History   Problem Relation Age of " Onset    Heart disease Mother     Diabetes Mother     Diabetes Sister     Hypertension Brother     Diabetes Sister     Diabetes Sister        Social History:  Social History     Tobacco Use    Smoking status: Former Smoker     Packs/day: 0.50     Years: 20.00     Pack years: 10.00     Types: Cigarettes     Last attempt to quit: 1998     Years since quittin.2    Smokeless tobacco: Never Used   Substance and Sexual Activity    Alcohol use: Not Currently     Comment: social use of alcohol    Drug use: No    Sexual activity: Not Currently     Birth control/protection: None        Review of Systems     Review of Systems   Constitutional: Negative for activity change, chills, diaphoresis and fever.   HENT: Negative for congestion, rhinorrhea, sneezing, sore throat and trouble swallowing.    Eyes: Negative for pain.   Respiratory: Negative for cough, chest tightness, shortness of breath, wheezing and stridor.    Cardiovascular: Negative for chest pain, palpitations and leg swelling.   Gastrointestinal: Negative for abdominal distention, abdominal pain, constipation, diarrhea, nausea and vomiting.   Genitourinary: Negative for difficulty urinating, dysuria, frequency and urgency.   Musculoskeletal: Positive for back pain (R middle). Negative for arthralgias, myalgias, neck pain and neck stiffness.        (-) back injury/trauma   Skin: Negative for pallor, rash and wound.   Neurological: Negative for dizziness, syncope, weakness, light-headedness, numbness and headaches.        (-) saddle anesthesia  (-) incontinence   Hematological: Does not bruise/bleed easily.   All other systems reviewed and are negative.     Physical Exam     Initial Vitals [20 1413]   BP Pulse Resp Temp SpO2   (!) 155/94 105 18 97.8 °F (36.6 °C) 97 %      MAP       --          Physical Exam  Nursing Notes and Vital Signs Reviewed.  Constitutional: Patient is in no acute distress. Well-developed and well-nourished.  Head:  "Atraumatic. Normocephalic.  Eyes: PERRL. EOM intact. Conjunctivae are not pale. No scleral icterus.  ENT: Mucous membranes are moist. Oropharynx is clear and symmetric.    Neck: Supple. Full ROM. No lymphadenopathy.  Cardiovascular: Regular rate. Regular rhythm. No murmurs, rubs, or gallops. Distal pulses are 2+ and symmetric.  Pulmonary/Chest: No respiratory distress. Clear to auscultation bilaterally. No wheezing or rales.  Abdominal: Soft and non-distended.  There is no tenderness.  No rebound, guarding, or rigidity. Good bowel sounds.  Genitourinary: No CVA tenderness  Musculoskeletal: Moves all extremities. No obvious deformities. No edema. No calf tenderness.  Back: R lower thoracic paraspinal muscle tenderness and spasm. No midline bony tenderness, deformities, or step-offs of the T-spine or L-spine. Skin appears normal without abrasions or bruising. No erythema, induration, or fluctuance.  Skin: Warm and dry.  Neurological:  Alert, awake, and appropriate.  Normal speech.  No acute focal neurological deficits are appreciated.  Psychiatric: Normal affect. Good eye contact. Appropriate in content.     ED Course   Procedures  ED Vital Signs:  Vitals:    03/21/20 1413 03/21/20 1416 03/21/20 1616   BP: (!) 155/94  120/80   Pulse: 105     Resp: 18  20   Temp: 97.8 °F (36.6 °C)  97.8 °F (36.6 °C)   TempSrc: Oral  Oral   SpO2: 97%  98%   Weight:  97.7 kg (215 lb 5 oz)    Height: 5' 8.5" (1.74 m)                The Emergency Provider reviewed the vital signs and test results, which are outlined above.     ED Discussion     3:59 PM: Reassessed pt at this time. Discussed with pt all pertinent ED information and results. Discussed pt dx and plan of tx. Gave pt all f/u and return to the ED instructions. All questions and concerns were addressed at this time. Pt expresses understanding of information and instructions, and is comfortable with plan to discharge. Pt is stable for discharge.    I discussed with patient and/or " family/caretaker that evaluation in the ED does not suggest any emergent or life threatening medical conditions requiring immediate intervention beyond what was provided in the ED, and I believe patient is safe for discharge.  Regardless, an unremarkable evaluation in the ED does not preclude the development or presence of a serious of life threatening condition. As such, patient was instructed to return immediately for any worsening or change in current symptoms.               ED Medication(s):  Medications   methocarbamoL tablet 1,000 mg (1,000 mg Oral Given 3/21/20 1451)   acetaminophen tablet 1,000 mg (1,000 mg Oral Given 3/21/20 1452)   ketorolac injection 30 mg (30 mg Intramuscular Given 3/21/20 1453)       Discharge Medication List as of 3/21/2020  2:34 PM      START taking these medications    Details   methocarbamoL (ROBAXIN) 500 MG Tab Take 2 tablets (1,000 mg total) by mouth 3 (three) times daily as needed (muscle spasms)., Starting Sat 3/21/2020, Until Thu 3/26/2020, Normal             Follow-up Information     Addi Saldaña MD. Schedule an appointment as soon as possible for a visit in 1 week.    Specialty:  Family Medicine  Why:  For re-evaluation and further treatment  Contact information:  [address] [address] [address] [phone]             [facility]. Go today.    Specialty:  Emergency Medicine  Why:  If symptoms worsen, For re-evaluation and further treatment, As needed  Contact information:  [address] [facility] [address] [phone]           Tito De Luna MD. Schedule an appointment as soon as possible for a visit in 2 days.    Specialty:  Neurosurgery  Why:  For re-evaluation and further treatment  Contact information:  [address] [facility] [address] [phone]                       Scribe Attestation:   Scribe #1: I performed the above scribed service and the documentation accurately describes the  services I performed. I attest to the accuracy of the note.     Attending:   Physician Attestation Statement for Scribe #1: I, Jasbir Hollins MD, personally performed the services described in this documentation, as scribed by Rachelle Tenorio, in my presence, and it is both accurate and complete.           Clinical Impression       ICD-10-CM ICD-9-CM   1. Strain of thoracic back region S29.012A 847.1   2. Acute right-sided thoracic back pain M54.6 724.1       Disposition:   Disposition: Discharged  Condition: Stable         Jasbir Hollins MD  03/21/20 7223

## 2020-03-21 NOTE — ED NOTES
Spoke to patient's daughter and she informed me that he has fallen Thursday and this morning. She was able to give me his updated medication list, allergies and medical hx. She also states that he lives alone.

## 2020-03-25 ENCOUNTER — TELEPHONE (OUTPATIENT)
Dept: FAMILY MEDICINE | Facility: CLINIC | Age: 80
End: 2020-03-25

## 2020-03-25 NOTE — TELEPHONE ENCOUNTER
----- Message from Urszula Teran sent at 3/25/2020  8:15 AM CDT -----  Contact: becky corley  duplicate message regarding chart notes being needed, sent 3/10. please respond asap..[phone]

## 2020-05-30 ENCOUNTER — HOSPITAL ENCOUNTER (OUTPATIENT)
Facility: HOSPITAL | Age: 80
Discharge: NURSING, REHAB OR LONG-TERM CARE | End: 2020-06-03
Attending: EMERGENCY MEDICINE | Admitting: FAMILY MEDICINE
Payer: MEDICARE

## 2020-05-30 DIAGNOSIS — I87.2 VENOUS STASIS DERMATITIS OF BOTH LOWER EXTREMITIES: ICD-10-CM

## 2020-05-30 DIAGNOSIS — E87.20 LACTIC ACIDOSIS: ICD-10-CM

## 2020-05-30 DIAGNOSIS — R00.0 TACHYCARDIA: ICD-10-CM

## 2020-05-30 DIAGNOSIS — M79.89 LEFT LEG SWELLING: ICD-10-CM

## 2020-05-30 DIAGNOSIS — E66.01 MORBID OBESITY: ICD-10-CM

## 2020-05-30 DIAGNOSIS — L03.116 CELLULITIS OF BOTH LOWER EXTREMITIES: ICD-10-CM

## 2020-05-30 DIAGNOSIS — Z91.199 NONCOMPLIANCE: ICD-10-CM

## 2020-05-30 DIAGNOSIS — I82.412 ACUTE DEEP VEIN THROMBOSIS (DVT) OF FEMORAL VEIN OF LEFT LOWER EXTREMITY: Primary | ICD-10-CM

## 2020-05-30 DIAGNOSIS — I48.20 CHRONIC ATRIAL FIBRILLATION: ICD-10-CM

## 2020-05-30 DIAGNOSIS — Z86.69 HISTORY OF SLEEP APNEA: ICD-10-CM

## 2020-05-30 DIAGNOSIS — L03.115 CELLULITIS OF BOTH LOWER EXTREMITIES: ICD-10-CM

## 2020-05-30 DIAGNOSIS — I48.91 ATRIAL FIBRILLATION: ICD-10-CM

## 2020-05-30 PROBLEM — I50.31 ACUTE DIASTOLIC HEART FAILURE: Status: ACTIVE | Noted: 2020-05-30

## 2020-05-30 PROBLEM — I50.32 CHRONIC DIASTOLIC HEART FAILURE: Status: ACTIVE | Noted: 2020-05-30

## 2020-05-30 PROBLEM — L89.159 SACRAL DECUBITUS ULCER: Status: ACTIVE | Noted: 2020-05-30

## 2020-05-30 LAB
ALBUMIN SERPL BCP-MCNC: 2.3 G/DL (ref 3.5–5.2)
ALP SERPL-CCNC: 81 U/L (ref 55–135)
ALT SERPL W/O P-5'-P-CCNC: 14 U/L (ref 10–44)
ANION GAP SERPL CALC-SCNC: 10 MMOL/L (ref 8–16)
APTT BLDCRRT: 23.6 SEC (ref 21–32)
AST SERPL-CCNC: 16 U/L (ref 10–40)
BASOPHILS # BLD AUTO: 0 K/UL (ref 0–0.2)
BASOPHILS NFR BLD: 0 % (ref 0–1.9)
BILIRUB SERPL-MCNC: 0.3 MG/DL (ref 0.1–1)
BNP SERPL-MCNC: 123 PG/ML (ref 0–99)
BUN SERPL-MCNC: 18 MG/DL (ref 8–23)
CALCIUM SERPL-MCNC: 8.1 MG/DL (ref 8.7–10.5)
CHLORIDE SERPL-SCNC: 102 MMOL/L (ref 95–110)
CO2 SERPL-SCNC: 24 MMOL/L (ref 23–29)
CREAT SERPL-MCNC: 0.9 MG/DL (ref 0.5–1.4)
DIFFERENTIAL METHOD: ABNORMAL
EOSINOPHIL # BLD AUTO: 0.1 K/UL (ref 0–0.5)
EOSINOPHIL NFR BLD: 2.9 % (ref 0–8)
ERYTHROCYTE [DISTWIDTH] IN BLOOD BY AUTOMATED COUNT: 17.5 % (ref 11.5–14.5)
EST. GFR  (AFRICAN AMERICAN): >60 ML/MIN/1.73 M^2
EST. GFR  (NON AFRICAN AMERICAN): >60 ML/MIN/1.73 M^2
GLUCOSE SERPL-MCNC: 148 MG/DL (ref 70–110)
HCT VFR BLD AUTO: 26.1 % (ref 40–54)
HGB BLD-MCNC: 8.2 G/DL (ref 14–18)
IMM GRANULOCYTES # BLD AUTO: 0.05 K/UL (ref 0–0.04)
IMM GRANULOCYTES NFR BLD AUTO: 1.3 % (ref 0–0.5)
INR PPP: 1 (ref 0.8–1.2)
LACTATE SERPL-SCNC: 2.5 MMOL/L (ref 0.5–2.2)
LACTATE SERPL-SCNC: 3.1 MMOL/L (ref 0.5–2.2)
LYMPHOCYTES # BLD AUTO: 1.1 K/UL (ref 1–4.8)
LYMPHOCYTES NFR BLD: 29.9 % (ref 18–48)
MCH RBC QN AUTO: 29.9 PG (ref 27–31)
MCHC RBC AUTO-ENTMCNC: 31.4 G/DL (ref 32–36)
MCV RBC AUTO: 95 FL (ref 82–98)
MONOCYTES # BLD AUTO: 0.3 K/UL (ref 0.3–1)
MONOCYTES NFR BLD: 6.9 % (ref 4–15)
NEUTROPHILS # BLD AUTO: 2.2 K/UL (ref 1.8–7.7)
NEUTROPHILS NFR BLD: 59 % (ref 38–73)
NRBC BLD-RTO: 0 /100 WBC
PLATELET # BLD AUTO: 216 K/UL (ref 150–350)
PMV BLD AUTO: 9.3 FL (ref 9.2–12.9)
POTASSIUM SERPL-SCNC: 3.5 MMOL/L (ref 3.5–5.1)
PROCALCITONIN SERPL IA-MCNC: 0.04 NG/ML
PROT SERPL-MCNC: 10.7 G/DL (ref 6–8.4)
PROTHROMBIN TIME: 10.9 SEC (ref 9–12.5)
RBC # BLD AUTO: 2.74 M/UL (ref 4.6–6.2)
SARS-COV-2 RDRP RESP QL NAA+PROBE: NEGATIVE
SODIUM SERPL-SCNC: 136 MMOL/L (ref 136–145)
TROPONIN I SERPL DL<=0.01 NG/ML-MCNC: 0.01 NG/ML (ref 0–0.03)
WBC # BLD AUTO: 3.75 K/UL (ref 3.9–12.7)

## 2020-05-30 PROCEDURE — 93005 ELECTROCARDIOGRAM TRACING: CPT | Mod: HCNC

## 2020-05-30 PROCEDURE — 96375 TX/PRO/DX INJ NEW DRUG ADDON: CPT | Mod: HCNC

## 2020-05-30 PROCEDURE — 25000003 PHARM REV CODE 250: Mod: HCNC | Performed by: EMERGENCY MEDICINE

## 2020-05-30 PROCEDURE — 84145 PROCALCITONIN (PCT): CPT | Mod: HCNC

## 2020-05-30 PROCEDURE — 83880 ASSAY OF NATRIURETIC PEPTIDE: CPT | Mod: HCNC

## 2020-05-30 PROCEDURE — 94640 AIRWAY INHALATION TREATMENT: CPT | Mod: HCNC

## 2020-05-30 PROCEDURE — 96375 TX/PRO/DX INJ NEW DRUG ADDON: CPT

## 2020-05-30 PROCEDURE — 84484 ASSAY OF TROPONIN QUANT: CPT | Mod: HCNC

## 2020-05-30 PROCEDURE — 99291 CRITICAL CARE FIRST HOUR: CPT | Mod: 25,HCNC

## 2020-05-30 PROCEDURE — 96365 THER/PROPH/DIAG IV INF INIT: CPT | Mod: 59,HCNC

## 2020-05-30 PROCEDURE — U0002 COVID-19 LAB TEST NON-CDC: HCPCS | Mod: HCNC

## 2020-05-30 PROCEDURE — 85730 THROMBOPLASTIN TIME PARTIAL: CPT | Mod: 91,HCNC

## 2020-05-30 PROCEDURE — 25000242 PHARM REV CODE 250 ALT 637 W/ HCPCS: Mod: HCNC | Performed by: NURSE PRACTITIONER

## 2020-05-30 PROCEDURE — 93010 EKG 12-LEAD: ICD-10-PCS | Mod: HCNC,,, | Performed by: INTERNAL MEDICINE

## 2020-05-30 PROCEDURE — 63600175 PHARM REV CODE 636 W HCPCS: Mod: HCNC | Performed by: EMERGENCY MEDICINE

## 2020-05-30 PROCEDURE — 36415 COLL VENOUS BLD VENIPUNCTURE: CPT | Mod: HCNC

## 2020-05-30 PROCEDURE — 83605 ASSAY OF LACTIC ACID: CPT | Mod: 91,HCNC

## 2020-05-30 PROCEDURE — 96365 THER/PROPH/DIAG IV INF INIT: CPT

## 2020-05-30 PROCEDURE — 87040 BLOOD CULTURE FOR BACTERIA: CPT | Mod: 59,HCNC

## 2020-05-30 PROCEDURE — G0378 HOSPITAL OBSERVATION PER HR: HCPCS | Mod: HCNC

## 2020-05-30 PROCEDURE — 85025 COMPLETE CBC W/AUTO DIFF WBC: CPT | Mod: HCNC

## 2020-05-30 PROCEDURE — 83605 ASSAY OF LACTIC ACID: CPT | Mod: HCNC

## 2020-05-30 PROCEDURE — 85610 PROTHROMBIN TIME: CPT | Mod: HCNC

## 2020-05-30 PROCEDURE — 25000003 PHARM REV CODE 250: Mod: HCNC | Performed by: NURSE PRACTITIONER

## 2020-05-30 PROCEDURE — G0378 HOSPITAL OBSERVATION PER HR: HCPCS | Mod: HCNC,CS

## 2020-05-30 PROCEDURE — 93010 ELECTROCARDIOGRAM REPORT: CPT | Mod: HCNC,,, | Performed by: INTERNAL MEDICINE

## 2020-05-30 PROCEDURE — 80053 COMPREHEN METABOLIC PANEL: CPT | Mod: HCNC

## 2020-05-30 PROCEDURE — 85730 THROMBOPLASTIN TIME PARTIAL: CPT | Mod: HCNC

## 2020-05-30 RX ORDER — FUROSEMIDE 40 MG/1
40 TABLET ORAL DAILY
Status: DISCONTINUED | OUTPATIENT
Start: 2020-05-31 | End: 2020-06-03 | Stop reason: HOSPADM

## 2020-05-30 RX ORDER — DILTIAZEM HYDROCHLORIDE 120 MG/1
120 CAPSULE, COATED, EXTENDED RELEASE ORAL DAILY
Status: DISCONTINUED | OUTPATIENT
Start: 2020-05-31 | End: 2020-06-03 | Stop reason: HOSPADM

## 2020-05-30 RX ORDER — ONDANSETRON 2 MG/ML
4 INJECTION INTRAMUSCULAR; INTRAVENOUS EVERY 8 HOURS PRN
Status: DISCONTINUED | OUTPATIENT
Start: 2020-05-30 | End: 2020-06-03 | Stop reason: HOSPADM

## 2020-05-30 RX ORDER — MAG HYDROX/ALUMINUM HYD/SIMETH 200-200-20
30 SUSPENSION, ORAL (FINAL DOSE FORM) ORAL EVERY 6 HOURS PRN
Status: DISCONTINUED | OUTPATIENT
Start: 2020-05-30 | End: 2020-06-03 | Stop reason: HOSPADM

## 2020-05-30 RX ORDER — HEPARIN SODIUM,PORCINE/D5W 25000/250
18 INTRAVENOUS SOLUTION INTRAVENOUS CONTINUOUS
Status: DISCONTINUED | OUTPATIENT
Start: 2020-05-30 | End: 2020-05-31

## 2020-05-30 RX ORDER — ACETAMINOPHEN 325 MG/1
650 TABLET ORAL EVERY 6 HOURS PRN
Status: DISCONTINUED | OUTPATIENT
Start: 2020-05-30 | End: 2020-06-03 | Stop reason: HOSPADM

## 2020-05-30 RX ORDER — HYDRALAZINE HYDROCHLORIDE 20 MG/ML
10 INJECTION INTRAMUSCULAR; INTRAVENOUS EVERY 6 HOURS PRN
Status: DISCONTINUED | OUTPATIENT
Start: 2020-05-30 | End: 2020-06-03 | Stop reason: HOSPADM

## 2020-05-30 RX ORDER — OXYBUTYNIN CHLORIDE 5 MG/1
10 TABLET, EXTENDED RELEASE ORAL DAILY
Status: DISCONTINUED | OUTPATIENT
Start: 2020-05-31 | End: 2020-06-03 | Stop reason: HOSPADM

## 2020-05-30 RX ORDER — IPRATROPIUM BROMIDE AND ALBUTEROL SULFATE 2.5; .5 MG/3ML; MG/3ML
3 SOLUTION RESPIRATORY (INHALATION) EVERY 12 HOURS
Status: DISCONTINUED | OUTPATIENT
Start: 2020-05-30 | End: 2020-06-03 | Stop reason: HOSPADM

## 2020-05-30 RX ORDER — METOPROLOL TARTRATE 1 MG/ML
5 INJECTION, SOLUTION INTRAVENOUS
Status: COMPLETED | OUTPATIENT
Start: 2020-05-30 | End: 2020-05-30

## 2020-05-30 RX ORDER — ASPIRIN 81 MG/1
81 TABLET ORAL DAILY
Status: DISCONTINUED | OUTPATIENT
Start: 2020-05-31 | End: 2020-06-03 | Stop reason: HOSPADM

## 2020-05-30 RX ORDER — PANTOPRAZOLE SODIUM 40 MG/1
40 TABLET, DELAYED RELEASE ORAL DAILY
Status: DISCONTINUED | OUTPATIENT
Start: 2020-05-30 | End: 2020-06-03 | Stop reason: HOSPADM

## 2020-05-30 RX ADMIN — IPRATROPIUM BROMIDE AND ALBUTEROL SULFATE 3 ML: .5; 3 SOLUTION RESPIRATORY (INHALATION) at 07:05

## 2020-05-30 RX ADMIN — METOPROLOL TARTRATE 5 MG: 5 INJECTION INTRAVENOUS at 03:05

## 2020-05-30 RX ADMIN — PIPERACILLIN AND TAZOBACTAM 4.5 G: 4; .5 INJECTION, POWDER, LYOPHILIZED, FOR SOLUTION INTRAVENOUS; PARENTERAL at 03:05

## 2020-05-30 RX ADMIN — HEPARIN SODIUM 18 UNITS/KG/HR: 10000 INJECTION, SOLUTION INTRAVENOUS at 05:05

## 2020-05-30 RX ADMIN — PANTOPRAZOLE SODIUM 40 MG: 40 TABLET, DELAYED RELEASE ORAL at 09:05

## 2020-05-30 RX ADMIN — VANCOMYCIN HYDROCHLORIDE 2250 MG: 100 INJECTION, POWDER, LYOPHILIZED, FOR SOLUTION INTRAVENOUS at 04:05

## 2020-05-30 NOTE — SUBJECTIVE & OBJECTIVE
Past Medical History:   Diagnosis Date    *Atrial fibrillation     Anemia due to blood loss, chronic 10/15/2013    Anxiety     Bilateral carotid artery disease 2016    CHF (congestive heart failure)     Depression     Hyperlipidemia     Hypertension     Prostate hypertrophy     Sleep apnea 2013    Stroke     Venous insufficiency 2013       Past Surgical History:   Procedure Laterality Date    APPENDECTOMY      CATARACT EXTRACTION, BILATERAL  2013    CHOLECYSTECTOMY      COLON SURGERY      diverticulitis    EYE SURGERY  2013    cataract    HEMORRHOID SURGERY      JOINT REPLACEMENT      left hip    WOUND DEBRIDEMENT         Review of patient's allergies indicates:   Allergen Reactions    No known allergies        No current facility-administered medications on file prior to encounter.      Current Outpatient Medications on File Prior to Encounter   Medication Sig    aspirin (ECOTRIN) 81 MG EC tablet Take 1 tablet (81 mg total) by mouth once daily.    diltiaZEM (CARDIZEM CD) 120 MG Cp24 Take 1 capsule (120 mg total) by mouth once daily.    furosemide (LASIX) 40 MG tablet TAKE 1 TABLET(40 MG) BY MOUTH DAILY AS NEEDED    naproxen sodium (ANAPROX) 220 MG tablet Take 220 mg by mouth 2 (two) times daily as needed.    oxybutynin (DITROPAN-XL) 10 MG 24 hr tablet Take 1 tablet (10 mg total) by mouth once daily.    LORazepam (ATIVAN) 1 MG tablet Take 1 tablet (1 mg total) by mouth 2 (two) times daily as needed for Anxiety.     Family History     Problem Relation (Age of Onset)    Diabetes Mother, Sister, Sister, Sister    Heart disease Mother    Hypertension Brother        Tobacco Use    Smoking status: Former Smoker     Packs/day: 0.50     Years: 20.00     Pack years: 10.00     Types: Cigarettes     Last attempt to quit: 1998     Years since quittin.4    Smokeless tobacco: Never Used   Substance and Sexual Activity    Alcohol use: Not Currently     Comment: social use  of alcohol    Drug use: No    Sexual activity: Not Currently     Birth control/protection: None     Review of Systems   Constitutional: Positive for activity change. Negative for appetite change, chills, diaphoresis, fatigue and fever.   HENT: Negative.    Eyes: Negative for redness and visual disturbance.   Respiratory: Positive for cough and shortness of breath. Negative for wheezing.    Cardiovascular: Positive for leg swelling. Negative for chest pain and palpitations.   Gastrointestinal: Negative for abdominal pain, diarrhea, nausea and vomiting.   Genitourinary:        Incontinent of urine   Musculoskeletal:        Debility - W/C bound   Skin: Positive for color change and wound.   Neurological: Positive for tremors and weakness. Negative for dizziness, facial asymmetry, speech difficulty and light-headedness.   Psychiatric/Behavioral: Negative for confusion.     Objective:     Vital Signs (Most Recent):  Temp: 96.5 °F (35.8 °C) (05/30/20 1800)  Pulse: 109 (05/30/20 1800)  Resp: 20 (05/30/20 1800)  BP: 134/80 (05/30/20 1800)  SpO2: 95 % (05/30/20 1800) Vital Signs (24h Range):  Temp:  [96.5 °F (35.8 °C)-98 °F (36.7 °C)] 96.5 °F (35.8 °C)  Pulse:  [] 109  Resp:  [18-20] 20  SpO2:  [94 %-97 %] 95 %  BP: (122-151)/(60-85) 134/80     Weight: 101 kg (222 lb 11.2 oz)  Body mass index is 33.37 kg/m².    Physical Exam   Constitutional: He is oriented to person, place, and time. He appears well-developed and well-nourished.     Elder  male - unkempt in appearance   HENT:   Head: Normocephalic and atraumatic.   Nose: Nose normal.   Mouth/Throat: Oropharynx is clear and moist.   Eyes: Conjunctivae are normal. No scleral icterus.   Neck: Normal range of motion. Neck supple.   Cardiovascular: Normal heart sounds. An irregular rhythm present. Tachycardia present. Exam reveals no gallop and no friction rub.   No murmur heard.  Pulmonary/Chest: Effort normal and breath sounds normal.   Abdominal: Soft. Bowel  sounds are normal. He exhibits no distension. There is no tenderness. There is no guarding.   obese   Musculoskeletal: He exhibits edema. He exhibits no tenderness.   Generalized weakness  Pitting edema to BLE with left being greater than the right   Neurological: He is alert and oriented to person, place, and time.   Skin: Skin is warm and dry.   Sacral wound deferred - nurse to evaluate at bedside   Left lower leg appears slightly more erythematous with increased warm than right leg - more beefy erythema present to left leg    Right leg has erythema but appears chronic  There are chronic ulcerations with purulent drainage and various scabs   Psychiatric: He has a normal mood and affect. His behavior is normal.   Vitals reviewed.          Significant Labs:   CBC:   Recent Labs   Lab 05/30/20  1419   WBC 3.75*   HGB 8.2*   HCT 26.1*        CMP:   Recent Labs   Lab 05/30/20  1419      K 3.5      CO2 24   *   BUN 18   CREATININE 0.9   CALCIUM 8.1*   PROT 10.7*   ALBUMIN 2.3*   BILITOT 0.3   ALKPHOS 81   AST 16   ALT 14   ANIONGAP 10   EGFRNONAA >60     All pertinent labs within the past 24 hours have been reviewed.    Significant Imaging: I have reviewed all pertinent imaging results/findings within the past 24 hours.

## 2020-05-30 NOTE — CONSULTS
Pharmacokinetic Initial Assessment: IV Vancomycin    Assessment/Plan:    Initiate intravenous vancomycin with loading dose of 2250 mg once followed by a maintenance dose of vancomycin 1250 mg IV every 12 hours  Desired empiric serum trough concentration is 10 to 15 mcg/mL  Draw vancomycin trough level 30 min prior to fourth dose on 6/1/2020 at approximately 0330.   Pharmacy will continue to follow and monitor vancomycin.      Please contact pharmacy at extension [phone] with any questions regarding this assessment.     Thank you for the consult,   Nargis Thompson       Patient brief summary:  Jatin Kohler is a 80 y.o. male initiated on antimicrobial therapy with IV Vancomycin for treatment of suspected skin & soft tissue infection    Drug Allergies:   Review of patient's allergies indicates:   Allergen Reactions    No known allergies        Actual Body Weight:   101 Kg     Renal Function:   Estimated Creatinine Clearance: 76.1 mL/min (based on SCr of 0.9 mg/dL).,     Dialysis Method (if applicable):  N/A    CBC (last 72 hours):  Recent Labs   Lab Result Units 05/30/20  1419   WBC K/uL 3.75*   Hemoglobin g/dL 8.2*   Hematocrit % 26.1*   Platelets K/uL 216   Gran% % 59.0   Lymph% % 29.9   Mono% % 6.9   Eosinophil% % 2.9   Basophil% % 0.0   Differential Method  Automated       Metabolic Panel (last 72 hours):  Recent Labs   Lab Result Units 05/30/20  1419   Sodium mmol/L 136   Potassium mmol/L 3.5   Chloride mmol/L 102   CO2 mmol/L 24   Glucose mg/dL 148*   BUN, Bld mg/dL 18   Creatinine mg/dL 0.9   Albumin g/dL 2.3*   Total Bilirubin mg/dL 0.3   Alkaline Phosphatase U/L 81   AST U/L 16   ALT U/L 14       Drug levels (last 3 results):  No results for input(s): VANCOMYCINRA, VANCOMYCINPE, VANCOMYCINTR in the last 72 hours.    Microbiologic Results:  Microbiology Results (last 7 days)       Procedure Component Value Units Date/Time    Blood Culture #2 **CANNOT BE ORDERED STAT** [[id number]] Collected:  05/30/20 9472     Order Status:  Sent Specimen:  Blood from Peripheral, Antecubital, Left Updated:  05/30/20 1546    Blood Culture #1 **CANNOT BE ORDERED STAT** [[id number]] Collected:  05/30/20 1419    Order Status:  Sent Specimen:  Blood from Peripheral, Forearm, Right Updated:  05/30/20 1424

## 2020-05-30 NOTE — ED NOTES
Spoke with patient daughter, Kenya, who went over his medications with me. She reports that he is not compliant with his medications and that her and home health struggle with getting him to take his meds. She also is not sure his wounds are being cared for like they should be but he refuses treatment a lot.    [facility] is who sees him

## 2020-05-30 NOTE — ASSESSMENT & PLAN NOTE
Rate  = 100  Continue Telemetry monitoring  Resume diltiazem and ASA  Pt is not prescribed anticoagulation

## 2020-05-30 NOTE — HPI
Pt is a 81 yo male with PMHx of CHF, HTN, PVD, Sleep apnea, Anemia due to chronic blood loss who presents due to worsened left leg swelling over the last week. Pt is W/C bound and has chronic wounds to the bilateral legs and to sacrum. According to daughter, he is noncompliant with medications. With the left leg swelling, pt has a oozing wound to the right medial leg and serous oozing from bilateral legs. Pt reports SOB with exertion and occasional cough but denies chest pain, fever, chills, abdominal pain, nausea/vomiting, diarrhea and constipation. Labs note WBC 3.75, H/H 8.2/26.1, glucose 148, , troponin 0.015 and lactate 3.1. Procalcitonin is normal and COVID negative. A venous US of LLE revealed and DVT to left femoral vein and greater saphenous vein. CXR - Enlarged heart with possible pleural fluid inferiorly. Pt is placed on Observation to initiate treatment for LLE DVT with concerns for bilateral leg cellulitis and a pressure ulcer to the sacral area. CANDICE Soriano, pt's daughter.

## 2020-05-30 NOTE — ASSESSMENT & PLAN NOTE
According to daughter, pt has been on Coumadin in the past but when placed on Hospice the medication was discontinued  Will initiate heparin infusion  Will convert to oral medication - Eliquis -  prior to discharge

## 2020-05-30 NOTE — ED NOTES
Pt c/o increasing swelling, redness, soreness, and leaking areas to his legs. Pt says that a large sore appeared on the back of his right calf recently.    Patient moved to ED room 3, patient assisted onto stretcher and changed into a gown. Patient placed on cardiac monitor, continuous pulse oximetry and automatic blood pressure cuff. Bed placed in low locked position, side rails up x 2, call light is within reach of patient or family, orientation to room and explanation of wait provided to family and patient, alarms set and turned on for monitor and pulse ox, awaiting MD evaluation and orders, will continue to monitor.    Patient identifies self as Jatin Kohler      LOC: The patient is awake, alert and aware of environment with an appropriate affect, the patient is oriented x 3 and speaking appropriately.  APPEARANCE: Patient resting comfortably and in no acute distress, patient is clean and well groomed, patient's clothing is properly fastened.  SKIN: The skin is warm and dry, color consistent with ethnicity, patient has normal skin turgor and moist mucus membranes, skin intact, no breakdown or bruising noted.  MUSCULOSKELETAL: Patient moving all extremities well, no obvious swelling or deformities noted. Pt left side of body had tremors and twitches - pt reports it is associated with history of a stroke.  RESPIRATORY: Airway is open and patent, respirations are spontaneous, patient has a normal effort and rate, no accessory muscle use noted.  CARDIAC: Patient has a normal rate and rhythm, 2+ peripheral edema noted to bilateral lower extremities with redness, weeping, and sores noted. Capillary refill < 3 seconds.  ABDOMEN: Soft and non tender to palpation, no distention noted.  NEUROLOGIC: PERRL, eyes open spontaneously, behavior appropriate to situation, follows commands, facial expression symmetrical, bilateral hand grasp equal and even, purposeful motor response noted, normal sensation in all extremities  when touched with a finger.

## 2020-05-30 NOTE — ED PROVIDER NOTES
SCRIBE #1 NOTE: I, Mina Sin, am scribing for, and in the presence of, Luciana Fox MD. I have scribed the entire note.       History     Chief Complaint   Patient presents with    Cellulitis     BLE red, swollen, and sweeping. Wounds noted. Pts daughter also reports wound to buttucks     Review of patient's allergies indicates:   Allergen Reactions    No known allergies          History of Present Illness     HPI    5/30/2020, 2:13 PM  History obtained from the patient      History of Present Illness: Jatin Kohler is a 80 y.o. male patient with a history of CHF, HTN who presents to the Emergency Department for evaluation of bilateral lower leg swelling (left worse than right) which onset 2-3 weeks ago. Symptoms are constant and moderate in severity. No mitigating or exacerbating factors reported. Associated sxs include BLE redness and weeping. Patient denies any fever, chills, n/v, cp, sob, and all other sxs at this time. Prior Tx includes none. No further complaints or concerns at this time. Patient denies any h/o DM. Patient states he is not sure what meds he is currently taking but states he is compliant with his medications. Patient states he is not in wound care. Patient reports extensive family history of DM.      Arrival mode: Personal transportation      PCP: Addi Saldaña MD      Past Medical History:  Past Medical History:   Diagnosis Date    *Atrial fibrillation     Anemia due to blood loss, chronic 10/15/2013    Anxiety     Bilateral carotid artery disease 6/2/2016    CHF (congestive heart failure)     Depression     Hyperlipidemia     Hypertension     Prostate hypertrophy     Sleep apnea 9/12/2013    Stroke     Venous insufficiency 9/12/2013       Past Surgical History:  Past Surgical History:   Procedure Laterality Date    APPENDECTOMY      CATARACT EXTRACTION, BILATERAL  08/2013    CHOLECYSTECTOMY      COLON SURGERY      diverticulitis    EYE SURGERY  08/2013     cataract    HEMORRHOID SURGERY      JOINT REPLACEMENT      left hip    WOUND DEBRIDEMENT           Family History:  Family History   Problem Relation Age of Onset    Heart disease Mother     Diabetes Mother     Diabetes Sister     Hypertension Brother     Diabetes Sister     Diabetes Sister        Social History:   Social History     Tobacco Use    Smoking status: Former Smoker     Packs/day: 0.50     Years: 20.00     Pack years: 10.00     Types: Cigarettes     Last attempt to quit: 1998     Years since quittin.4    Smokeless tobacco: Never Used   Substance and Sexual Activity    Alcohol use: Not Currently     Comment: social use of alcohol    Drug use: No    Sexual activity: Not Currently     Birth control/protection: None        Review of Systems     Review of Systems   Constitutional: Negative for chills and fever.   HENT: Negative for sore throat.    Respiratory: Negative for shortness of breath.    Cardiovascular: Positive for leg swelling. Negative for chest pain.   Gastrointestinal: Negative for nausea and vomiting.   Genitourinary: Negative for dysuria.   Musculoskeletal: Negative for back pain.        + bilateral leg redness  + bilateral leg weeping   Skin: Negative for rash.   Neurological: Negative for weakness.   Hematological: Does not bruise/bleed easily.   All other systems reviewed and are negative.       Physical Exam     Initial Vitals [20 1346]   BP Pulse Resp Temp SpO2   133/83 (!) 111 20 97.8 °F (36.6 °C) (!) 94 %      MAP       --          Physical Exam  Nursing Notes and Vital Signs Reviewed.  Constitutional: Morbidly obese. Patient is in NAD. Poor historian. Disheveled and unkempt.  Head: Atraumatic. Normocephalic.  Eyes: PERRL. EOM intact. Conjunctivae are not pale. No scleral icterus.  ENT: Mucous membranes are moist. Oropharynx is clear and symmetric.    Neck: Supple. Full ROM. No lymphadenopathy.  Cardiovascular: Regular rate. Irregularly irregular rhythm. No  murmurs, rubs, or gallops. Distal pulses are 2+ and symmetric. 2+ pitting edema to BLE.  Pulmonary/Chest: No respiratory distress. Clear to auscultation bilaterally. No wheezing or rales.  Abdominal: Soft and non-distended.  There is no tenderness.  No rebound, guarding, or rigidity. Good bowel sounds.  Genitourinary: No CVA tenderness  Musculoskeletal: Moves all extremities. No obvious deformities. No calf tenderness.   Skin: Warm and dry. Chronic venus stasis dermatitis changes to bilateral lower extremities with yellow tinged fluid weeping left greater than right, left leg more swollen than right leg. Ulcer noted to medial aspect of distal right leg. Blanching erythema and increased warmth to bilateral lower extremities.  Neurological:  Alert, awake, and appropriate.  Normal speech.  No acute focal neurological deficits are appreciated.  Psychiatric: Normal affect. Good eye contact. Appropriate in content.                 ED Course   Critical Care  Date/Time: 5/30/2020 3:56 PM  Performed by: Luciana Fox MD  Authorized by: Luciana Fox MD   Direct patient critical care time: 20 minutes  Additional history critical care time: 10 minutes  Ordering / reviewing critical care time: 10 minutes  Documentation critical care time: 10 minutes  Consulting other physicians critical care time: 5 minutes  Consult with family critical care time: 5 minutes  Total critical care time (exclusive of procedural time) : 60 minutes  Critical care time was exclusive of separately billable procedures and treating other patients and teaching time.  Critical care was necessary to treat or prevent imminent or life-threatening deterioration of the following conditions: circulatory failure and sepsis (acute deep vein thrombosis, lactic acidosis, leg cellulitis).  Critical care was time spent personally by me on the following activities: development of treatment plan with patient or surrogate, blood draw for specimens,  "interpretation of cardiac output measurements, evaluation of patient's response to treatment, examination of patient, obtaining history from patient or surrogate, ordering and performing treatments and interventions, ordering and review of laboratory studies, ordering and review of radiographic studies, pulse oximetry, re-evaluation of patient's condition, review of old charts and discussions with consultants.        ED Vital Signs:  Vitals:    05/30/20 1346 05/30/20 1405 05/30/20 1415 05/30/20 1419   BP: 133/83 (!) 144/73     Pulse: (!) 111 (!) 111  105   Resp: 20 20     Temp: 97.8 °F (36.6 °C)      TempSrc: Oral      SpO2: (!) 94% 96%     Weight:   101 kg (222 lb 11.2 oz)    Height: 5' 8.5" (1.74 m)       05/30/20 1430 05/30/20 1530   BP: 122/60 (!) 151/78   Pulse: 109 97   Resp: 18 20   Temp:     TempSrc:     SpO2: 96% 97%   Weight:     Height:         Abnormal Lab Results:  Labs Reviewed   CBC W/ AUTO DIFFERENTIAL - Abnormal; Notable for the following components:       Result Value    WBC 3.75 (*)     RBC 2.74 (*)     Hemoglobin 8.2 (*)     Hematocrit 26.1 (*)     Mean Corpuscular Hemoglobin Conc 31.4 (*)     RDW 17.5 (*)     Immature Granulocytes 1.3 (*)     Immature Grans (Abs) 0.05 (*)     All other components within normal limits   COMPREHENSIVE METABOLIC PANEL - Abnormal; Notable for the following components:    Glucose 148 (*)     Calcium 8.1 (*)     Total Protein 10.7 (*)     Albumin 2.3 (*)     All other components within normal limits   B-TYPE NATRIURETIC PEPTIDE - Abnormal; Notable for the following components:     (*)     All other components within normal limits   LACTIC ACID, PLASMA - Abnormal; Notable for the following components:    Lactate (Lactic Acid) 3.1 (*)     All other components within normal limits   CULTURE, BLOOD   CULTURE, BLOOD   TROPONIN I   PROCALCITONIN   PROTIME-INR   APTT   PROTIME-INR   APTT   SARS-COV-2 RNA AMPLIFICATION, QUAL        All Lab Results:  Results for orders " placed or performed during the hospital encounter of 05/30/20   CBC auto differential   Result Value Ref Range    WBC 3.75 (L) 3.90 - 12.70 K/uL    RBC 2.74 (L) 4.60 - 6.20 M/uL    Hemoglobin 8.2 (L) 14.0 - 18.0 g/dL    Hematocrit 26.1 (L) 40.0 - 54.0 %    Mean Corpuscular Volume 95 82 - 98 fL    Mean Corpuscular Hemoglobin 29.9 27.0 - 31.0 pg    Mean Corpuscular Hemoglobin Conc 31.4 (L) 32.0 - 36.0 g/dL    RDW 17.5 (H) 11.5 - 14.5 %    Platelets 216 150 - 350 K/uL    MPV 9.3 9.2 - 12.9 fL    Immature Granulocytes 1.3 (H) 0.0 - 0.5 %    Gran # (ANC) 2.2 1.8 - 7.7 K/uL    Immature Grans (Abs) 0.05 (H) 0.00 - 0.04 K/uL    Lymph # 1.1 1.0 - 4.8 K/uL    Mono # 0.3 0.3 - 1.0 K/uL    Eos # 0.1 0.0 - 0.5 K/uL    Baso # 0.00 0.00 - 0.20 K/uL    nRBC 0 0 /100 WBC    Gran% 59.0 38.0 - 73.0 %    Lymph% 29.9 18.0 - 48.0 %    Mono% 6.9 4.0 - 15.0 %    Eosinophil% 2.9 0.0 - 8.0 %    Basophil% 0.0 0.0 - 1.9 %    Differential Method Automated    Comprehensive metabolic panel   Result Value Ref Range    Sodium 136 136 - 145 mmol/L    Potassium 3.5 3.5 - 5.1 mmol/L    Chloride 102 95 - 110 mmol/L    CO2 24 23 - 29 mmol/L    Glucose 148 (H) 70 - 110 mg/dL    BUN, Bld 18 8 - 23 mg/dL    Creatinine 0.9 0.5 - 1.4 mg/dL    Calcium 8.1 (L) 8.7 - 10.5 mg/dL    Total Protein 10.7 (H) 6.0 - 8.4 g/dL    Albumin 2.3 (L) 3.5 - 5.2 g/dL    Total Bilirubin 0.3 0.1 - 1.0 mg/dL    Alkaline Phosphatase 81 55 - 135 U/L    AST 16 10 - 40 U/L    ALT 14 10 - 44 U/L    Anion Gap 10 8 - 16 mmol/L    eGFR if African American >60 >60 mL/min/1.73 m^2    eGFR if non African American >60 >60 mL/min/1.73 m^2   Troponin I #1   Result Value Ref Range    Troponin I 0.015 0.000 - 0.026 ng/mL   B-Type natriuretic peptide (BNP)   Result Value Ref Range     (H) 0 - 99 pg/mL   Lactic acid, plasma   Result Value Ref Range    Lactate (Lactic Acid) 3.1 (H) 0.5 - 2.2 mmol/L   Procalcitonin   Result Value Ref Range    Procalcitonin 0.04 <0.25 ng/mL   Protime-INR    Result Value Ref Range    Prothrombin Time 10.9 9.0 - 12.5 sec    INR 1.0 0.8 - 1.2   APTT   Result Value Ref Range    aPTT 23.6 21.0 - 32.0 sec   COVID-19 Rapid Screening   Result Value Ref Range    SARS-CoV-2 RNA, Amplification, Qual Negative Negative         Imaging Results          US Lower Extremity Veins Left (Final result)  Result time 05/30/20 15:45:59    Final result by Brice Man Jr., MD (05/30/20 15:45:59)                 Impression:      Positive deep venous thrombosis within the left lower extremity.      Electronically signed by: Brice Man Jr., MD  Date:    05/30/2020  Time:    15:45             Narrative:    EXAMINATION:  US LOWER EXTREMITY VEINS LEFT    CLINICAL HISTORY:  Other specified soft tissue disorders    TECHNIQUE:  Duplex and color flow Doppler evaluation and graded compression of the left lower extremity veins was performed.    COMPARISON:  None    FINDINGS:  Left thigh veins: There is occlusive thrombus within the left femoral vein, greater saphenous vein.  The popliteal vein is patent.    Left calf veins: The visualized calf veins are patent.    Contralateral CFV: The contralateral (right) common femoral vein is patent and free of thrombus.    Miscellaneous: There is edema within bilateral calves soft tissues.                               X-Ray Chest AP Portable (Final result)  Result time 05/30/20 14:42:51    Final result by Brice Man Jr., MD (05/30/20 14:42:51)                 Impression:      Enlarged heart with possible pleural fluid inferiorly.      Electronically signed by: Brice Man Jr., MD  Date:    05/30/2020  Time:    14:42             Narrative:    EXAMINATION:  XR CHEST AP PORTABLE    CLINICAL HISTORY:  atrial fibrillation;    COMPARISON:  Prior radiograph from January 11, 2020.    FINDINGS:  Lungs are clear.  Possible small pleural fluid bilaterally.  The heart is enlarged with a prominent pericardial fat pad.  Calcified and tortuous aorta.  No significant  bony findings.                                 The EKG was ordered, reviewed, and independently interpreted by the ED provider.  Interpretation time: 1422  Rate: 100 BPM  Rhythm: AF with premature ventricular or aberrantly conducted complexes  Interpretation: prolonged QT. No STEMI.      The Emergency Provider reviewed the vital signs and test results, which are outlined above.     ED Discussion       3:56 PM: Discussed case with Petrona Muir NP ([facility]). Dr. Cabral agrees with current care and management of pt and accepts admission. Recommends starting heparin.  Admitting Service: [facility]  Admit to: obs / tele    Re-evaluated pt. I have discussed test results, shared treatment plan, and the need for admission with patient and family at bedside. Pt and family express understanding at this time and agree with all information. All questions answered. Pt and family have no further questions or concerns at this time. Pt is ready for admit.    4:32 PM: Discussed case with patient's daughter, Kenya Soriano, who has patient's power of . Daughter states she knows patient is noncompliant with meds and does not take good care of himself. Agrees with need for admit and plan for admission.         MDM        Medical Decision Making:   Clinical Tests:   Lab Tests: Ordered and Reviewed  Radiological Study: Ordered and Reviewed  Medical Tests: Ordered and Reviewed           ED Medication(s):  Medications   vancomycin - pharmacy to dose (has no administration in time range)   vancomycin (VANCOCIN) 2,250 mg in dextrose 5 % 500 mL IVPB (2,250 mg Intravenous New Bag 5/30/20 1607)   heparin 25,000 units in dextrose 5% (100 units/ml) IV bolus from bag INITIAL BOLUS (has no administration in time range)   heparin 25,000 units in dextrose 5% 250 mL (100 units/mL) infusion HIGH INTENSITY nomogram - [facility] (has no administration in time range)   heparin 25,000 units in dextrose 5% (100 units/ml) IV bolus from  bag - ADDITIONAL PRN BOLUS - 60 units/kg (has no administration in time range)   heparin 25,000 units in dextrose 5% (100 units/ml) IV bolus from bag - ADDITIONAL PRN BOLUS - 30 units/kg (has no administration in time range)   metoprolol injection 5 mg (5 mg Intravenous Given 5/30/20 1523)   piperacillin-tazobactam 4.5 g in dextrose 5 % 100 mL IVPB (ready to mix system) (4.5 g Intravenous New Bag 5/30/20 1527)       New Prescriptions    No medications on file               Scribe Attestation:   Scribe #1: I performed the above scribed service and the documentation accurately describes the services I performed. I attest to the accuracy of the note.     Attending:   Physician Attestation Statement for Scribe #1: I, Luciana Fox MD, personally performed the services described in this documentation, as scribed by Mina Sin, in my presence, and it is both accurate and complete.           Clinical Impression       ICD-10-CM ICD-9-CM   1. Acute deep vein thrombosis (DVT) of femoral vein of left lower extremity I82.412 453.41   2. Atrial fibrillation I48.91 427.31   3. Left leg swelling M79.89 729.81   4. Venous stasis dermatitis of both lower extremities I87.2 454.1   5. Morbid obesity E66.01 278.01   6. Chronic atrial fibrillation I48.20 427.31   7. Noncompliance Z91.19 V15.81   8. Lactic acidosis E87.2 276.2   9. History of sleep apnea Z86.69 V13.89   10. Cellulitis of both lower extremities L03.115 682.6    L03.116        Disposition:   Disposition: Placed in Observation  Condition: Fair         Luciana Fox MD  05/30/20 7282

## 2020-05-31 PROBLEM — R53.81 DEBILITATED: Status: ACTIVE | Noted: 2020-05-31

## 2020-05-31 PROBLEM — D64.9 ACUTE ON CHRONIC ANEMIA: Status: ACTIVE | Noted: 2020-05-31

## 2020-05-31 LAB
ABO + RH BLD: NORMAL
ALBUMIN SERPL BCP-MCNC: 1.9 G/DL (ref 3.5–5.2)
ALP SERPL-CCNC: 74 U/L (ref 55–135)
ALT SERPL W/O P-5'-P-CCNC: 15 U/L (ref 10–44)
ANION GAP SERPL CALC-SCNC: 4 MMOL/L (ref 8–16)
APTT BLDCRRT: 61.6 SEC (ref 21–32)
APTT BLDCRRT: 64.6 SEC (ref 21–32)
APTT BLDCRRT: 70 SEC (ref 21–32)
AST SERPL-CCNC: 19 U/L (ref 10–40)
BASOPHILS # BLD AUTO: 0 K/UL (ref 0–0.2)
BASOPHILS # BLD AUTO: 0 K/UL (ref 0–0.2)
BASOPHILS NFR BLD: 0 % (ref 0–1.9)
BASOPHILS NFR BLD: 0 % (ref 0–1.9)
BILIRUB SERPL-MCNC: 0.3 MG/DL (ref 0.1–1)
BLD GP AB SCN CELLS X3 SERPL QL: NORMAL
BUN SERPL-MCNC: 14 MG/DL (ref 8–23)
CALCIUM SERPL-MCNC: 7.6 MG/DL (ref 8.7–10.5)
CHLORIDE SERPL-SCNC: 103 MMOL/L (ref 95–110)
CO2 SERPL-SCNC: 27 MMOL/L (ref 23–29)
CREAT SERPL-MCNC: 0.8 MG/DL (ref 0.5–1.4)
DIFFERENTIAL METHOD: ABNORMAL
DIFFERENTIAL METHOD: ABNORMAL
EOSINOPHIL # BLD AUTO: 0.1 K/UL (ref 0–0.5)
EOSINOPHIL # BLD AUTO: 0.1 K/UL (ref 0–0.5)
EOSINOPHIL NFR BLD: 2.4 % (ref 0–8)
EOSINOPHIL NFR BLD: 2.4 % (ref 0–8)
ERYTHROCYTE [DISTWIDTH] IN BLOOD BY AUTOMATED COUNT: 17.6 % (ref 11.5–14.5)
ERYTHROCYTE [DISTWIDTH] IN BLOOD BY AUTOMATED COUNT: 17.6 % (ref 11.5–14.5)
EST. GFR  (AFRICAN AMERICAN): >60 ML/MIN/1.73 M^2
EST. GFR  (NON AFRICAN AMERICAN): >60 ML/MIN/1.73 M^2
GLUCOSE SERPL-MCNC: 93 MG/DL (ref 70–110)
HCT VFR BLD AUTO: 23.5 % (ref 40–54)
HCT VFR BLD AUTO: 23.5 % (ref 40–54)
HGB BLD-MCNC: 7.2 G/DL (ref 14–18)
HGB BLD-MCNC: 7.2 G/DL (ref 14–18)
IMM GRANULOCYTES # BLD AUTO: 0.02 K/UL (ref 0–0.04)
IMM GRANULOCYTES # BLD AUTO: 0.02 K/UL (ref 0–0.04)
IMM GRANULOCYTES NFR BLD AUTO: 0.7 % (ref 0–0.5)
IMM GRANULOCYTES NFR BLD AUTO: 0.7 % (ref 0–0.5)
LACTATE SERPL-SCNC: 1.2 MMOL/L (ref 0.5–2.2)
LYMPHOCYTES # BLD AUTO: 1.1 K/UL (ref 1–4.8)
LYMPHOCYTES # BLD AUTO: 1.1 K/UL (ref 1–4.8)
LYMPHOCYTES NFR BLD: 39.4 % (ref 18–48)
LYMPHOCYTES NFR BLD: 39.4 % (ref 18–48)
MAGNESIUM SERPL-MCNC: 2.1 MG/DL (ref 1.6–2.6)
MCH RBC QN AUTO: 29.3 PG (ref 27–31)
MCH RBC QN AUTO: 29.3 PG (ref 27–31)
MCHC RBC AUTO-ENTMCNC: 30.6 G/DL (ref 32–36)
MCHC RBC AUTO-ENTMCNC: 30.6 G/DL (ref 32–36)
MCV RBC AUTO: 96 FL (ref 82–98)
MCV RBC AUTO: 96 FL (ref 82–98)
MONOCYTES # BLD AUTO: 0.2 K/UL (ref 0.3–1)
MONOCYTES # BLD AUTO: 0.2 K/UL (ref 0.3–1)
MONOCYTES NFR BLD: 7.7 % (ref 4–15)
MONOCYTES NFR BLD: 7.7 % (ref 4–15)
NEUTROPHILS # BLD AUTO: 1.4 K/UL (ref 1.8–7.7)
NEUTROPHILS # BLD AUTO: 1.4 K/UL (ref 1.8–7.7)
NEUTROPHILS NFR BLD: 49.8 % (ref 38–73)
NEUTROPHILS NFR BLD: 49.8 % (ref 38–73)
NRBC BLD-RTO: 1 /100 WBC
NRBC BLD-RTO: 1 /100 WBC
PHOSPHATE SERPL-MCNC: 3.7 MG/DL (ref 2.7–4.5)
PLATELET # BLD AUTO: 182 K/UL (ref 150–350)
PLATELET # BLD AUTO: 182 K/UL (ref 150–350)
PMV BLD AUTO: 9.4 FL (ref 9.2–12.9)
PMV BLD AUTO: 9.4 FL (ref 9.2–12.9)
POTASSIUM SERPL-SCNC: 4 MMOL/L (ref 3.5–5.1)
PROT SERPL-MCNC: 9.2 G/DL (ref 6–8.4)
RBC # BLD AUTO: 2.46 M/UL (ref 4.6–6.2)
RBC # BLD AUTO: 2.46 M/UL (ref 4.6–6.2)
SODIUM SERPL-SCNC: 134 MMOL/L (ref 136–145)
WBC # BLD AUTO: 2.87 K/UL (ref 3.9–12.7)
WBC # BLD AUTO: 2.87 K/UL (ref 3.9–12.7)

## 2020-05-31 PROCEDURE — 85730 THROMBOPLASTIN TIME PARTIAL: CPT | Mod: HCNC

## 2020-05-31 PROCEDURE — 97530 THERAPEUTIC ACTIVITIES: CPT | Mod: HCNC

## 2020-05-31 PROCEDURE — 97167 OT EVAL HIGH COMPLEX 60 MIN: CPT | Mod: HCNC

## 2020-05-31 PROCEDURE — 25000003 PHARM REV CODE 250: Mod: HCNC | Performed by: NURSE PRACTITIONER

## 2020-05-31 PROCEDURE — 80053 COMPREHEN METABOLIC PANEL: CPT | Mod: HCNC

## 2020-05-31 PROCEDURE — 63600175 PHARM REV CODE 636 W HCPCS: Mod: HCNC | Performed by: FAMILY MEDICINE

## 2020-05-31 PROCEDURE — 86920 COMPATIBILITY TEST SPIN: CPT | Mod: 59,HCNC

## 2020-05-31 PROCEDURE — 83605 ASSAY OF LACTIC ACID: CPT | Mod: HCNC

## 2020-05-31 PROCEDURE — 36415 COLL VENOUS BLD VENIPUNCTURE: CPT | Mod: HCNC

## 2020-05-31 PROCEDURE — 97162 PT EVAL MOD COMPLEX 30 MIN: CPT | Mod: HCNC

## 2020-05-31 PROCEDURE — 85730 THROMBOPLASTIN TIME PARTIAL: CPT | Mod: 91,HCNC

## 2020-05-31 PROCEDURE — 36430 TRANSFUSION BLD/BLD COMPNT: CPT | Mod: HCNC

## 2020-05-31 PROCEDURE — 94640 AIRWAY INHALATION TREATMENT: CPT | Mod: HCNC

## 2020-05-31 PROCEDURE — 96366 THER/PROPH/DIAG IV INF ADDON: CPT

## 2020-05-31 PROCEDURE — 84100 ASSAY OF PHOSPHORUS: CPT | Mod: HCNC

## 2020-05-31 PROCEDURE — 96376 TX/PRO/DX INJ SAME DRUG ADON: CPT

## 2020-05-31 PROCEDURE — 25000242 PHARM REV CODE 250 ALT 637 W/ HCPCS: Mod: HCNC | Performed by: NURSE PRACTITIONER

## 2020-05-31 PROCEDURE — 86901 BLOOD TYPING SEROLOGIC RH(D): CPT | Mod: HCNC

## 2020-05-31 PROCEDURE — 85025 COMPLETE CBC W/AUTO DIFF WBC: CPT | Mod: HCNC

## 2020-05-31 PROCEDURE — P9016 RBC LEUKOCYTES REDUCED: HCPCS | Mod: HCNC

## 2020-05-31 PROCEDURE — G0378 HOSPITAL OBSERVATION PER HR: HCPCS | Mod: HCNC

## 2020-05-31 PROCEDURE — 83735 ASSAY OF MAGNESIUM: CPT | Mod: HCNC

## 2020-05-31 PROCEDURE — 94761 N-INVAS EAR/PLS OXIMETRY MLT: CPT | Mod: HCNC

## 2020-05-31 PROCEDURE — 97535 SELF CARE MNGMENT TRAINING: CPT | Mod: HCNC

## 2020-05-31 PROCEDURE — 63600175 PHARM REV CODE 636 W HCPCS: Mod: HCNC | Performed by: EMERGENCY MEDICINE

## 2020-05-31 RX ORDER — MICONAZOLE NITRATE 2 %
POWDER (GRAM) TOPICAL 2 TIMES DAILY
Status: DISCONTINUED | OUTPATIENT
Start: 2020-05-31 | End: 2020-06-03 | Stop reason: HOSPADM

## 2020-05-31 RX ORDER — AMOXICILLIN AND CLAVULANATE POTASSIUM 875; 125 MG/1; MG/1
1 TABLET, FILM COATED ORAL 2 TIMES DAILY
Status: DISCONTINUED | OUTPATIENT
Start: 2020-05-31 | End: 2020-06-03 | Stop reason: HOSPADM

## 2020-05-31 RX ORDER — HYDROCODONE BITARTRATE AND ACETAMINOPHEN 500; 5 MG/1; MG/1
TABLET ORAL
Status: DISCONTINUED | OUTPATIENT
Start: 2020-05-31 | End: 2020-06-03 | Stop reason: HOSPADM

## 2020-05-31 RX ORDER — TALC
6 POWDER (GRAM) TOPICAL NIGHTLY PRN
Status: DISCONTINUED | OUTPATIENT
Start: 2020-05-31 | End: 2020-06-03 | Stop reason: HOSPADM

## 2020-05-31 RX ORDER — METRONIDAZOLE 10 MG/G
GEL TOPICAL 2 TIMES DAILY
Status: DISCONTINUED | OUTPATIENT
Start: 2020-05-31 | End: 2020-06-03 | Stop reason: HOSPADM

## 2020-05-31 RX ADMIN — VANCOMYCIN HYDROCHLORIDE 1.25 G: 1.25 INJECTION, POWDER, LYOPHILIZED, FOR SOLUTION INTRAVENOUS at 04:05

## 2020-05-31 RX ADMIN — MELATONIN 6 MG: at 09:05

## 2020-05-31 RX ADMIN — ANTI-FUNGAL POWDER MICONAZOLE NITRATE TALC FREE: 1.42 POWDER TOPICAL at 02:05

## 2020-05-31 RX ADMIN — AMOXICILLIN AND CLAVULANATE POTASSIUM 1 TABLET: 875; 125 TABLET, FILM COATED ORAL at 09:05

## 2020-05-31 RX ADMIN — APIXABAN 10 MG: 2.5 TABLET, FILM COATED ORAL at 02:05

## 2020-05-31 RX ADMIN — IPRATROPIUM BROMIDE AND ALBUTEROL SULFATE 3 ML: .5; 3 SOLUTION RESPIRATORY (INHALATION) at 08:05

## 2020-05-31 RX ADMIN — ASPIRIN 81 MG: 81 TABLET, COATED ORAL at 09:05

## 2020-05-31 RX ADMIN — ANTI-FUNGAL POWDER MICONAZOLE NITRATE TALC FREE: 1.42 POWDER TOPICAL at 09:05

## 2020-05-31 RX ADMIN — DILTIAZEM HYDROCHLORIDE 120 MG: 120 CAPSULE, COATED, EXTENDED RELEASE ORAL at 09:05

## 2020-05-31 RX ADMIN — MICONAZOLE NITRATE: 2 OINTMENT TOPICAL at 10:05

## 2020-05-31 RX ADMIN — FUROSEMIDE 40 MG: 40 TABLET ORAL at 09:05

## 2020-05-31 RX ADMIN — METRONIDAZOLE: 10 GEL TOPICAL at 02:05

## 2020-05-31 RX ADMIN — AMOXICILLIN AND CLAVULANATE POTASSIUM 1 TABLET: 875; 125 TABLET, FILM COATED ORAL at 02:05

## 2020-05-31 RX ADMIN — HEPARIN SODIUM 16 UNITS/KG/HR: 10000 INJECTION, SOLUTION INTRAVENOUS at 06:05

## 2020-05-31 RX ADMIN — APIXABAN 10 MG: 2.5 TABLET, FILM COATED ORAL at 09:05

## 2020-05-31 RX ADMIN — MELATONIN 6 MG: at 02:05

## 2020-05-31 RX ADMIN — PANTOPRAZOLE SODIUM 40 MG: 40 TABLET, DELAYED RELEASE ORAL at 09:05

## 2020-05-31 RX ADMIN — OXYBUTYNIN CHLORIDE 10 MG: 5 TABLET, EXTENDED RELEASE ORAL at 09:05

## 2020-05-31 NOTE — PT/OT/SLP EVAL
Physical Therapy Evaluation    Patient Name:  Jatin Kohler   MRN:  [id number]    Recommendations:     Discharge Recommendations:  nursing facility, skilled   Discharge Equipment Recommendations: none   Barriers to discharge: Inaccessible home and Decreased caregiver support; Patient currently requires max assist of 2 for all functional mobility.  He lives home alone and has 4 steps to enter his house.    Assessment:     Jatin Kohler is a 80 y.o. male admitted with a medical diagnosis of Venous stasis dermatitis of both lower extremities.  He presents with the following impairments/functional limitations:  weakness, impaired endurance, impaired self care skills, impaired functional mobilty, gait instability, impaired balance, decreased coordination, decreased safety awareness, impaired skin, decreased upper extremity function, decreased lower extremity function.    Rehab Prognosis: Fair; patient would benefit from acute skilled PT services to address these deficits and reach maximum level of function.    Recent Surgery: * No surgery found *      Plan:     During this hospitalization, patient to be seen 5 x/week to address the identified rehab impairments via gait training, therapeutic exercises, therapeutic activities and progress toward the following goals:    · Plan of Care Expires:  06/07/20    Subjective     Chief Complaint: Weakness  Patient/Family Comments/goals: To go home and return to PLOF  Pain/Comfort:  · Pain Rating 1: 0/10    Patients cultural, spiritual, Lutheran conflicts given the current situation:      Living Environment:  Patient lives home alone in a one-story house with 4 steps and railing to enter.  The patient has two daughters that stop by to check on him regularly and provide some assistance as needed.  Prior to admission, patients level of function was modified indep amb with rollator short distances within the home, modified indep with ADLs, and indep with dressing (per patient).   Equipment used at home: shower chair, wheelchair, rollator, walker, rolling.  DME owned (not currently used): none.  Upon discharge, patient will have assistance from UNKNOWN.    Objective:     Communicated with [facility] and nurse Pati prior to session.  Patient found HOB elevated with telemetry, peripheral IV  upon PT entry to room.    General Precautions: Standard, fall   Orthopedic Precautions:N/A   Braces: N/A     Exams:  · Cognitive Exam:  Patient is oriented to Person, Place and Situation  · Gross Motor Coordination:  Decreased  · Postural Exam:  Patient presented with the following abnormalities:    · -       Rounded shoulders  · -       Forward head  · -       Posterior pelvic tilt  · Skin Integrity/Edema:      · -       Skin integrity: Wound Sacrum, BLE, R calf  · RLE ROM: WFL  · RLE Strength: grossly 4-/5  · LLE ROM: WFL  · LLE Strength: grossly 4-/5    Functional Mobility:  · Bed Mobility:     · Rolling Left:  maximal assistance  · Rolling Right: maximal assistance  · Scooting: maximal assistance and of 2 persons  · Supine to Sit: maximal assistance and of 2 persons  · Sit to Supine: maximal assistance and of 2 persons  · Transfers:     · Sit to Stand:  maximal assistance and of 2 persons with rolling walker  · Gait: Unable to progress to ambulation at this time; patient's RLE wound bleeding onto floor and patient urinated onto floor upon standing.  Nurse Pati notified.  · Balance: Poor sitting balance; poor standing balance      Therapeutic Activities and Exercises:   Patient participated in bed mobility rolling L/R with max assist several trials to change brief and blue pads.  Max assist x 2 for all other bed mobility.  Patient demonstrated poor sitting balance at edge of bed.  Noted RUE intention tremors.  Patient with anxiety and fear of falling.  R calf wound did not have a bandage and was bleeding.  Notified nurse.  Patient transferred sit <-> stand with max assist of 2 with RW and was able to  tolerate static standing with RW with max assist of 2 x 90 seconds.  Patient returned to bed instead of chair after session, with nurse's aide present to bathe him after urine accident.    AM-PAC 6 CLICK MOBILITY  Total Score:10     Patient left supine with all lines intact, call button in reach and nurse's aide and NP present.    GOALS:   Multidisciplinary Problems     Physical Therapy Goals        Problem: Physical Therapy Goal    Goal Priority Disciplines Outcome Goal Variances Interventions   Physical Therapy Goal     PT, PT/OT Ongoing, Progressing     Description:  LTGs to be met within 7 days (6/7/20):  1. Patient will perform bed mobility with mod assist  2.  Patient will perform functional transfers with RW with mod assist  3. Patient will ambulate 20 feet with RW with mod assist and no gross LOB  4.  Patient will perform BLE therapeutic exercises 10 x 2 in all planes                    History:     Past Medical History:   Diagnosis Date    *Atrial fibrillation     Anemia due to blood loss, chronic 10/15/2013    Anxiety     Bilateral carotid artery disease 6/2/2016    CHF (congestive heart failure)     Depression     Hyperlipidemia     Hypertension     Prostate hypertrophy     Sleep apnea 9/12/2013    Stroke     Venous insufficiency 9/12/2013       Past Surgical History:   Procedure Laterality Date    APPENDECTOMY      CATARACT EXTRACTION, BILATERAL  08/2013    CHOLECYSTECTOMY      COLON SURGERY      diverticulitis    EYE SURGERY  08/2013    cataract    HEMORRHOID SURGERY      JOINT REPLACEMENT      left hip    WOUND DEBRIDEMENT         Time Tracking:     PT Received On: 05/31/20  PT Start Time: 1026     PT Stop Time: 1105  PT Total Time (min): 39 min     Billable Minutes: Evaluation 16 min and Therapeutic Activity 23 min      Swapna Pino, PT, DPT  05/31/2020

## 2020-05-31 NOTE — H&P
[facility] Hospital Medicine  History & Physical    Patient Name: Jatin Kohler  MRN: [id number]  Admission Date: 5/30/2020  Attending Physician: Rufus Cabral MD   Primary Care Provider: Addi Saldaña MD         Patient information was obtained from patient, past medical records and ER records.     Subjective:     Principal Problem:Venous stasis dermatitis of both lower extremities    Chief Complaint:   Chief Complaint   Patient presents with    Cellulitis     BLE red, swollen, and sweeping. Wounds noted. Pts daughter also reports wound to buttucks        HPI: Pt is a 79 yo male with PMHx of CHF, HTN, PVD, Sleep apnea, Anemia due to chronic blood loss who presents due to worsened left leg swelling over the last week. Pt is W/C bound and has chronic wounds to the bilateral legs and to sacrum. According to daughter, he is noncompliant with medications. With the left leg swelling, pt has a oozing wound to the right medial leg and serous oozing from bilateral legs. Pt reports SOB with exertion and occasional cough but denies chest pain, fever, chills, abdominal pain, nausea/vomiting, diarrhea and constipation. Labs note WBC 3.75, H/H 8.2/26.1, glucose 148, , troponin 0.015 and lactate 3.1. Procalcitonin is normal and COVID negative. A venous US of LLE revealed and DVT to left femoral vein and greater saphenous vein. CXR - Enlarged heart with possible pleural fluid inferiorly. Pt is placed on Observation to initiate treatment for LLE DVT with concerns for bilateral leg cellulitis and a pressure ulcer to the sacral area. CANDICE - Kenya Soriano, pt's daughter.       Past Medical History:   Diagnosis Date    *Atrial fibrillation     Anemia due to blood loss, chronic 10/15/2013    Anxiety     Bilateral carotid artery disease 6/2/2016    CHF (congestive heart failure)     Depression     Hyperlipidemia     Hypertension     Prostate hypertrophy     Sleep apnea 9/12/2013    Stroke     Venous  insufficiency 2013       Past Surgical History:   Procedure Laterality Date    APPENDECTOMY      CATARACT EXTRACTION, BILATERAL  2013    CHOLECYSTECTOMY      COLON SURGERY      diverticulitis    EYE SURGERY  2013    cataract    HEMORRHOID SURGERY      JOINT REPLACEMENT      left hip    WOUND DEBRIDEMENT         Review of patient's allergies indicates:   Allergen Reactions    No known allergies        No current facility-administered medications on file prior to encounter.      Current Outpatient Medications on File Prior to Encounter   Medication Sig    aspirin (ECOTRIN) 81 MG EC tablet Take 1 tablet (81 mg total) by mouth once daily.    diltiaZEM (CARDIZEM CD) 120 MG Cp24 Take 1 capsule (120 mg total) by mouth once daily.    furosemide (LASIX) 40 MG tablet TAKE 1 TABLET(40 MG) BY MOUTH DAILY AS NEEDED    naproxen sodium (ANAPROX) 220 MG tablet Take 220 mg by mouth 2 (two) times daily as needed.    oxybutynin (DITROPAN-XL) 10 MG 24 hr tablet Take 1 tablet (10 mg total) by mouth once daily.    LORazepam (ATIVAN) 1 MG tablet Take 1 tablet (1 mg total) by mouth 2 (two) times daily as needed for Anxiety.     Family History     Problem Relation (Age of Onset)    Diabetes Mother, Sister, Sister, Sister    Heart disease Mother    Hypertension Brother        Tobacco Use    Smoking status: Former Smoker     Packs/day: 0.50     Years: 20.00     Pack years: 10.00     Types: Cigarettes     Last attempt to quit: 1998     Years since quittin.4    Smokeless tobacco: Never Used   Substance and Sexual Activity    Alcohol use: Not Currently     Comment: social use of alcohol    Drug use: No    Sexual activity: Not Currently     Birth control/protection: None     Review of Systems   Constitutional: Positive for activity change. Negative for appetite change, chills, diaphoresis, fatigue and fever.   HENT: Negative.    Eyes: Negative for redness and visual disturbance.   Respiratory: Positive for  cough and shortness of breath. Negative for wheezing.    Cardiovascular: Positive for leg swelling. Negative for chest pain and palpitations.   Gastrointestinal: Negative for abdominal pain, diarrhea, nausea and vomiting.   Genitourinary:        Incontinent of urine   Musculoskeletal:        Debility - W/C bound   Skin: Positive for color change and wound.   Neurological: Positive for tremors and weakness. Negative for dizziness, facial asymmetry, speech difficulty and light-headedness.   Psychiatric/Behavioral: Negative for confusion.     Objective:     Vital Signs (Most Recent):  Temp: 96.5 °F (35.8 °C) (05/30/20 1800)  Pulse: 109 (05/30/20 1800)  Resp: 20 (05/30/20 1800)  BP: 134/80 (05/30/20 1800)  SpO2: 95 % (05/30/20 1800) Vital Signs (24h Range):  Temp:  [96.5 °F (35.8 °C)-98 °F (36.7 °C)] 96.5 °F (35.8 °C)  Pulse:  [] 109  Resp:  [18-20] 20  SpO2:  [94 %-97 %] 95 %  BP: (122-151)/(60-85) 134/80     Weight: 101 kg (222 lb 11.2 oz)  Body mass index is 33.37 kg/m².    Physical Exam   Constitutional: He is oriented to person, place, and time. He appears well-developed and well-nourished.     Elder  male - unkempt in appearance   HENT:   Head: Normocephalic and atraumatic.   Nose: Nose normal.   Mouth/Throat: Oropharynx is clear and moist.   Eyes: Conjunctivae are normal. No scleral icterus.   Neck: Normal range of motion. Neck supple.   Cardiovascular: Normal heart sounds. An irregular rhythm present. Tachycardia present. Exam reveals no gallop and no friction rub.   No murmur heard.  Pulmonary/Chest: Effort normal and breath sounds normal.   Abdominal: Soft. Bowel sounds are normal. He exhibits no distension. There is no tenderness. There is no guarding.   obese   Musculoskeletal: He exhibits edema. He exhibits no tenderness.   Generalized weakness  Pitting edema to BLE with left being greater than the right   Neurological: He is alert and oriented to person, place, and time.   Skin: Skin is warm  and dry.   Sacral wound deferred - nurse to evaluate at bedside   Left lower leg appears slightly more erythematous with increased warm than right leg - more beefy erythema present to left leg    Right leg has erythema but appears chronic  There are chronic ulcerations with purulent drainage and various scabs   Psychiatric: He has a normal mood and affect. His behavior is normal.   Vitals reviewed.          Significant Labs:   CBC:   Recent Labs   Lab 05/30/20  1419   WBC 3.75*   HGB 8.2*   HCT 26.1*        CMP:   Recent Labs   Lab 05/30/20  1419      K 3.5      CO2 24   *   BUN 18   CREATININE 0.9   CALCIUM 8.1*   PROT 10.7*   ALBUMIN 2.3*   BILITOT 0.3   ALKPHOS 81   AST 16   ALT 14   ANIONGAP 10   EGFRNONAA >60     All pertinent labs within the past 24 hours have been reviewed.    Significant Imaging: I have reviewed all pertinent imaging results/findings within the past 24 hours.    Assessment/Plan:     * Venous stasis dermatitis of both lower extremities  LLE appears acutely infected - Vancomycin and Zosyn  Wound care consult pending      Chronic diastolic heart failure  Appears compensated  Will continue oral lasix  Strict I & O  Cardiac diet      Sacral decubitus ulcer  Wound care consult  Turn every 2 hours  Occlusive dressing      Acute deep vein thrombosis (DVT) of femoral vein of left lower extremity  According to daughter, pt has been on Coumadin in the past but when placed on Hospice the medication was discontinued  Will initiate heparin infusion  Will convert to oral medication - Eliquis -  prior to discharge      History of cerebrovascular accident (CVA) with residual deficit  Pt taking ASA only  Is not prescribed a STATIN  Pt is debilitated and transfers from recliner to W/C  PT/OT eval and treat      Mixed restrictive and obstructive lung disease  Supplemental oxygen to maintain sats > 92 %  DuoNebs prn      Permanent atrial fibrillation  Rate  = 100  Continue Telemetry  monitoring  Resume diltiazem and ASA  Pt is not prescribed anticoagulation        VTE Risk Mitigation (From admission, onward)         Ordered     heparin 25,000 units in dextrose 5% 250 mL (100 units/mL) infusion HIGH INTENSITY nomogram - [facility]  Continuous     Question:  Heparin Infusion Adjustment (DO NOT MODIFY ANSWER)  Answer:  [email or web address]\epic\Images\Pharmacy\HeparinInfusions\heparin HIGH INTENSITY nomogram for [facility] [id number].pdf    05/30/20 1556     heparin 25,000 units in dextrose 5% (100 units/ml) IV bolus from bag - ADDITIONAL PRN BOLUS - 60 units/kg  As needed (PRN)     Question:  Heparin Infusion Adjustment (DO NOT MODIFY ANSWER)  Answer:  \\[email or web address]\epic\Images\Pharmacy\HeparinInfusions\heparin HIGH INTENSITY nomogram for [facility] [id number].pdf    05/30/20 1556     heparin 25,000 units in dextrose 5% (100 units/ml) IV bolus from bag - ADDITIONAL PRN BOLUS - 30 units/kg  As needed (PRN)     Question:  Heparin Infusion Adjustment (DO NOT MODIFY ANSWER)  Answer:  \\[email or web address]\epic\Images\Pharmacy\HeparinInfusions\heparin HIGH INTENSITY nomogram for [facility] [id number].pdf    05/30/20 5543                   Elsa Perez NP  Department of Hospital Medicine   [facility]

## 2020-05-31 NOTE — HOSPITAL COURSE
Pt admitted with acute DVT to the left leg with swelling. Also, noted with BLE venous stasis dermatitis with associated cellulitis especially to the left lower leg. Pt is debilitated and has chronic wounds to the legs. He is wheelchair bound and has skin excoriations to the bilateral buttocks.  Also, with candida dermatitis to bilateral abdominal folds, topical nystatin in progress. IV Vanco and Zosyn deescalated to Augmentin. Also, topical miconazole and metronidazole bid to venous stasis dermatitis. PT/OT evaluated the patient documented need for maximum assist and recommends SNF placement. Case management assisting.  6/1/2020 - Afib with RVR per Telemetry and Cardiology recommended metoprolol 25 mg bid along with diltiazem and Eliquis. Wound care saw the patient and offered recs for venous stasis dermatitis and suspected deep tissue injury with excoriation to the buttock.  6/2/2020 - Pt is medically stable. LLE venous stasis dermatitis with associated cellulitis much improved. Less swelling appreciated to left leg. Heart rate improved. Pt anxious to be discharged. [facility] SNF placement is pending.   6/3/2020 - pt is medically stable. Insurance authorized SNF placement to [facility]. Pt was seen and examined and determined to be safe and stable for discharge. Pt was prescribed Eliquis, Augmentin, lopressor and topical meds for his venous stasis dermatitis. He was advised to follow up with PCP upon discharge from SNF.

## 2020-05-31 NOTE — PT/OT/SLP EVAL
Occupational Therapy   Evaluation    Name: Jatin Kohler  MRN: [id number]  Admitting Diagnosis:  Venous stasis dermatitis of both lower extremities      Recommendations:     Discharge Recommendations: nursing facility, skilled  Discharge Equipment Recommendations:  none  Barriers to discharge:  None    Assessment:     Jatin Kohler is a 80 y.o. male with a medical diagnosis of Venous stasis dermatitis of both lower extremities.  He presents with debility and generalized weakness. Performance deficits affecting function: weakness, impaired self care skills, impaired balance, decreased coordination, decreased safety awareness, impaired endurance, impaired functional mobilty, decreased upper extremity function, gait instability.      Rehab Prognosis: Good; patient would benefit from acute skilled OT services to address these deficits and reach maximum level of function.       Plan:     Patient to be seen 3 x/week to address the above listed problems via self-care/home management, therapeutic activities, therapeutic exercises  · Plan of Care Expires:    · Plan of Care Reviewed with: patient    Subjective     Chief Complaint: ***  Patient/Family Comments/goals:     Occupational Profile:  Living Environment: lives  Alone in 1 story 3 steps with raiing  Previous level of function: (I) with adl's and mod (I) with functional mobility  Roles and Routines: occupational; therapy  Equipment Used at Home:  power chair, shower chair  Assistance upon Discharge:     Pain/Comfort:  · Pain Rating 1: 0/10    Patients cultural, spiritual, Jain conflicts given the current situation:      Objective:     Communicated with: nurse Krueger and [facility] chart review prior to session.  Patient found HOB elevated with telemetry upon OT entry to room.    General Precautions: Standard, fall   Orthopedic Precautions:N/A   Braces: N/A     Occupational Performance:    Bed Mobility:    · Patient completed Rolling/Turning to Left with  maximal  assistance and 2 persons  · Patient completed Rolling/Turning to Right with maximal assistance and 2 persons  · Patient completed Scooting/Bridging with maximal assistance and 2 persons  · Patient completed Supine to Sit with maximal assistance and 2 persons  · Patient completed Sit to Supine with maximal assistance and 2 persons    Functional Mobility/Transfers:  · Patient completed Sit <> Stand Transfer with maximal assistance and of 2 persons  with  {IP PT OT AD TRANSFER [facility]:27800}       Activities of Daily Living:  · Upper Body Dressing: maximal assistance .  · Lower Body Dressing: total assistance .  · Toileting: total assistance .    Cognitive/Visual Perceptual:  Cognitive/Psychosocial Skills:     -       Oriented to: Person, Place, Time and Situation   -       Follows Commands/attention:Follows two-step commands  -       Communication: clear/fluent  -       Memory: unable to assess  -       Safety awareness/insight to disability: impaired   Visual/Perceptual:      -Intact .    Physical Exam:  Upper Extremity Range of Motion:     -       Right Upper Extremity: aarom wfl  -       Left Upper Extremity: aarom wfl  Upper Extremity Strength:    -       Right Upper Extremity: mmt: 2/5 grossly  -       Left Upper Extremity: {Extremity Strength:28593}   Strength: {IP OT UE ROM:28556}    AMPAC 6 Click ADL:  AMPAC Total Score: 12    Treatment & Education:    Education:    Patient left HOB elevated with all lines intact, call button in reach, chair alarm on, NP Elsa and nurse Pati notified and NP Elsa and [employer] present    GOALS:   Multidisciplinary Problems     Occupational Therapy Goals        Problem: Occupational Therapy Goal    Goal Priority Disciplines Outcome Interventions   Occupational Therapy Goal     OT, PT/OT     Description:  ot goals to be met by 6-7-20  Min a with ue dressing  Mod a with toileting  Pt will tolerate 1 set x 15 reps b ue rom exercise   Mod a with bsc t/f's                       History:     Past Medical History:   Diagnosis Date    *Atrial fibrillation     Anemia due to blood loss, chronic 10/15/2013    Anxiety     Bilateral carotid artery disease 6/2/2016    CHF (congestive heart failure)     Depression     Hyperlipidemia     Hypertension     Prostate hypertrophy     Sleep apnea 9/12/2013    Stroke     Venous insufficiency 9/12/2013       Past Surgical History:   Procedure Laterality Date    APPENDECTOMY      CATARACT EXTRACTION, BILATERAL  08/2013    CHOLECYSTECTOMY      COLON SURGERY      diverticulitis    EYE SURGERY  08/2013    cataract    HEMORRHOID SURGERY      JOINT REPLACEMENT      left hip    WOUND DEBRIDEMENT         Time Tracking:     OT Date of Treatment: 05/31/20  OT Start Time: 1026  OT Stop Time: 1104  OT Total Time (min): 38 min    Billable Minutes:Evaluation 8 minutes  Therapeutic Activity 15 minutes  Therapeutic Exercise 15 minutes    Jane Soto, OT  5/31/2020

## 2020-05-31 NOTE — PLAN OF CARE
Contacted Kenya Camille to verify home health. Per chart patient is current with [facility]. Kenya stated that her father is current with [facility]. He has been very non-compliant with his medications and with therapy. She stated that the home health has been trying to get him to [facility] [place] Discussed differences with [facility] [place] for SNF vs [facility] including payor source. Preference letter secured for [facility] [place] SNF first choice and [facility] as a second choice. Referrals placed in [facility] to both. PT and OT orders noted.          05/31/20 1258   Post-Acute Status   Post-Acute Authorization Placement   Post-Acute Placement Status Referrals Sent   Discharge Delays (!) Other

## 2020-05-31 NOTE — ASSESSMENT & PLAN NOTE
Initially heart rate 100 >>>> now more controlled  Continue Telemetry monitoring  Resume diltiazem and ASA  Pt is not prescribed anticoagulation>>>> Eliquis started for DVT

## 2020-05-31 NOTE — ASSESSMENT & PLAN NOTE
LLE appears acutely infected - Vancomycin and Zosyn initially  Vanc/Zosyn deescalated to Augmentin  Venous stasis dermatitis concerning for anaerobe/fungal infection - metronidazole and nystatin topical  Wound care consult pending

## 2020-05-31 NOTE — PROGRESS NOTES
[facility] Hospital Medicine  Progress Note    Patient Name: Jatin Kohler  MRN: [id number]  Patient Class: OP- Observation   Admission Date: 5/30/2020  Length of Stay: 0 days  Attending Physician: Rufus Cabral MD  Primary Care Provider: Addi Saldaña MD        Subjective:     Principal Problem:Venous stasis dermatitis of both lower extremities        HPI:  Pt is a 79 yo male with PMHx of CHF, HTN, PVD, Sleep apnea, Anemia due to chronic blood loss who presents due to worsened left leg swelling over the last week. Pt is W/C bound and has chronic wounds to the bilateral legs and to sacrum. According to daughter, he is noncompliant with medications. With the left leg swelling, pt has a oozing wound to the right medial leg and serous oozing from bilateral legs. Pt reports SOB with exertion and occasional cough but denies chest pain, fever, chills, abdominal pain, nausea/vomiting, diarrhea and constipation. Labs note WBC 3.75, H/H 8.2/26.1, glucose 148, , troponin 0.015 and lactate 3.1. Procalcitonin is normal and COVID negative. A venous US of LLE revealed and DVT to left femoral vein and greater saphenous vein. CXR - Enlarged heart with possible pleural fluid inferiorly. Pt is placed on Observation to initiate treatment for LLE DVT with concerns for bilateral leg cellulitis and a pressure ulcer to the sacral area. CANDICE - Kenya Bo, pt's daughter.       Overview/Hospital Course:  Pt admitted with acute DVT to the left leg with swelling. Also, noted with BLE venous stasis dermatitis with associated cellulitis especially to the left lower leg. Pt is debilitated and has chronic wounds to the legs. He is wheelchair bound and has skin excoriations to the bilateral buttocks. No sacral pressure ulcer. Also, with candida dermatitis to bilateral abdominal folds, topical nystatin in progress. IV Vanco and Zosyn deescalated to Augmentin. Also, topical miconazole and metronidazole bid to venous stasis  dermatitis. PT/OT evaluated the patient documented need for maximum assist and recommends SNF placement. Case management assisting.           Review of Systems   Constitutional: Positive for activity change. Negative for appetite change, chills, diaphoresis, fatigue and fever.   HENT: Negative.    Eyes: Negative for redness and visual disturbance.   Respiratory: Positive for shortness of breath (with exertion). Negative for cough and wheezing.    Cardiovascular: Positive for leg swelling. Negative for chest pain and palpitations.   Gastrointestinal: Negative for abdominal pain, diarrhea, nausea and vomiting.   Genitourinary:        Incontinent of urine   Musculoskeletal:        Debility - W/C bound   Skin: Positive for color change and wound.   Neurological: Positive for tremors and weakness. Negative for dizziness, facial asymmetry, speech difficulty and light-headedness.   Psychiatric/Behavioral: Negative for confusion.     Objective:     Vital Signs (Most Recent):  Temp: 98 °F (36.7 °C) (05/31/20 1530)  Pulse: 94 (05/31/20 1530)  Resp: 18 (05/31/20 1530)  BP: 116/65 (05/31/20 1530)  SpO2: 95 % (05/31/20 1530) Vital Signs (24h Range):  Temp:  [96.5 °F (35.8 °C)-98.6 °F (37 °C)] 98 °F (36.7 °C)  Pulse:  [] 94  Resp:  [16-20] 18  SpO2:  [89 %-97 %] 95 %  BP: (103-150)/(52-85) 116/65     Weight: 93.5 kg (206 lb 2.1 oz)  Body mass index is 30.89 kg/m².    Intake/Output Summary (Last 24 hours) at 5/31/2020 1539  Last data filed at 5/31/2020 1530  Gross per 24 hour   Intake 858.69 ml   Output 150 ml   Net 708.69 ml      Physical Exam   Constitutional: He is oriented to person, place, and time. He appears well-developed and well-nourished.     Elder  male - unkempt in appearance   HENT:   Head: Normocephalic and atraumatic.   Eyes: Conjunctivae are normal. No scleral icterus.   Neck: Normal range of motion. Neck supple.   Cardiovascular: Normal heart sounds. An irregular rhythm present. Tachycardia  present. Exam reveals no gallop and no friction rub.   No murmur heard.  Pulmonary/Chest: Effort normal and breath sounds normal.   Abdominal: Soft. Bowel sounds are normal. He exhibits no distension. There is no tenderness. There is no guarding.   obese   Musculoskeletal: He exhibits edema. He exhibits no tenderness.   Generalized weakness  Pitting edema to BLE with left being greater than the right   Neurological: He is alert and oriented to person, place, and time.   Skin: Skin is warm and dry.     Left lower leg appears slightly more erythematous with increased warm than right leg - more beefy erythema present to left leg    Right leg has erythema but appears chronic  There are chronic ulcerations with purulent drainage and various scabs    Skin excoriation that is open in places to the buttocks   Psychiatric: He has a normal mood and affect. His behavior is normal.   Vitals reviewed.      Significant Labs:   CBC:   Recent Labs   Lab 05/30/20  1419 05/31/20  0445   WBC 3.75* 2.87*  2.87*   HGB 8.2* 7.2*  7.2*   HCT 26.1* 23.5*  23.5*    182  182     CMP:   Recent Labs   Lab 05/30/20  1419 05/31/20  0445    134*   K 3.5 4.0    103   CO2 24 27   * 93   BUN 18 14   CREATININE 0.9 0.8   CALCIUM 8.1* 7.6*   PROT 10.7* 9.2*   ALBUMIN 2.3* 1.9*   BILITOT 0.3 0.3   ALKPHOS 81 74   AST 16 19   ALT 14 15   ANIONGAP 10 4*   EGFRNONAA >60 >60     All pertinent labs within the past 24 hours have been reviewed.    Significant Imaging: I have reviewed all pertinent imaging results/findings within the past 24 hours.      Assessment/Plan:      * Venous stasis dermatitis of both lower extremities with associated cellulitis  LLE appears acutely infected - Vancomycin and Zosyn initially  Vanc/Zosyn deescalated to Augmentin  Venous stasis dermatitis concerning for anaerobe/fungal infection - metronidazole and nystatin topical  Wound care consult pending      Acute on chronic anemia  No signs of active  bleeding - no melena, no hematochezia, hematemesis  Hgb/Hct 8.2/26.1 >>>> 7.2/23.5  Type and Cross match and transfuse 1 unit PrBCs      Debilitated  Will need SNF placement - per PT/OT will need maximum assist   to assist with placement      Chronic diastolic heart failure  Appears compensated  Will continue oral lasix  Strict I & O  Cardiac diet      Acute deep vein thrombosis (DVT) of femoral vein of left lower extremity  According to daughter, pt has been on Coumadin in the past but when placed on Hospice the medication was discontinued  Initially placed on heparin then converted to Eliquis  Eliquis 10 mg bid x 14 doses then 5 mg bid        History of cerebrovascular accident (CVA) with residual deficit  Pt taking ASA only  Is not prescribed a STATIN  Pt is debilitated and transfers from recliner to W/C  PT/OT eval and treat - needs max assist, will need SNF placement      Mixed restrictive and obstructive lung disease  Supplemental oxygen to maintain sats > 92 %  DuoNebs prn      Permanent atrial fibrillation  Initially heart rate 100 >>>> now more controlled  Continue Telemetry monitoring  Resume diltiazem and ASA  Pt is not prescribed anticoagulation>>>> Eliquis started for DVT        VTE Risk Mitigation (From admission, onward)         Ordered     apixaban tablet 10 mg  2 times daily      05/31/20 1149                      Elsa Perez NP  Department of Hospital Medicine   [facility]

## 2020-05-31 NOTE — PLAN OF CARE
Met with patient to complete initial discharge planning assessment. Patient stated that he lives alone and that his daughter Kenya is his primary support but that she works full time. Patient denies having any home health services. Per the chart he is current with [facility]. Will contact his daughter to verify what services the patient has. Patient denies any post hospital needs or services at this time.  Updated white board with 's name and number. Transitional Care Folder, Discharge Planning Begins on Admission pamphlet, [facility] Bedside Delivery pamphlet, Advance Directive information given to patient along with the contact information given.Instructed patient or family to call with any questions or concerns.          D/C Plan: TBD  PCP: Addi Saldaña MD  Preferred Pharmacy: [facility] in [place]  Discharge transportation: pov  [facility]  Pharmacy Bedside Delivery:[facility].         05/31/20 1000   Discharge Assessment   Assessment Type Discharge Planning Assessment   Confirmed/corrected address and phone number on facesheet? Yes   Assessment information obtained from? Patient;Caregiver;Medical Record   Expected Length of Stay (days)   (tbd)   Communicated expected length of stay with patient/caregiver yes   Prior to hospitilization cognitive status: Unable to Assess   Prior to hospitalization functional status: Needs Assistance;Assistive Equipment   Current cognitive status: Alert/Oriented   Current Functional Status: Partially Dependent;Needs Assistance   Facility Arrived From: home   Lives With alone   Able to Return to Prior Arrangements no   Is patient able to care for self after discharge? Unable to determine at this time (comments)   Who are your caregiver(s) and their phone number(s)? Kenya Obrien ( daughter ) [phone]   Patient's perception of discharge disposition home health;skilled nursing facility;home or selfcare   Readmission Within the Last 30 Days no  previous admission in last 30 days   Patient currently being followed by outpatient case management? No   Patient currently receives any other outside agency services? Yes   Name and contact number of agency or person providing outside services [facility]   Is it the patient/care giver preference to resume care with the current outside agency? Other (comment)  (Patient will require additional care)   Equipment Currently Used at Home wheelchair;walker, rolling;cane, straight;shower chair;power chair   Do you have any problems affording any of your prescribed medications? No   Is the patient taking medications as prescribed? no   If no, which medications is patient not taking? non-compliance   Does the patient have transportation home? Yes   Transportation Anticipated family or friend will provide   Does the patient receive services at the [facility]? No   Discharge Plan A Skilled Nursing Facility   Discharge Plan B Home Health   DME Needed Upon Discharge  none   Patient/Family in Agreement with Plan yes

## 2020-05-31 NOTE — PLAN OF CARE
POC reviewed. Comfort measures and safety measures addressed. Telemetry monitoring NSR. Bed alarm activated. IV antibiotics as ordered.Wound care consulted for BLE and sacral wounds. Will continue to monitor.

## 2020-05-31 NOTE — PLAN OF CARE
05/31/20 1000   BRAXTON Message   Medicare Outpatient and Observation Notification regarding financial responsibility Given to patient/caregiver;Signed/date by patient/caregiver   Date BRAXTON was signed 05/31/20   Time BRAXTON was signed 1000

## 2020-05-31 NOTE — ASSESSMENT & PLAN NOTE
No signs of active bleeding - no melena, no hematochezia, hematemesis  Hgb/Hct 8.2/26.1 >>>> 7.2/23.5  Type and Cross match and transfuse 1 unit PrBCs

## 2020-05-31 NOTE — PROGRESS NOTES
Therapy with vancomycin complete and/or consult discontinued by provider.  Pharmacy will sign off, please re-consult as needed.    Thank you for allowing us to participate in this patient's care.    Nargis Thompson, PharmD 5/31/2020 11:47 AM

## 2020-05-31 NOTE — NURSING
Patient admitted from Er. AAO  Bilateral leg cellulitis, wound on buttock,   Heparin in fusing at 18u/kg/ u for Blood clot  IV abx for sepsis   PIV x 2 to right arm  Fall risk- bed alarm , call light in reach

## 2020-05-31 NOTE — PLAN OF CARE
POC reviewed with patient and daughter Kenya MARKS disoriented to time  Received 1 unit of Blood - no s/s of adverse reaction  Wound care as ordered for preexisting wounds, waffle mattress, turned   Fall risk- bed alarm, call light in reach  Left side deficit r/t hx of stroke- items in reach to right side  Heparin gtt discontinued this shift  PIV x 2 SL.  ABX continue for sepsis   Incontinent of bladder- marti care provided

## 2020-05-31 NOTE — ASSESSMENT & PLAN NOTE
Pt taking ASA only  Is not prescribed a STATIN  Pt is debilitated and transfers from recliner to W/C  PT/OT eval and treat - needs max assist, will need SNF placement

## 2020-05-31 NOTE — ASSESSMENT & PLAN NOTE
According to daughter, pt has been on Coumadin in the past but when placed on Hospice the medication was discontinued  Initially placed on heparin then converted to Eliquis  Eliquis 10 mg bid x 14 doses then 5 mg bid

## 2020-05-31 NOTE — SUBJECTIVE & OBJECTIVE
Review of Systems   Constitutional: Positive for activity change. Negative for appetite change, chills, diaphoresis, fatigue and fever.   HENT: Negative.    Eyes: Negative for redness and visual disturbance.   Respiratory: Positive for shortness of breath (with exertion). Negative for cough and wheezing.    Cardiovascular: Positive for leg swelling. Negative for chest pain and palpitations.   Gastrointestinal: Negative for abdominal pain, diarrhea, nausea and vomiting.   Genitourinary:        Incontinent of urine   Musculoskeletal:        Debility - W/C bound   Skin: Positive for color change and wound.   Neurological: Positive for tremors and weakness. Negative for dizziness, facial asymmetry, speech difficulty and light-headedness.   Psychiatric/Behavioral: Negative for confusion.     Objective:     Vital Signs (Most Recent):  Temp: 98 °F (36.7 °C) (05/31/20 1530)  Pulse: 94 (05/31/20 1530)  Resp: 18 (05/31/20 1530)  BP: 116/65 (05/31/20 1530)  SpO2: 95 % (05/31/20 1530) Vital Signs (24h Range):  Temp:  [96.5 °F (35.8 °C)-98.6 °F (37 °C)] 98 °F (36.7 °C)  Pulse:  [] 94  Resp:  [16-20] 18  SpO2:  [89 %-97 %] 95 %  BP: (103-150)/(52-85) 116/65     Weight: 93.5 kg (206 lb 2.1 oz)  Body mass index is 30.89 kg/m².    Intake/Output Summary (Last 24 hours) at 5/31/2020 1539  Last data filed at 5/31/2020 1530  Gross per 24 hour   Intake 858.69 ml   Output 150 ml   Net 708.69 ml      Physical Exam   Constitutional: He is oriented to person, place, and time. He appears well-developed and well-nourished.     Elder  male - unkempt in appearance   HENT:   Head: Normocephalic and atraumatic.   Eyes: Conjunctivae are normal. No scleral icterus.   Neck: Normal range of motion. Neck supple.   Cardiovascular: Normal heart sounds. An irregular rhythm present. Tachycardia present. Exam reveals no gallop and no friction rub.   No murmur heard.  Pulmonary/Chest: Effort normal and breath sounds normal.   Abdominal:  Soft. Bowel sounds are normal. He exhibits no distension. There is no tenderness. There is no guarding.   obese   Musculoskeletal: He exhibits edema. He exhibits no tenderness.   Generalized weakness  Pitting edema to BLE with left being greater than the right   Neurological: He is alert and oriented to person, place, and time.   Skin: Skin is warm and dry.     Left lower leg appears slightly more erythematous with increased warm than right leg - more beefy erythema present to left leg    Right leg has erythema but appears chronic  There are chronic ulcerations with purulent drainage and various scabs    Skin excoriation that is open in places to the buttocks   Psychiatric: He has a normal mood and affect. His behavior is normal.   Vitals reviewed.      Significant Labs:   CBC:   Recent Labs   Lab 05/30/20  1419 05/31/20  0445   WBC 3.75* 2.87*  2.87*   HGB 8.2* 7.2*  7.2*   HCT 26.1* 23.5*  23.5*    182  182     CMP:   Recent Labs   Lab 05/30/20  1419 05/31/20  0445    134*   K 3.5 4.0    103   CO2 24 27   * 93   BUN 18 14   CREATININE 0.9 0.8   CALCIUM 8.1* 7.6*   PROT 10.7* 9.2*   ALBUMIN 2.3* 1.9*   BILITOT 0.3 0.3   ALKPHOS 81 74   AST 16 19   ALT 14 15   ANIONGAP 10 4*   EGFRNONAA >60 >60     All pertinent labs within the past 24 hours have been reviewed.    Significant Imaging: I have reviewed all pertinent imaging results/findings within the past 24 hours.

## 2020-06-01 LAB
ALBUMIN SERPL BCP-MCNC: 2 G/DL (ref 3.5–5.2)
ALP SERPL-CCNC: 74 U/L (ref 55–135)
ALT SERPL W/O P-5'-P-CCNC: 15 U/L (ref 10–44)
ANION GAP SERPL CALC-SCNC: 6 MMOL/L (ref 8–16)
AST SERPL-CCNC: 19 U/L (ref 10–40)
BASOPHILS # BLD AUTO: 0 K/UL (ref 0–0.2)
BASOPHILS NFR BLD: 0 % (ref 0–1.9)
BILIRUB SERPL-MCNC: 0.3 MG/DL (ref 0.1–1)
BUN SERPL-MCNC: 12 MG/DL (ref 8–23)
CALCIUM SERPL-MCNC: 7.7 MG/DL (ref 8.7–10.5)
CHLORIDE SERPL-SCNC: 103 MMOL/L (ref 95–110)
CO2 SERPL-SCNC: 27 MMOL/L (ref 23–29)
CREAT SERPL-MCNC: 0.9 MG/DL (ref 0.5–1.4)
DIFFERENTIAL METHOD: ABNORMAL
EOSINOPHIL # BLD AUTO: 0.1 K/UL (ref 0–0.5)
EOSINOPHIL NFR BLD: 3.6 % (ref 0–8)
ERYTHROCYTE [DISTWIDTH] IN BLOOD BY AUTOMATED COUNT: 17.3 % (ref 11.5–14.5)
EST. GFR  (AFRICAN AMERICAN): >60 ML/MIN/1.73 M^2
EST. GFR  (NON AFRICAN AMERICAN): >60 ML/MIN/1.73 M^2
GLUCOSE SERPL-MCNC: 91 MG/DL (ref 70–110)
HCT VFR BLD AUTO: 27.9 % (ref 40–54)
HGB BLD-MCNC: 8.8 G/DL (ref 14–18)
IMM GRANULOCYTES # BLD AUTO: 0.01 K/UL (ref 0–0.04)
IMM GRANULOCYTES NFR BLD AUTO: 0.4 % (ref 0–0.5)
LYMPHOCYTES # BLD AUTO: 0.7 K/UL (ref 1–4.8)
LYMPHOCYTES NFR BLD: 29.1 % (ref 18–48)
MCH RBC QN AUTO: 29.3 PG (ref 27–31)
MCHC RBC AUTO-ENTMCNC: 31.5 G/DL (ref 32–36)
MCV RBC AUTO: 93 FL (ref 82–98)
MONOCYTES # BLD AUTO: 0.2 K/UL (ref 0.3–1)
MONOCYTES NFR BLD: 6.8 % (ref 4–15)
NEUTROPHILS # BLD AUTO: 1.5 K/UL (ref 1.8–7.7)
NEUTROPHILS NFR BLD: 60.1 % (ref 38–73)
NRBC BLD-RTO: 0 /100 WBC
PLATELET # BLD AUTO: 182 K/UL (ref 150–350)
PMV BLD AUTO: 9.3 FL (ref 9.2–12.9)
POTASSIUM SERPL-SCNC: 3.7 MMOL/L (ref 3.5–5.1)
PROT SERPL-MCNC: 9.7 G/DL (ref 6–8.4)
RBC # BLD AUTO: 3 M/UL (ref 4.6–6.2)
SODIUM SERPL-SCNC: 136 MMOL/L (ref 136–145)
WBC # BLD AUTO: 2.51 K/UL (ref 3.9–12.7)

## 2020-06-01 PROCEDURE — 25000003 PHARM REV CODE 250: Mod: HCNC | Performed by: NURSE PRACTITIONER

## 2020-06-01 PROCEDURE — 36415 COLL VENOUS BLD VENIPUNCTURE: CPT | Mod: HCNC

## 2020-06-01 PROCEDURE — 80053 COMPREHEN METABOLIC PANEL: CPT | Mod: HCNC

## 2020-06-01 PROCEDURE — 25000242 PHARM REV CODE 250 ALT 637 W/ HCPCS: Mod: HCNC | Performed by: NURSE PRACTITIONER

## 2020-06-01 PROCEDURE — 93005 ELECTROCARDIOGRAM TRACING: CPT | Mod: HCNC

## 2020-06-01 PROCEDURE — 93010 ELECTROCARDIOGRAM REPORT: CPT | Mod: HCNC,,, | Performed by: INTERNAL MEDICINE

## 2020-06-01 PROCEDURE — 99220 PR INITIAL OBSERVATION CARE,LEVL III: CPT | Mod: HCNC,,, | Performed by: INTERNAL MEDICINE

## 2020-06-01 PROCEDURE — 93010 EKG 12-LEAD: ICD-10-PCS | Mod: HCNC,,, | Performed by: INTERNAL MEDICINE

## 2020-06-01 PROCEDURE — 94640 AIRWAY INHALATION TREATMENT: CPT | Mod: HCNC

## 2020-06-01 PROCEDURE — 85025 COMPLETE CBC W/AUTO DIFF WBC: CPT | Mod: HCNC

## 2020-06-01 PROCEDURE — G0378 HOSPITAL OBSERVATION PER HR: HCPCS | Mod: HCNC

## 2020-06-01 PROCEDURE — 99220 PR INITIAL OBSERVATION CARE,LEVL III: ICD-10-PCS | Mod: HCNC,,, | Performed by: INTERNAL MEDICINE

## 2020-06-01 PROCEDURE — 97802 MEDICAL NUTRITION INDIV IN: CPT | Mod: HCNC

## 2020-06-01 PROCEDURE — 97530 THERAPEUTIC ACTIVITIES: CPT | Mod: HCNC

## 2020-06-01 RX ORDER — METOPROLOL TARTRATE 25 MG/1
25 TABLET, FILM COATED ORAL 2 TIMES DAILY
Status: DISCONTINUED | OUTPATIENT
Start: 2020-06-01 | End: 2020-06-03 | Stop reason: HOSPADM

## 2020-06-01 RX ORDER — METOPROLOL TARTRATE 25 MG/1
12.5 TABLET ORAL 2 TIMES DAILY
Status: DISCONTINUED | OUTPATIENT
Start: 2020-06-01 | End: 2020-06-01

## 2020-06-01 RX ADMIN — OXYBUTYNIN CHLORIDE 10 MG: 5 TABLET, EXTENDED RELEASE ORAL at 08:06

## 2020-06-01 RX ADMIN — METOPROLOL TARTRATE 25 MG: 25 TABLET ORAL at 09:06

## 2020-06-01 RX ADMIN — FUROSEMIDE 40 MG: 40 TABLET ORAL at 08:06

## 2020-06-01 RX ADMIN — ANTI-FUNGAL POWDER MICONAZOLE NITRATE TALC FREE: 1.42 POWDER TOPICAL at 08:06

## 2020-06-01 RX ADMIN — MICONAZOLE NITRATE: 2 OINTMENT TOPICAL at 09:06

## 2020-06-01 RX ADMIN — IPRATROPIUM BROMIDE AND ALBUTEROL SULFATE 3 ML: .5; 3 SOLUTION RESPIRATORY (INHALATION) at 07:06

## 2020-06-01 RX ADMIN — ACETAMINOPHEN 650 MG: 325 TABLET ORAL at 08:06

## 2020-06-01 RX ADMIN — METRONIDAZOLE: 10 GEL TOPICAL at 03:06

## 2020-06-01 RX ADMIN — METOPROLOL TARTRATE 12.5 MG: 25 TABLET, FILM COATED ORAL at 10:06

## 2020-06-01 RX ADMIN — AMOXICILLIN AND CLAVULANATE POTASSIUM 1 TABLET: 875; 125 TABLET, FILM COATED ORAL at 09:06

## 2020-06-01 RX ADMIN — APIXABAN 10 MG: 2.5 TABLET, FILM COATED ORAL at 08:06

## 2020-06-01 RX ADMIN — APIXABAN 10 MG: 2.5 TABLET, FILM COATED ORAL at 09:06

## 2020-06-01 RX ADMIN — DILTIAZEM HYDROCHLORIDE 120 MG: 120 CAPSULE, COATED, EXTENDED RELEASE ORAL at 08:06

## 2020-06-01 RX ADMIN — MICONAZOLE NITRATE: 2 OINTMENT TOPICAL at 08:06

## 2020-06-01 RX ADMIN — PANTOPRAZOLE SODIUM 40 MG: 40 TABLET, DELAYED RELEASE ORAL at 08:06

## 2020-06-01 RX ADMIN — AMOXICILLIN AND CLAVULANATE POTASSIUM 1 TABLET: 875; 125 TABLET, FILM COATED ORAL at 08:06

## 2020-06-01 RX ADMIN — METRONIDAZOLE: 10 GEL TOPICAL at 04:06

## 2020-06-01 RX ADMIN — ASPIRIN 81 MG: 81 TABLET, COATED ORAL at 08:06

## 2020-06-01 RX ADMIN — ANTI-FUNGAL POWDER MICONAZOLE NITRATE TALC FREE: 1.42 POWDER TOPICAL at 09:06

## 2020-06-01 NOTE — CONSULTS
[facility] -   Cardiology  Consult Note    Patient Name: Jatin Kohler  MRN: [id number]  Admission Date: 5/30/2020  Hospital Length of Stay: 0 days  Code Status: Full Code   Attending Provider: Rufus Cabral MD   Consulting Provider: VANE Gee  Primary Care Physician: Addi Saldaña MD  Principal Problem:Venous stasis dermatitis of both lower extremities    Patient information was obtained from patient, past medical records and ER records.     Inpatient consult to Cardiology  Consult performed by: VANE Trinidad  Consult ordered by: Elsa Perez NP        Subjective:     Chief Complaint:  Leg swelling     HPI:   Jatin Kohler is a 80 year old male who presented to [facility] due to worsening left leg swelling over the past week on 5/30/20. His current medical conditions include CHF, HTN, PVD, RAQUEL, HTN, HLP, Anemia, CVA. Patient is wheelchair bound and has chronic wounds to bilateral legs and sacrum. He was placed in observation under the care of hospital medicine. ED workup revealed LLE Venous US +DVT of Left femoral vein and greater saphenous vein. He was started on Eliquis for treatment of acute DVT. Cardiology consulted on 6/1/20 to assist with medical management of new onset AFIB/RVR. Chart reviewed, patient seen and examined. ECHO from 1/2020 revealed EF 60-65%, DD. Denies any cardiac complaints today on exam. NO chest pain or anginal equivalents. No shortness of breath, HSU or palpitations. Trace LE edema today. Optimize BB therapy today and assess response. Further recs to follow.     Past Medical History:   Diagnosis Date    *Atrial fibrillation     Anemia due to blood loss, chronic 10/15/2013    Anxiety     Bilateral carotid artery disease 6/2/2016    CHF (congestive heart failure)     Depression     Hyperlipidemia     Hypertension     Prostate hypertrophy     Sleep apnea 9/12/2013    Stroke     Venous insufficiency 9/12/2013       Past Surgical History:    Procedure Laterality Date    APPENDECTOMY      CATARACT EXTRACTION, BILATERAL  2013    CHOLECYSTECTOMY      COLON SURGERY      diverticulitis    EYE SURGERY  2013    cataract    HEMORRHOID SURGERY      JOINT REPLACEMENT      left hip    WOUND DEBRIDEMENT         Review of patient's allergies indicates:   Allergen Reactions    No known allergies        No current facility-administered medications on file prior to encounter.      Current Outpatient Medications on File Prior to Encounter   Medication Sig    aspirin (ECOTRIN) 81 MG EC tablet Take 1 tablet (81 mg total) by mouth once daily.    diltiaZEM (CARDIZEM CD) 120 MG Cp24 Take 1 capsule (120 mg total) by mouth once daily.    furosemide (LASIX) 40 MG tablet TAKE 1 TABLET(40 MG) BY MOUTH DAILY AS NEEDED    naproxen sodium (ANAPROX) 220 MG tablet Take 220 mg by mouth 2 (two) times daily as needed.    oxybutynin (DITROPAN-XL) 10 MG 24 hr tablet Take 1 tablet (10 mg total) by mouth once daily.    LORazepam (ATIVAN) 1 MG tablet Take 1 tablet (1 mg total) by mouth 2 (two) times daily as needed for Anxiety.     Family History     Problem Relation (Age of Onset)    Diabetes Mother, Sister, Sister, Sister    Heart disease Mother    Hypertension Brother        Tobacco Use    Smoking status: Former Smoker     Packs/day: 0.50     Years: 20.00     Pack years: 10.00     Types: Cigarettes     Last attempt to quit: 1998     Years since quittin.4    Smokeless tobacco: Never Used   Substance and Sexual Activity    Alcohol use: Not Currently     Comment: social use of alcohol    Drug use: No    Sexual activity: Not Currently     Birth control/protection: None     Review of Systems   Constitution: Positive for malaise/fatigue.   HENT: Negative for hearing loss and hoarse voice.    Eyes: Negative for blurred vision and visual disturbance.   Cardiovascular: Positive for dyspnea on exertion and irregular heartbeat. Negative for chest pain,  claudication, leg swelling, near-syncope, orthopnea, palpitations, paroxysmal nocturnal dyspnea and syncope.   Respiratory: Negative for cough, hemoptysis, shortness of breath, sleep disturbances due to breathing, snoring and wheezing.    Endocrine: Negative for cold intolerance and heat intolerance.   Hematologic/Lymphatic: Bruises/bleeds easily (on eliquis).   Skin: Positive for poor wound healing. Negative for color change, dry skin and nail changes.   Musculoskeletal: Positive for arthritis, back pain and joint pain. Negative for myalgias.        Wheelchair bound   Gastrointestinal: Negative for bloating, abdominal pain, constipation, nausea and vomiting.   Genitourinary: Negative for dysuria, flank pain, hematuria and hesitancy.   Neurological: Positive for weakness. Negative for headaches, light-headedness, loss of balance, numbness and paresthesias.   Psychiatric/Behavioral: Negative for altered mental status.   Allergic/Immunologic: Negative for environmental allergies.     Objective:     Vital Signs (Most Recent):  Temp: 98.3 °F (36.8 °C) (06/01/20 1134)  Pulse: 94 (06/01/20 1307)  Resp: 18 (06/01/20 1134)  BP: (!) 156/67 (06/01/20 1134)  SpO2: (!) 93 % (06/01/20 1134) Vital Signs (24h Range):  Temp:  [97.8 °F (36.6 °C)-98.3 °F (36.8 °C)] 98.3 °F (36.8 °C)  Pulse:  [] 94  Resp:  [18] 18  SpO2:  [93 %-98 %] 93 %  BP: (116-156)/(64-70) 156/67     Weight: 93.1 kg (205 lb 4 oz)  Body mass index is 30.75 kg/m².    SpO2: (!) 93 %  O2 Device (Oxygen Therapy): room air      Intake/Output Summary (Last 24 hours) at 6/1/2020 1349  Last data filed at 6/1/2020 1100  Gross per 24 hour   Intake 1208 ml   Output 800 ml   Net 408 ml       Lines/Drains/Airways     Peripheral Intravenous Line                 Peripheral IV - Single Lumen 05/30/20 1420 20 G Right Forearm 1 day         Peripheral IV - Single Lumen 05/30/20 1716 20 G Right Antecubital 1 day                Physical Exam   Constitutional: He is oriented to  person, place, and time. He appears well-developed and well-nourished. No distress.   HENT:   Head: Normocephalic and atraumatic.   Eyes: Pupils are equal, round, and reactive to light.   Neck: Normal range of motion and full passive range of motion without pain. Neck supple. No JVD present.   Cardiovascular: S1 normal, S2 normal and intact distal pulses. An irregularly irregular rhythm present. Tachycardia present. PMI is not displaced. Exam reveals no distant heart sounds.   No murmur heard.  Pulses:       Radial pulses are 2+ on the right side, and 2+ on the left side.        Dorsalis pedis pulses are 2+ on the right side, and 2+ on the left side.   Remains in AFIB    Pulmonary/Chest: Effort normal. No accessory muscle usage. No respiratory distress. He has no decreased breath sounds. He has no wheezes. He has no rales.   Abdominal: Soft. Bowel sounds are normal. He exhibits no distension. There is no tenderness.   Musculoskeletal: Normal range of motion. He exhibits edema (trace LE).        Right ankle: He exhibits swelling.        Left ankle: He exhibits swelling.   Trace BLE edema   Neurological: He is alert and oriented to person, place, and time.   Skin: Skin is warm and dry. He is not diaphoretic. No cyanosis. Nails show no clubbing.   Psychiatric: He has a normal mood and affect. His speech is normal and behavior is normal. Judgment and thought content normal. Cognition and memory are normal.   Nursing note and vitals reviewed.      Significant Labs:   BMP:   Recent Labs   Lab 05/31/20  0445 06/01/20  0506   GLU 93 91   * 136   K 4.0 3.7    103   CO2 27 27   BUN 14 12   CREATININE 0.8 0.9   CALCIUM 7.6* 7.7*   MG 2.1  --    , CBC   Recent Labs   Lab 05/31/20  0445 06/01/20  0506   WBC 2.87*  2.87* 2.51*   HGB 7.2*  7.2* 8.8*   HCT 23.5*  23.5* 27.9*     182 182    and All pertinent lab results from the last 24 hours have been reviewed.    Significant Imaging: Echocardiogram:   2D  echo with color flow doppler:   Results for orders placed or performed during the hospital encounter of 01/11/20   2D echo with color flow doppler   Result Value Ref Range    QEF 60 55 - 65    Diastolic Dysfunction Yes (A)     Est. PA Systolic Pressure 26.42     Narrative    Date of Procedure: 01/12/2020        TEST DESCRIPTION       Aorta: The aortic root is normal in size, measuring 2.2 cm at sinotubular junction and 3.3 cm at Sinuses of Valsalva.     Left Atrium: The left atrial volume index is severely enlarged, measuring 53.06 cc/m2.     Left Ventricle: The left ventricle is normal in size, with an end-diastolic diameter of 4.2 cm, and an end-systolic diameter of 2.7 cm. LV wall thickness is normal, with the septum measuring 1.4 cm and the posterior wall measuring 1.1 cm across. Relative   wall thickness was increased at 0.52, and the LV mass index was 105.3 g/m2 consistent with concentric remodeling. There are no regional wall motion abnormalities. Left ventricular systolic function appears normal. Visually estimated ejection fraction is   60-65%. The LV Doppler derived stroke volume equals 49.0 ccs.     Diastolic indices: E wave velocity 0.8 m/s, E/A ratio 2.1,  msec., E/e' ratio(avg) 10. There is diastolic dysfunction secondary to relaxation abnormality.     Right Atrium: The right atrium is normal in size, measuring 5.8 cm in length and 3.7 cm in width in the apical view.     Right Ventricle: The right ventricle is normal in size. Global right ventricular systolic function appears normal. Tricuspid annular plane systolic excursion (TAPSE) is .8 cm. The estimated PA systolic pressure is greater than 26 mmHg.     Aortic Valve:  The mean gradient obtained across the aortic valve is 9 mmHg.     Mitral Valve:  The pressure half time is 52 msec. The calculated mitral valve area is 4.23 cm2.     Intracavitary: There is no evidence of pericardial effusion, intracavity mass, thrombi, or vegetation.          CONCLUSIONS     1 - Severe left atrial enlargement.     2 - Concentric remodeling.     3 - No wall motion abnormalities.     4 - Normal left ventricular systolic function (EF 60-65%).     5 - Impaired LV relaxation, normal LAP (grade 1 diastolic dysfunction).     6 - Normal right ventricular systolic function .     7 - The estimated PA systolic pressure is greater than 26 mmHg.             This document has been electronically    SIGNED BY: Johnson San MD On: 01/12/2020 12:09    and Transthoracic echo (TTE) complete (Cupid Only): No results found for this or any previous visit. and X-Ray: CXR: X-Ray Chest 1 View (CXR): No results found for this visit on 05/30/20.    Assessment and Plan:     * Venous stasis dermatitis of both lower extremities with associated cellulitis  Mgmt per primary team      Acute on chronic anemia  Monitor closely  Transfuse as needed per primary team    Chronic diastolic heart failure  Continue BB, CCB, Lasix  Add ACEi/ARB as BP tolerates    Acute deep vein thrombosis (DVT) of femoral vein of left lower extremity  On Eliquis for acute DVT      Permanent atrial fibrillation  Continue telemetry monitoring  Continue Eliquis for CVA prophylaxis  Increase BB to Lopressor 25 mg BID  Continue CCB, BB, Eliquis, ASA  Assess response and adjust medical therapy as BP permits.           VTE Risk Mitigation (From admission, onward)         Ordered     apixaban tablet 10 mg  2 times daily      05/31/20 4837                Thank you for your consult. I will follow-up with patient. Please contact us if you have any additional questions.    VANE Gee  Cardiology   [facility]

## 2020-06-01 NOTE — PLAN OF CARE
Patient updated on plan of care, no questions at this time, will continue to monitor Jesus Nolan 06/01/2020 4:35 PM

## 2020-06-01 NOTE — ASSESSMENT & PLAN NOTE
Pt taking ASA only  Is not prescribed a STATIN  Pt is debilitated and transfers from recliner to W/C  PT/OT eval and treat - needs max assist, will need SNF placement  [facility] placement pending

## 2020-06-01 NOTE — SUBJECTIVE & OBJECTIVE
Interval History: Pt expresses desire for discharge. SNF placement pending. No significant complaints voiced by patient. Advised of need for wound care and Cardiology consult.     Review of Systems   Constitutional: Positive for activity change. Negative for appetite change, chills, diaphoresis, fatigue and fever.   HENT: Negative.    Eyes: Negative for redness and visual disturbance.   Respiratory: Positive for shortness of breath (with exertion). Negative for cough and wheezing.    Cardiovascular: Positive for leg swelling. Negative for chest pain and palpitations.   Gastrointestinal: Negative for abdominal pain, diarrhea, nausea and vomiting.   Genitourinary:        Incontinent of urine   Musculoskeletal:        Debility - W/C bound   Skin: Positive for color change and wound.   Neurological: Positive for tremors and weakness. Negative for dizziness, facial asymmetry, speech difficulty and light-headedness.   Psychiatric/Behavioral: Negative for confusion.     Objective:     Vital Signs (Most Recent):  Temp: 98.3 °F (36.8 °C) (06/01/20 1134)  Pulse: 94 (06/01/20 1307)  Resp: 18 (06/01/20 1134)  BP: (!) 156/67 (06/01/20 1134)  SpO2: (!) 93 % (06/01/20 1134) Vital Signs (24h Range):  Temp:  [97.8 °F (36.6 °C)-98.3 °F (36.8 °C)] 98.3 °F (36.8 °C)  Pulse:  [] 94  Resp:  [18] 18  SpO2:  [93 %-98 %] 93 %  BP: (116-156)/(64-70) 156/67     Weight: 93.1 kg (205 lb 4 oz)  Body mass index is 30.75 kg/m².    Intake/Output Summary (Last 24 hours) at 6/1/2020 1523  Last data filed at 6/1/2020 1100  Gross per 24 hour   Intake 1208 ml   Output 800 ml   Net 408 ml      Physical Exam   Constitutional: He is oriented to person, place, and time. He appears well-developed and well-nourished.     Elder  male - unkempt in appearance   HENT:   Head: Normocephalic and atraumatic.   Eyes: Conjunctivae are normal. No scleral icterus.   Neck: Normal range of motion. Neck supple.   Cardiovascular: Normal heart sounds. An  irregular rhythm present. Tachycardia present. Exam reveals no gallop and no friction rub.   No murmur heard.  Pulmonary/Chest: Effort normal and breath sounds normal.   Abdominal: Soft. Bowel sounds are normal. He exhibits no distension. There is no tenderness. There is no guarding.   obese   Musculoskeletal: He exhibits edema. He exhibits no tenderness.   Generalized weakness  Pitting edema to BLE with left being greater than the right   Neurological: He is alert and oriented to person, place, and time.   Skin: Skin is warm and dry.     Left lower leg appears slightly more erythematous with increased warm than right leg - more beefy erythema present to left leg    Right leg has erythema but appears chronic  There are chronic ulcerations with purulent drainage and various scabs    Skin excoriation that is open in places to the buttocks   Psychiatric: He has a normal mood and affect. His behavior is normal.   Vitals reviewed.      Significant Labs:   CBC:   Recent Labs   Lab 05/31/20  0445 06/01/20  0506   WBC 2.87*  2.87* 2.51*   HGB 7.2*  7.2* 8.8*   HCT 23.5*  23.5* 27.9*     182 182     CMP:   Recent Labs   Lab 05/31/20  0445 06/01/20  0506   * 136   K 4.0 3.7    103   CO2 27 27   GLU 93 91   BUN 14 12   CREATININE 0.8 0.9   CALCIUM 7.6* 7.7*   PROT 9.2* 9.7*   ALBUMIN 1.9* 2.0*   BILITOT 0.3 0.3   ALKPHOS 74 74   AST 19 19   ALT 15 15   ANIONGAP 4* 6*   EGFRNONAA >60 >60     All pertinent labs within the past 24 hours have been reviewed.    Significant Imaging: I have reviewed all pertinent imaging results/findings within the past 24 hours.

## 2020-06-01 NOTE — PROGRESS NOTES
[facility] Hospital Medicine  Progress Note    Patient Name: Jatin Kohler  MRN: [id number]  Patient Class: OP- Observation   Admission Date: 5/30/2020  Length of Stay: 0 days  Attending Physician: Rufus Cabral MD  Primary Care Provider: Addi Saldaña MD        Subjective:     Principal Problem:Venous stasis dermatitis of both lower extremities        HPI:  Pt is a 79 yo male with PMHx of CHF, HTN, PVD, Sleep apnea, Anemia due to chronic blood loss who presents due to worsened left leg swelling over the last week. Pt is W/C bound and has chronic wounds to the bilateral legs and to sacrum. According to daughter, he is noncompliant with medications. With the left leg swelling, pt has a oozing wound to the right medial leg and serous oozing from bilateral legs. Pt reports SOB with exertion and occasional cough but denies chest pain, fever, chills, abdominal pain, nausea/vomiting, diarrhea and constipation. Labs note WBC 3.75, H/H 8.2/26.1, glucose 148, , troponin 0.015 and lactate 3.1. Procalcitonin is normal and COVID negative. A venous US of LLE revealed and DVT to left femoral vein and greater saphenous vein. CXR - Enlarged heart with possible pleural fluid inferiorly. Pt is placed on Observation to initiate treatment for LLE DVT with concerns for bilateral leg cellulitis and a pressure ulcer to the sacral area. POA - Kenya Bo, pt's daughter.       Overview/Hospital Course:  Pt admitted with acute DVT to the left leg with swelling. Also, noted with BLE venous stasis dermatitis with associated cellulitis especially to the left lower leg. Pt is debilitated and has chronic wounds to the legs. He is wheelchair bound and has skin excoriations to the bilateral buttocks.  Also, with candida dermatitis to bilateral abdominal folds, topical nystatin in progress. IV Vanco and Zosyn deescalated to Augmentin. Also, topical miconazole and metronidazole bid to venous stasis dermatitis. PT/OT  evaluated the patient documented need for maximum assist and recommends SNF placement. Case management assisting.  6/1/2020 - Afib with RVR per Telemetry and Cardiology recommended metoprolol 25 mg bid along with diltiazem and Eliquis. Wound care saw the patient and offered recs for venous stasis dermatitis and suspected deep tissue injury with excoriation to the buttock    Interval History: Pt expresses desire for discharge. SNF placement pending. No significant complaints voiced by patient. Advised of need for wound care and Cardiology consult.     Review of Systems   Constitutional: Positive for activity change. Negative for appetite change, chills, diaphoresis, fatigue and fever.   HENT: Negative.    Eyes: Negative for redness and visual disturbance.   Respiratory: Positive for shortness of breath (with exertion). Negative for cough and wheezing.    Cardiovascular: Positive for leg swelling. Negative for chest pain and palpitations.   Gastrointestinal: Negative for abdominal pain, diarrhea, nausea and vomiting.   Genitourinary:        Incontinent of urine   Musculoskeletal:        Debility - W/C bound   Skin: Positive for color change and wound.   Neurological: Positive for tremors and weakness. Negative for dizziness, facial asymmetry, speech difficulty and light-headedness.   Psychiatric/Behavioral: Negative for confusion.     Objective:     Vital Signs (Most Recent):  Temp: 98.3 °F (36.8 °C) (06/01/20 1134)  Pulse: 94 (06/01/20 1307)  Resp: 18 (06/01/20 1134)  BP: (!) 156/67 (06/01/20 1134)  SpO2: (!) 93 % (06/01/20 1134) Vital Signs (24h Range):  Temp:  [97.8 °F (36.6 °C)-98.3 °F (36.8 °C)] 98.3 °F (36.8 °C)  Pulse:  [] 94  Resp:  [18] 18  SpO2:  [93 %-98 %] 93 %  BP: (116-156)/(64-70) 156/67     Weight: 93.1 kg (205 lb 4 oz)  Body mass index is 30.75 kg/m².    Intake/Output Summary (Last 24 hours) at 6/1/2020 1523  Last data filed at 6/1/2020 1100  Gross per 24 hour   Intake 1208 ml   Output 800 ml    Net 408 ml      Physical Exam   Constitutional: He is oriented to person, place, and time. He appears well-developed and well-nourished.     Elder  male - unkempt in appearance   HENT:   Head: Normocephalic and atraumatic.   Eyes: Conjunctivae are normal. No scleral icterus.   Neck: Normal range of motion. Neck supple.   Cardiovascular: Normal heart sounds. An irregular rhythm present. Tachycardia present. Exam reveals no gallop and no friction rub.   No murmur heard.  Pulmonary/Chest: Effort normal and breath sounds normal.   Abdominal: Soft. Bowel sounds are normal. He exhibits no distension. There is no tenderness. There is no guarding.   obese   Musculoskeletal: He exhibits edema. He exhibits no tenderness.   Generalized weakness  Pitting edema to BLE with left being greater than the right   Neurological: He is alert and oriented to person, place, and time.   Skin: Skin is warm and dry.     Left lower leg appears slightly more erythematous with increased warm than right leg - more beefy erythema present to left leg    Right leg has erythema but appears chronic  There are chronic ulcerations with purulent drainage and various scabs    Skin excoriation that is open in places to the buttocks   Psychiatric: He has a normal mood and affect. His behavior is normal.   Vitals reviewed.      Significant Labs:   CBC:   Recent Labs   Lab 05/31/20  0445 06/01/20  0506   WBC 2.87*  2.87* 2.51*   HGB 7.2*  7.2* 8.8*   HCT 23.5*  23.5* 27.9*     182 182     CMP:   Recent Labs   Lab 05/31/20  0445 06/01/20  0506   * 136   K 4.0 3.7    103   CO2 27 27   GLU 93 91   BUN 14 12   CREATININE 0.8 0.9   CALCIUM 7.6* 7.7*   PROT 9.2* 9.7*   ALBUMIN 1.9* 2.0*   BILITOT 0.3 0.3   ALKPHOS 74 74   AST 19 19   ALT 15 15   ANIONGAP 4* 6*   EGFRNONAA >60 >60     All pertinent labs within the past 24 hours have been reviewed.    Significant Imaging: I have reviewed all pertinent imaging results/findings within  the past 24 hours.      Assessment/Plan:      * Venous stasis dermatitis of both lower extremities with associated cellulitis  LLE appears acutely infected - Vancomycin and Zosyn initially  Vanc/Zosyn deescalated to Augmentin  Venous stasis dermatitis concerning for anaerobe/fungal infection - metronidazole and nystatin topical  Wound care following      Acute on chronic anemia  No signs of active bleeding - no melena, no hematochezia, hematemesis  Hgb/Hct 8.2/26.1 >>>> 7.2/23.5>>>> 8.8/27.9 after 1 unit of blood  Type and Cross match and transfuse 1 unit PrBCs      Debilitated  Will need SNF placement - per PT/OT will need maximum assist   to assist with placement      Chronic diastolic heart failure  Appears compensated  Will continue oral lasix  Strict I & O  Cardiac diet      Acute deep vein thrombosis (DVT) of femoral vein of left lower extremity  According to daughter, pt has been on Coumadin in the past but when placed on Hospice the medication was discontinued  Initially placed on heparin then converted to Eliquis  Eliquis 10 mg bid x 14 doses then 5 mg bid        History of cerebrovascular accident (CVA) with residual deficit  Pt taking ASA only  Is not prescribed a STATIN  Pt is debilitated and transfers from recliner to W/C  PT/OT eval and treat - needs max assist, will need SNF placement  [facility] placement pending      Mixed restrictive and obstructive lung disease  Supplemental oxygen to maintain sats > 92 %  DuoNebs prn      Permanent atrial fibrillation  Initially heart rate 100 >>> 125  Continue Telemetry monitoring  Resume diltiazem and ASA>>>> metoprolol added  Pt is not prescribed anticoagulation>>>> Eliquis started for DVT  Cardiology consulted - continue diltiazem, added metoprolol 25 mg bid        VTE Risk Mitigation (From admission, onward)         Ordered     apixaban tablet 10 mg  2 times daily      05/31/20 1149                      Elsa Perez NP  Department  of [facility] Medicine   [facility]

## 2020-06-01 NOTE — PLAN OF CARE
Recommendations    Recommendation:   1. Add Arginaid 1x/day to diet order  2. RD to f/u  Goals: Pt to consume >50% of meals + arginaid by RD f/u  Nutrition Goal Status: new  Communication of RD Recs: (POC, sticky note)

## 2020-06-01 NOTE — ASSESSMENT & PLAN NOTE
Continue telemetry monitoring  Continue Eliquis for CVA prophylaxis  Increase BB to Lopressor 25 mg BID  Continue CCB, BB, Eliquis, ASA  Assess response and adjust medical therapy as BP permits.

## 2020-06-01 NOTE — ASSESSMENT & PLAN NOTE
Initially heart rate 100 >>> 125  Continue Telemetry monitoring  Resume diltiazem and ASA>>>> metoprolol added  Pt is not prescribed anticoagulation>>>> Eliquis started for DVT  Cardiology consulted - continue diltiazem, added metoprolol 25 mg bid

## 2020-06-01 NOTE — PLAN OF CARE
Ongoing (interventions implemented as appropriate)  Pt confused at times  VSS  Pt able to make needs known.  Pt remained afebrile throughout this shift.   Pt remained free of falls this shift.   Pt denies pain this shift.  Plan of care reviewed. Patient verbalizes understanding.   Pt refuses to be turned.  Frequent weight shifting encouraged.  Patient afib sinus rhythm on monitor.   Bed low, side rails up x 2, wheels locked, call light in reach.   Hourly rounding completed.   Will continue to monitor.

## 2020-06-01 NOTE — ASSESSMENT & PLAN NOTE
LLE appears acutely infected - Vancomycin and Zosyn initially  Vanc/Zosyn deescalated to Augmentin  Venous stasis dermatitis concerning for anaerobe/fungal infection - metronidazole and nystatin topical  Wound care following

## 2020-06-01 NOTE — PROGRESS NOTES
"  [facility] -   Adult Nutrition  Progress Note    SUMMARY     Recommendations    Recommendation:   1. Add Arginaid 1x/day to diet order  2. RD to f/u  Goals: Pt to consume >50% of meals + arginaid by RD f/u  Nutrition Goal Status: new  Communication of RD Recs: (POC, sticky note)    Reason for Assessment    Reason For Assessment: identified at risk by screening criteria  Diagnosis: infection/sepsis  Relevant Medical History: HTN, HLD, CHF, PVD  General Information Comments: RD assessed pt due to chronic wounds. He also has SOB and edema. He reported good meal intake of cardiac diet, but couldn't recall what he ate. He appears well nourished and is good natured.   Nutrition Discharge Planning: cardiac diet    Nutrition Risk Screen    Nutrition Risk Screen: large or nonhealing wound, burn or pressure injury    Nutrition/Diet History    Typical Food/Fluid Intake: Pt reports good appetite, but says that he is not a big eater in general.  No known food allergies.    Anthropometrics    Temp: 98.3 °F (36.8 °C)  Height Method: Stated  Height: 5' 8.5" (174 cm)  Height (inches): 68.5 in  Weight Method: Bed Scale  Weight: 93.1 kg (205 lb 4 oz)  Weight (lb): 205.25 lb  Ideal Body Weight (IBW), Male: 157 lb  BMI (Calculated): 30.8  BMI Grade: 30 - 34.9- obesity - grade I       Lab/Procedures/Meds  Pertinent Labs: reviewed    Lab Results   Component Value Date     06/01/2020    K 3.7 06/01/2020     06/01/2020    CO2 27 06/01/2020    BUN 12 06/01/2020    CREATININE 0.9 06/01/2020    CALCIUM 7.7 (L) 06/01/2020    ANIONGAP 6 (L) 06/01/2020    ESTGFRAFRICA >60 06/01/2020    EGFRNONAA >60 06/01/2020     Lab Results   Component Value Date    CALCIUM 7.7 (L) 06/01/2020    PHOS 3.7 05/31/2020     Lab Results   Component Value Date    ALBUMIN 2.0 (L) 06/01/2020     Lab Results   Component Value Date    HGBA1C 6.3 (H) 03/19/2015     No results for input(s): POCTGLUCOSE in the last 24 hours.  Pertinent " Medications: reviewed    Physical Findings/Assessment      Pressure injury, suspected DTPI    Estimated/Assessed Needs    Weight Used For Calorie Calculations: 93.1 kg (205 lb 4 oz)  Energy Calorie Requirements (kcal): 1947- 2272  Energy Need Method: (x 1.2-1.4)  Protein Requirements: 116- 139g  Weight Used For Protein Calculations: 93.1 kg (205 lb 4 oz)     Estimated Fluid Requirement Method: RDA Method(or per MD)  RDA Method (mL): 1947       Nutrition Prescription Ordered    Current Diet Order: cardiac    Evaluation of Received Nutrient/Fluid Intake       Intake/Output Summary (Last 24 hours) at 6/1/2020 1304  Last data filed at 6/1/2020 1100  Gross per 24 hour   Intake 1208 ml   Output 800 ml   Net 408 ml       % Intake of Estimated Energy Needs: 50 - 75 %  % Meal Intake: 50 - 75 %    Nutrition Risk    1x week     Assessment and Plan    Nutrition Problem  Increased Nutrient Needs- Protein    Related to (etiology):   Increased demand for nutrient- wound healing, cellulitis    Signs and Symptoms (as evidenced by):   delayed wound healing    Interventions:  Medical food supplement    Nutrition Diagnosis Status:   New    Monitor and Evaluation    Food and Nutrient Intake: food and beverage intake  Food and Nutrient Adminstration: diet order  Anthropometric Measurements: weight  Biochemical Data, Medical Tests and Procedures: electrolyte and renal panel, glucose/endocrine profile  Nutrition-Focused Physical Findings: skin     Malnutrition Assessment         No wasting evident, but pt does have edema. He reports good typical oral intake and has lost a small arount of weight, not enough to meet malnutrition criteria.   Nutrition Follow-Up    RD Follow-up?: Yes    Thanks!  Laura Guerrero MPH RD LDN

## 2020-06-01 NOTE — HPI
Jatin Kohler is a 80 year old male who presented to [facility] due to worsening left leg swelling over the past week on 5/30/20. His current medical conditions include CHF, HTN, PVD, RAQUEL, HTN, HLP, Anemia, CVA. Patient is wheelchair bound and has chronic wounds to bilateral legs and sacrum. He was placed in observation under the care of hospital medicine. ED workup revealed LLE Venous US +DVT of Left femoral vein and greater saphenous vein. He was started on Eliquis for treatment of acute DVT. Cardiology consulted on 6/1/20 to assist with medical management of new onset AFIB/RVR. Chart reviewed, patient seen and examined. ECHO from 1/2020 revealed EF 60-65%, DD. Denies any cardiac complaints today on exam. NO chest pain or anginal equivalents. No shortness of breath, HSU or palpitations. Trace LE edema today. Optimize BB therapy today and assess response. Further recs to follow.

## 2020-06-01 NOTE — CONSULTS
Consulted to this 80 year old male patient for present on admission wounds. PMHx of CHF, HTN, PVD, Sleep apnea, Anemia due to chronic blood loss who presents due to worsened left leg swelling over the last week. Pt is W/C bound and has chronic wounds to the bilateral legs and to sacrum. He is awake and alert, assessment performed. Patient with venous stasis changes to BLE includin hemosiderin staining, dry flaky skin, and ulcerations. Edema seems to be mild at this time. Scattered, intact scabs noted to BLE. Larger venous stasis ulcer noted to right calf measuring 2.5x2cm with moist red and yellow full thickness tissue loss. Cleansed with saline and gauze. Painted periwound with cavilon. Hydrofiber applied to wound bed and covered with foam dressing. Sween cream applied to the rest of BLE. Recommend continued use of topical nystatin and metronidazole. May consider compression therapy if edema/weeping continue (no weeping noted currently). No signs of acute infection noted. Patient then turned to left side independently. Widespread purple and red discoloration noted to bilateral lower buttocks and thighs with scattered areas of partial and full thickness tissue loss noted within. Suspicious for DTPI. Areas of excoriation also noted within suspected DTPI. Cleansed all with bath wipes and applied thin layer of critic aide paste (black top) to cover. Patient already on waffle overlay mattress. Will follow closely.    Venous stasis ulcer to right calf:  1. Cleanse with saline  2. Pat dry  3. Paint periwound with cavilon  4. Apply hydrofiber to cover wound bed  5. Secure with foam dressing  6. Change every 5 days or prn excess drainage    Suspected DTPI to bilateral buttocks and thighs:  1. Cleanse with bath wipes  2. Pat dry  3. Apply critic aide paste to cover  4. Perform twice daily and with episodes of incontinence  5. Maintain offloading    Skin Care Precautions / Pressure Injury Prevention:  1.  "Follow "Guidelines for Prevention of Pressure Ulcers in At Risk Patients"  These guidelines can be found on the [facility] Intr[facility]et by searching "Wound Care / Ostomy Resources"  2. Document wound assessment in [facility] using guidelines in Taty's "Assessment : Wound" procedure  3. Limit the amount of linen/underpad between patient and mattress surface to ONE fitted sheet and ONE covidien underpad - NO DIAPERS  4. Obtain Bath Wipes for providing marti care - avoid the use of wash cloths to areas affected by IAD.  5. Apply Moisture Barrier Paste to perineal / perirectal areas in a thin even layer to clean dry skin BID and after each episode of pericare  6. Apply sween 24 moisturizer cream to all dry skin after daily bath and prn  7. Obtain foam wedge from materials management to assist with maintaining proper position changes at least q 2hours and document actual position in EPIC q 2hours  8. Elevate heels off mattress on 2 separate pillows placed lengthwise under each leg supporting the leg from knee to ankle.  Document in EPIC flow sheet every 2 hours.  9. .Do NOT elevate HOB greater than 30 degrees unless contraindicated.  10. Remove SCD/Plexi Pulses/SIENA's every 12 hours for 30 minutes and assess skin underneath these devices for breakdown        "

## 2020-06-01 NOTE — ASSESSMENT & PLAN NOTE
No signs of active bleeding - no melena, no hematochezia, hematemesis  Hgb/Hct 8.2/26.1 >>>> 7.2/23.5>>>> 8.8/27.9 after 1 unit of blood  Type and Cross match and transfuse 1 unit PrBCs

## 2020-06-02 LAB
ALBUMIN SERPL BCP-MCNC: 2.1 G/DL (ref 3.5–5.2)
ALP SERPL-CCNC: 73 U/L (ref 55–135)
ALT SERPL W/O P-5'-P-CCNC: 18 U/L (ref 10–44)
ANION GAP SERPL CALC-SCNC: 3 MMOL/L (ref 8–16)
AST SERPL-CCNC: 26 U/L (ref 10–40)
BASOPHILS # BLD AUTO: 0 K/UL (ref 0–0.2)
BASOPHILS NFR BLD: 0 % (ref 0–1.9)
BILIRUB SERPL-MCNC: 0.3 MG/DL (ref 0.1–1)
BUN SERPL-MCNC: 15 MG/DL (ref 8–23)
CALCIUM SERPL-MCNC: 7.8 MG/DL (ref 8.7–10.5)
CHLORIDE SERPL-SCNC: 104 MMOL/L (ref 95–110)
CO2 SERPL-SCNC: 25 MMOL/L (ref 23–29)
CREAT SERPL-MCNC: 0.9 MG/DL (ref 0.5–1.4)
DIFFERENTIAL METHOD: ABNORMAL
EOSINOPHIL # BLD AUTO: 0.1 K/UL (ref 0–0.5)
EOSINOPHIL NFR BLD: 3.4 % (ref 0–8)
ERYTHROCYTE [DISTWIDTH] IN BLOOD BY AUTOMATED COUNT: 17.5 % (ref 11.5–14.5)
EST. GFR  (AFRICAN AMERICAN): >60 ML/MIN/1.73 M^2
EST. GFR  (NON AFRICAN AMERICAN): >60 ML/MIN/1.73 M^2
GLUCOSE SERPL-MCNC: 102 MG/DL (ref 70–110)
HCT VFR BLD AUTO: 29.3 % (ref 40–54)
HGB BLD-MCNC: 9.2 G/DL (ref 14–18)
IMM GRANULOCYTES # BLD AUTO: 0.01 K/UL (ref 0–0.04)
IMM GRANULOCYTES NFR BLD AUTO: 0.3 % (ref 0–0.5)
LYMPHOCYTES # BLD AUTO: 0.8 K/UL (ref 1–4.8)
LYMPHOCYTES NFR BLD: 28.9 % (ref 18–48)
MCH RBC QN AUTO: 29.5 PG (ref 27–31)
MCHC RBC AUTO-ENTMCNC: 31.4 G/DL (ref 32–36)
MCV RBC AUTO: 94 FL (ref 82–98)
MONOCYTES # BLD AUTO: 0.2 K/UL (ref 0.3–1)
MONOCYTES NFR BLD: 6.9 % (ref 4–15)
NEUTROPHILS # BLD AUTO: 1.8 K/UL (ref 1.8–7.7)
NEUTROPHILS NFR BLD: 60.5 % (ref 38–73)
NRBC BLD-RTO: 0 /100 WBC
OVALOCYTES BLD QL SMEAR: ABNORMAL
PLATELET # BLD AUTO: 193 K/UL (ref 150–350)
PMV BLD AUTO: 9.5 FL (ref 9.2–12.9)
POIKILOCYTOSIS BLD QL SMEAR: SLIGHT
POTASSIUM SERPL-SCNC: 3.7 MMOL/L (ref 3.5–5.1)
PROT SERPL-MCNC: 10.2 G/DL (ref 6–8.4)
RBC # BLD AUTO: 3.12 M/UL (ref 4.6–6.2)
SODIUM SERPL-SCNC: 132 MMOL/L (ref 136–145)
WBC # BLD AUTO: 2.91 K/UL (ref 3.9–12.7)

## 2020-06-02 PROCEDURE — 97530 THERAPEUTIC ACTIVITIES: CPT | Mod: HCNC

## 2020-06-02 PROCEDURE — 85025 COMPLETE CBC W/AUTO DIFF WBC: CPT | Mod: HCNC

## 2020-06-02 PROCEDURE — 80053 COMPREHEN METABOLIC PANEL: CPT | Mod: HCNC

## 2020-06-02 PROCEDURE — 99225 PR SUBSEQUENT OBSERVATION CARE,LEVEL II: ICD-10-PCS | Mod: HCNC,,, | Performed by: INTERNAL MEDICINE

## 2020-06-02 PROCEDURE — 99225 PR SUBSEQUENT OBSERVATION CARE,LEVEL II: CPT | Mod: HCNC,,, | Performed by: INTERNAL MEDICINE

## 2020-06-02 PROCEDURE — 25000003 PHARM REV CODE 250: Mod: HCNC | Performed by: NURSE PRACTITIONER

## 2020-06-02 PROCEDURE — 94640 AIRWAY INHALATION TREATMENT: CPT | Mod: HCNC

## 2020-06-02 PROCEDURE — G0378 HOSPITAL OBSERVATION PER HR: HCPCS | Mod: HCNC

## 2020-06-02 PROCEDURE — 97535 SELF CARE MNGMENT TRAINING: CPT | Mod: HCNC

## 2020-06-02 PROCEDURE — 94761 N-INVAS EAR/PLS OXIMETRY MLT: CPT | Mod: HCNC

## 2020-06-02 PROCEDURE — 36415 COLL VENOUS BLD VENIPUNCTURE: CPT | Mod: HCNC

## 2020-06-02 PROCEDURE — 25000242 PHARM REV CODE 250 ALT 637 W/ HCPCS: Mod: HCNC | Performed by: NURSE PRACTITIONER

## 2020-06-02 RX ORDER — LORAZEPAM 1 MG/1
1 TABLET ORAL 2 TIMES DAILY PRN
Status: DISCONTINUED | OUTPATIENT
Start: 2020-06-02 | End: 2020-06-03 | Stop reason: HOSPADM

## 2020-06-02 RX ADMIN — APIXABAN 10 MG: 2.5 TABLET, FILM COATED ORAL at 08:06

## 2020-06-02 RX ADMIN — LORAZEPAM 1 MG: 1 TABLET ORAL at 11:06

## 2020-06-02 RX ADMIN — ASPIRIN 81 MG: 81 TABLET, COATED ORAL at 09:06

## 2020-06-02 RX ADMIN — PANTOPRAZOLE SODIUM 40 MG: 40 TABLET, DELAYED RELEASE ORAL at 09:06

## 2020-06-02 RX ADMIN — FUROSEMIDE 40 MG: 40 TABLET ORAL at 09:06

## 2020-06-02 RX ADMIN — ANTI-FUNGAL POWDER MICONAZOLE NITRATE TALC FREE: 1.42 POWDER TOPICAL at 11:06

## 2020-06-02 RX ADMIN — METRONIDAZOLE: 10 GEL TOPICAL at 03:06

## 2020-06-02 RX ADMIN — METOPROLOL TARTRATE 25 MG: 25 TABLET ORAL at 09:06

## 2020-06-02 RX ADMIN — APIXABAN 10 MG: 2.5 TABLET, FILM COATED ORAL at 09:06

## 2020-06-02 RX ADMIN — OXYBUTYNIN CHLORIDE 10 MG: 5 TABLET, EXTENDED RELEASE ORAL at 09:06

## 2020-06-02 RX ADMIN — AMOXICILLIN AND CLAVULANATE POTASSIUM 1 TABLET: 875; 125 TABLET, FILM COATED ORAL at 09:06

## 2020-06-02 RX ADMIN — METRONIDAZOLE: 10 GEL TOPICAL at 11:06

## 2020-06-02 RX ADMIN — IPRATROPIUM BROMIDE AND ALBUTEROL SULFATE 3 ML: .5; 3 SOLUTION RESPIRATORY (INHALATION) at 07:06

## 2020-06-02 RX ADMIN — MICONAZOLE NITRATE: 2 OINTMENT TOPICAL at 09:06

## 2020-06-02 RX ADMIN — MICONAZOLE NITRATE: 2 OINTMENT TOPICAL at 08:06

## 2020-06-02 RX ADMIN — AMOXICILLIN AND CLAVULANATE POTASSIUM 1 TABLET: 875; 125 TABLET, FILM COATED ORAL at 08:06

## 2020-06-02 RX ADMIN — ANTI-FUNGAL POWDER MICONAZOLE NITRATE TALC FREE: 1.42 POWDER TOPICAL at 08:06

## 2020-06-02 RX ADMIN — DILTIAZEM HYDROCHLORIDE 120 MG: 120 CAPSULE, COATED, EXTENDED RELEASE ORAL at 09:06

## 2020-06-02 RX ADMIN — METOPROLOL TARTRATE 25 MG: 25 TABLET ORAL at 08:06

## 2020-06-02 NOTE — PLAN OF CARE
Patient AAOx4. VSS.  Patient remained afebrile throughout the shift.  Heart rate closely monitored   Patient AFIB on monitor.  Patient remained free of falls this shift, bed alarm on  Plan of care reviewed.  Patient verbalized understanding.  Patient moving/turning with assistance  Frequent weight shifting encouraged.  Bed low, siderails up x2, wheels locked, call light in reach.  Patient instructed to call for assistance.  Hourly rounding completed.  Will continue to monitor.

## 2020-06-02 NOTE — SUBJECTIVE & OBJECTIVE
Interval History: Pt verbalizes desire for discharge. Encouraged continued PT/OT and skin care at the skilled facility. Spoke with pt's daughter, Luisa, regarding stable status.     Review of Systems   Constitutional: Positive for activity change. Negative for appetite change, chills, diaphoresis, fatigue and fever.   HENT: Negative.    Eyes: Negative for redness and visual disturbance.   Respiratory: Positive for shortness of breath (with exertion). Negative for cough and wheezing.    Cardiovascular: Positive for leg swelling. Negative for chest pain and palpitations.   Gastrointestinal: Negative for abdominal pain, diarrhea, nausea and vomiting.   Genitourinary:        Incontinent of urine   Musculoskeletal:        Debility - W/C bound   Skin: Positive for color change and wound.   Neurological: Positive for tremors and weakness. Negative for dizziness, facial asymmetry, speech difficulty and light-headedness.   Psychiatric/Behavioral: Negative for confusion.     Objective:     Vital Signs (Most Recent):  Temp: 97 °F (36.1 °C) (06/02/20 1143)  Pulse: 75 (06/02/20 1500)  Resp: 19 (06/02/20 1143)  BP: 130/83 (06/02/20 1143)  SpO2: (!) 94 % (06/02/20 1143) Vital Signs (24h Range):  Temp:  [96.7 °F (35.9 °C)-98.3 °F (36.8 °C)] 97 °F (36.1 °C)  Pulse:  [] 75  Resp:  [18-20] 19  SpO2:  [94 %-98 %] 94 %  BP: (112-167)/(64-88) 130/83     Weight: 94.4 kg (208 lb 1.8 oz)  Body mass index is 31.18 kg/m².    Intake/Output Summary (Last 24 hours) at 6/2/2020 1511  Last data filed at 6/2/2020 0400  Gross per 24 hour   Intake 236 ml   Output 400 ml   Net -164 ml      Physical Exam   Constitutional: He is oriented to person, place, and time. He appears well-developed and well-nourished.     Elder  male - unkempt in appearance   HENT:   Head: Normocephalic and atraumatic.   Eyes: Conjunctivae are normal. No scleral icterus.   Neck: Normal range of motion. Neck supple.   Cardiovascular: Normal heart sounds. An  irregular rhythm present. Tachycardia present. Exam reveals no gallop and no friction rub.   No murmur heard.  Pulmonary/Chest: Effort normal and breath sounds normal.   Abdominal: Soft. Bowel sounds are normal. He exhibits no distension. There is no tenderness. There is no guarding.   obese   Musculoskeletal: He exhibits no edema or tenderness.   Generalized weakness  Edema to BLE mostly resolved   Neurological: He is alert and oriented to person, place, and time.   Skin: Skin is warm and dry.     Extent of skin erythema to BLE has decreased      There are chronic ulcerations with purulent drainage and various scabs    Skin excoriation that is open in places to the buttocks   Psychiatric: His behavior is normal. His mood appears anxious.   Vitals reviewed.      Significant Labs:   CBC:   Recent Labs   Lab 06/01/20  0506 06/02/20  0422   WBC 2.51* 2.91*   HGB 8.8* 9.2*   HCT 27.9* 29.3*    193     CMP:   Recent Labs   Lab 06/01/20  0506 06/02/20  0422    132*   K 3.7 3.7    104   CO2 27 25   GLU 91 102   BUN 12 15   CREATININE 0.9 0.9   CALCIUM 7.7* 7.8*   PROT 9.7* 10.2*   ALBUMIN 2.0* 2.1*   BILITOT 0.3 0.3   ALKPHOS 74 73   AST 19 26   ALT 15 18   ANIONGAP 6* 3*   EGFRNONAA >60 >60     All pertinent labs within the past 24 hours have been reviewed.    Significant Imaging: I have reviewed all pertinent imaging results/findings within the past 24 hours.

## 2020-06-02 NOTE — PLAN OF CARE
CM met with patient at the bedside to discuss the discharge plan.  CM discussed SNF at [facility].  He is agreeable to this plan.  [place] with [facility] will submit for authorization.     06/02/20 0999   Discharge Reassessment   Assessment Type Discharge Planning Reassessment   Provided patient/caregiver education on the expected discharge date and the discharge plan Yes   Do you have any problems affording any of your prescribed medications? No   Discharge Plan A Skilled Nursing Facility   DME Needed Upon Discharge  none   Patient choice form signed by patient/caregiver N/A   Anticipated Discharge Disposition SNF   Can the patient/caregiver answer the patient profile reliably? Yes, cognitively intact

## 2020-06-02 NOTE — PLAN OF CARE
PATIENT WOULD BENEFIT FROM CONT SKILLED OT INTERVENTION TO INCREASE SAFETY AND (I) WITH ALL LEVELS OF SELF CARE

## 2020-06-02 NOTE — PLAN OF CARE
Pt did not take all of his scheduled breathing treatment. He held it in his hand and said he didn't need it. Only about half the medication was given. Breath sounds diminished. He remains on Ra.

## 2020-06-02 NOTE — ASSESSMENT & PLAN NOTE
Continue telemetry monitoring  Continue Eliquis for CVA prophylaxis  Increase BB to Lopressor 25 mg BID  Continue CCB, BB, Eliquis, ASA  Assess response and adjust medical therapy as BP permits.     6/2  -Continue BB, CCB, Eliquis, ASA  -Can further optimize BB or CCB if needed for HR control  -Will sign off, please re-consult if needed.   -OP Cardiology follow up after discharge.

## 2020-06-02 NOTE — PROGRESS NOTES
Pt repositioned with wedge. Waffle mattress.  In place. Pt states his back hurts. Pt wanted back on his back.instructed pt he has a sore on his coccyx. Pt states he is fine. Heels elevated on bed with pillow. Will continue to reposition

## 2020-06-02 NOTE — PT/OT/SLP PROGRESS
Occupational Therapy      Patient Name:  Jatin Kohler   MRN:  [id number]    TREATMENT TIME:  [id number]    S:  PATIENT STATED HE WANTS TO GO HOME AND HIS FAMILY CAN (A) HIM AS NEEDED.    O:  PATIENT T/F'ED SUPINE > SIT > STAND WITH RW WITH MOD (A).  WHILE SEATED, PATIENT ATTEMPTED TO DOFF/DON SOCKS WITH (D).  PATIENT PERFORMED (B) SHLD FLEX SROM X10 REPS WITH GOAL OF INCREASED (L) SHLD FLEX AROM.  PATIENT PERFORMED AROM FOR BUE ALTERNATING PUNCHES AND  (B) HAND DIGIT FLEX/EXTEN FOR INCREASED ENDURANCE.     A:  PATIENT NOT SAFE TO RETURN HOME ALONE DUE TO DECREASED FUNC MOBILITY AND DECREASED SAFETY AND (I) WITH SELF CARE TASKS.    P:  CONT TO ADDRESS UNMET GOALS TO INCREASE PATIENT'S SAFETY AND (I) WITH SELF CARE TASKS.      CHARGES:  TA2        Minoo Padilla OT  6/1/2020

## 2020-06-02 NOTE — PT/OT/SLP PROGRESS
Physical Therapy  Treatment    Jatin Kohler   MRN: [id number]   Admitting Diagnosis: Venous stasis dermatitis of both lower extremities    PT Received On: 06/02/20  PT Start Time: 1104     PT Stop Time: 1120    PT Total Time (min): 16 min       Billable Minutes:  Therapeutic Activity 16 min    Treatment Type: Treatment  PT/PTA: PT     PTA Visit Number: 0       General Precautions: Standard, fall  Orthopedic Precautions: N/A   Braces: N/A    Spiritual, Cultural Beliefs, Pent[employer]alism Practices, Values that Affect Care: no    Subjective:  Communicated with Epic and nurse Chelsie prior to session.  Patient agreeable to participating with therapy.    Pain/Comfort  Pain Rating 1: 7/10  Location - Orientation 1: lower  Location 1: back  Pain Addressed 1: Nurse notified, Reposition, Distraction, Cessation of Activity  Pain Rating Post-Intervention 1: 7/10    Objective:   Patient found with: peripheral IV, telemetry    Functional Mobility:  Bed Mobility:    Rolling to left min assist, supine -> sit mod assist, seated scooting mod assist.    Transfers:   Sit <-> stand with RW with mod assist of 2 and verbal cues for hand placement and safe technique.    Gait:    Patient able to take several steps to bedside chair with RW with mod assist of 2.  Patient unsteady and unsafe (moving impulsively in attempt to get to chair quickly).    Balance:   Static Sit: FAIR-: Maintains without assist but inconsistent   Dynamic Sit: POOR: N/A  Static Stand: POOR: Needs MODERATE assist to maintain  Dynamic stand: POOR: Needs MOD (moderate) assist during gait     Therapeutic Activities and Exercises:  Patient participated in bed mobility, transfer training with RW, and gait training with RW (see above for details).  Patient performed seated BLE therapeutic exercises x 10 reps in all planes at edge of bed with CGA for balance/safety.  Encouraged patient to sit up in bedside chair as long as tolerated.     AM-PAC 6 CLICK MOBILITY  How much help from  another person does this patient currently need?   1 = Unable, Total/Dependent Assistance  2 = A lot, Maximum/Moderate Assistance  3 = A little, Minimum/Contact Guard/Supervision  4 = None, Modified [place]/Independent    Turning over in bed (including adjusting bedclothes, sheets and blankets)?: 3  Sitting down on and standing up from a chair with arms (e.g., wheelchair, bedside commode, etc.): 2  Moving from lying on back to sitting on the side of the bed?: 2  Moving to and from a bed to a chair (including a wheelchair)?: 2  Need to walk in hospital room?: 2  Climbing 3-5 steps with a railing?: 1  Basic Mobility Total Score: 12    AM-PAC Raw Score CMS G-Code Modifier Level of Impairment Assistance   6 % Total / Unable   7 - 9 CM 80 - 100% Maximal Assist   10 - 14 CL 60 - 80% Moderate Assist   15 - 19 CK 40 - 60% Moderate Assist   20 - 22 CJ 20 - 40% Minimal Assist   23 CI 1-20% SBA / CGA   24 CH 0% Independent/ Mod I     Patient left up in chair with all lines intact, call button in reach and chair alarm on.  Patient verbalized understanding of need to call nurse's station for assistance when ready to return to bed.    Assessment:  Patient continues to require mod assist for most functional mobility activities.  Would benefit from continued skilled PT services as per initial POC.    Rehab identified problem list/impairments: Rehab identified problem list/impairments: weakness, impaired endurance, impaired self care skills, impaired functional mobilty, gait instability, impaired balance, decreased coordination, decreased safety awareness, pain, decreased upper extremity function, decreased lower extremity function    Rehab potential is fair.    Activity tolerance: Fair    Discharge recommendations: Discharge Facility/Level of Care Needs: nursing facility, skilled     Barriers to discharge:      Equipment recommendations: Equipment Needed After Discharge: none     GOALS:   Multidisciplinary Problems      Physical Therapy Goals        Problem: Physical Therapy Goal    Goal Priority Disciplines Outcome Goal Variances Interventions   Physical Therapy Goal     PT, PT/OT Ongoing, Progressing     Description:  LTGs to be met within 7 days (6/7/20):  1. Patient will perform bed mobility with mod assist  2.  Patient will perform functional transfers with RW with mod assist  3. Patient will ambulate 20 feet with RW with mod assist and no gross LOB  4.  Patient will perform BLE therapeutic exercises 10 x 2 in all planes                    PLAN:    Patient to be seen 5 x/week  to address the above listed problems via gait training, therapeutic activities, therapeutic exercises  Plan of Care expires: 06/07/20  Plan of Care reviewed with: patient         Swapna Pino, PT, DPT  06/02/2020

## 2020-06-02 NOTE — PROGRESS NOTES
[facility] -   Cardiology  Progress Note    Patient Name: Jatin Kohler  MRN: [id number]  Admission Date: 5/30/2020  Hospital Length of Stay: 0 days  Code Status: Full Code   Attending Physician: Rufus Cabral MD   Primary Care Physician: Addi Saldaña MD  Expected Discharge Date:   Principal Problem:Venous stasis dermatitis of both lower extremities    Subjective:   HPI    Jatin Kohler is a 80 year old male who presented to [facility] due to worsening left leg swelling over the past week on 5/30/20. His current medical conditions include CHF, HTN, PVD, RAQUEL, HTN, HLP, Anemia, CVA. Patient is wheelchair bound and has chronic wounds to bilateral legs and sacrum. He was placed in observation under the care of hospital medicine. ED workup revealed LLE Venous US +DVT of Left femoral vein and greater saphenous vein. He was started on Eliquis for treatment of acute DVT. Cardiology consulted on 6/1/20 to assist with medical management of new onset AFIB/RVR. Chart reviewed, patient seen and examined. ECHO from 1/2020 revealed EF 60-65%, DD. Denies any cardiac complaints today on exam. NO chest pain or anginal equivalents. No shortness of breath, HSU or palpitations. Trace LE edema today. Optimize BB therapy today and assess response. Further recs to follow.         Hospital Course:   6/2/2020-Patient seen and examined in room, lying in bed. Remains in AFIB, rate improved today. Labs reviewed, H/H 9/29, K+ 3.7, Cr 0.9. Will sign off, please reconsult if needed.     Interval History: no acute issues noted o/n. Will sign off, please re-consult if needed.     Review of Systems   Constitution: Positive for malaise/fatigue.   HENT: Negative for hearing loss and hoarse voice.    Eyes: Negative for blurred vision and visual disturbance.   Cardiovascular: Positive for dyspnea on exertion and irregular heartbeat. Negative for chest pain, claudication, leg swelling, near-syncope, orthopnea, palpitations, paroxysmal  nocturnal dyspnea and syncope.   Respiratory: Negative for cough, hemoptysis, shortness of breath, sleep disturbances due to breathing, snoring and wheezing.    Endocrine: Negative for cold intolerance and heat intolerance.   Hematologic/Lymphatic: Bruises/bleeds easily (on eliquis).   Skin: Positive for poor wound healing. Negative for color change, dry skin and nail changes.   Musculoskeletal: Positive for arthritis, back pain and joint pain. Negative for myalgias.        Wheelchair bound   Gastrointestinal: Negative for bloating, abdominal pain, constipation, nausea and vomiting.   Genitourinary: Negative for dysuria, flank pain, hematuria and hesitancy.   Neurological: Positive for weakness. Negative for headaches, light-headedness, loss of balance, numbness and paresthesias.   Psychiatric/Behavioral: Negative for altered mental status.   Allergic/Immunologic: Negative for environmental allergies.     Objective:     Vital Signs (Most Recent):  Temp: 97 °F (36.1 °C) (06/02/20 1143)  Pulse: 110 (06/02/20 1143)  Resp: 19 (06/02/20 1143)  BP: 130/83 (06/02/20 1143)  SpO2: (!) 94 % (06/02/20 1143) Vital Signs (24h Range):  Temp:  [96.7 °F (35.9 °C)-98.3 °F (36.8 °C)] 97 °F (36.1 °C)  Pulse:  [] 110  Resp:  [18-20] 19  SpO2:  [94 %-98 %] 94 %  BP: (112-167)/(64-88) 130/83     Weight: 94.4 kg (208 lb 1.8 oz)  Body mass index is 31.18 kg/m².     SpO2: (!) 94 %  O2 Device (Oxygen Therapy): room air      Intake/Output Summary (Last 24 hours) at 6/2/2020 1252  Last data filed at 6/2/2020 0400  Gross per 24 hour   Intake 356 ml   Output 600 ml   Net -244 ml       Lines/Drains/Airways     Peripheral Intravenous Line                 Peripheral IV - Single Lumen 05/30/20 1420 20 G Right Forearm 2 days         Peripheral IV - Single Lumen 05/30/20 1716 20 G Right Antecubital 2 days                Physical Exam   Constitutional: He is oriented to person, place, and time. He appears well-developed and well-nourished. No  distress.   HENT:   Head: Normocephalic and atraumatic.   Eyes: Pupils are equal, round, and reactive to light.   Neck: Normal range of motion and full passive range of motion without pain. Neck supple. No JVD present.   Cardiovascular: Normal rate, S1 normal, S2 normal and intact distal pulses. An irregularly irregular rhythm present. PMI is not displaced. Exam reveals no distant heart sounds.   No murmur heard.  Pulses:       Radial pulses are 2+ on the right side, and 2+ on the left side.        Dorsalis pedis pulses are 2+ on the right side, and 2+ on the left side.   Remains in AFIB, improved rate    Pulmonary/Chest: Effort normal. No accessory muscle usage. No respiratory distress. He has no decreased breath sounds. He has no wheezes. He has no rales.   Abdominal: Soft. Bowel sounds are normal. He exhibits no distension. There is no tenderness.   Musculoskeletal: Normal range of motion. He exhibits edema (trace LE).        Right ankle: He exhibits swelling.        Left ankle: He exhibits swelling.   Trace BLE edema   Neurological: He is alert and oriented to person, place, and time.   Skin: Skin is warm and dry. He is not diaphoretic. No cyanosis. Nails show no clubbing.   Psychiatric: He has a normal mood and affect. His speech is normal and behavior is normal. Judgment and thought content normal. Cognition and memory are normal.   Nursing note and vitals reviewed.      Significant Labs:   BMP:   Recent Labs   Lab 06/01/20  0506 06/02/20  0422   GLU 91 102    132*   K 3.7 3.7    104   CO2 27 25   BUN 12 15   CREATININE 0.9 0.9   CALCIUM 7.7* 7.8*   , CBC   Recent Labs   Lab 06/01/20  0506 06/02/20  0422   WBC 2.51* 2.91*   HGB 8.8* 9.2*   HCT 27.9* 29.3*    193    and All pertinent lab results from the last 24 hours have been reviewed.    Significant Imaging: Echocardiogram:   2D echo with color flow doppler:   Results for orders placed or performed during the hospital encounter of 01/11/20    2D echo with color flow doppler   Result Value Ref Range    QEF 60 55 - 65    Diastolic Dysfunction Yes (A)     Est. PA Systolic Pressure 26.42     Narrative    Date of Procedure: 01/12/2020        TEST DESCRIPTION       Aorta: The aortic root is normal in size, measuring 2.2 cm at sinotubular junction and 3.3 cm at Sinuses of Valsalva.     Left Atrium: The left atrial volume index is severely enlarged, measuring 53.06 cc/m2.     Left Ventricle: The left ventricle is normal in size, with an end-diastolic diameter of 4.2 cm, and an end-systolic diameter of 2.7 cm. LV wall thickness is normal, with the septum measuring 1.4 cm and the posterior wall measuring 1.1 cm across. Relative   wall thickness was increased at 0.52, and the LV mass index was 105.3 g/m2 consistent with concentric remodeling. There are no regional wall motion abnormalities. Left ventricular systolic function appears normal. Visually estimated ejection fraction is   60-65%. The LV Doppler derived stroke volume equals 49.0 ccs.     Diastolic indices: E wave velocity 0.8 m/s, E/A ratio 2.1,  msec., E/e' ratio(avg) 10. There is diastolic dysfunction secondary to relaxation abnormality.     Right Atrium: The right atrium is normal in size, measuring 5.8 cm in length and 3.7 cm in width in the apical view.     Right Ventricle: The right ventricle is normal in size. Global right ventricular systolic function appears normal. Tricuspid annular plane systolic excursion (TAPSE) is .8 cm. The estimated PA systolic pressure is greater than 26 mmHg.     Aortic Valve:  The mean gradient obtained across the aortic valve is 9 mmHg.     Mitral Valve:  The pressure half time is 52 msec. The calculated mitral valve area is 4.23 cm2.     Intracavitary: There is no evidence of pericardial effusion, intracavity mass, thrombi, or vegetation.         CONCLUSIONS     1 - Severe left atrial enlargement.     2 - Concentric remodeling.     3 - No wall motion  abnormalities.     4 - Normal left ventricular systolic function (EF 60-65%).     5 - Impaired LV relaxation, normal LAP (grade 1 diastolic dysfunction).     6 - Normal right ventricular systolic function .     7 - The estimated PA systolic pressure is greater than 26 mmHg.             This document has been electronically    SIGNED BY: Johnson San MD On: 01/12/2020 12:09    and X-Ray: CXR: X-Ray Chest 1 View (CXR): No results found for this visit on 05/30/20. and X-Ray Chest PA and Lateral (CXR): No results found for this visit on 05/30/20.    Assessment and Plan:       * Venous stasis dermatitis of both lower extremities with associated cellulitis  Mgmt per primary team      Acute on chronic anemia  Monitor closely  Transfuse as needed per primary team    Chronic diastolic heart failure  Continue BB, CCB, Lasix  Add ACEi/ARB as BP tolerates    Acute deep vein thrombosis (DVT) of femoral vein of left lower extremity  On Eliquis for acute DVT      Permanent atrial fibrillation  Continue telemetry monitoring  Continue Eliquis for CVA prophylaxis  Increase BB to Lopressor 25 mg BID  Continue CCB, BB, Eliquis, ASA  Assess response and adjust medical therapy as BP permits.     6/2  -Continue BB, CCB, Eliquis, ASA  -Can further optimize BB or CCB if needed for HR control  -Will sign off, please re-consult if needed.   -OP Cardiology follow up after discharge.           VTE Risk Mitigation (From admission, onward)         Ordered     apixaban tablet 10 mg  2 times daily      05/31/20 1149              Will sign off, please re-consult if needed.     VANE Gee  Cardiology  [facility]

## 2020-06-02 NOTE — PT/OT/SLP PROGRESS
Occupational Therapy   Treatment    Name: Jatin Kohler  MRN: [id number]  Admitting Diagnosis:  Venous stasis dermatitis of both lower extremities       Recommendations:     Discharge Recommendations: nursing facility, skilled  Discharge Equipment Recommendations:  none  Barriers to discharge:  None    Assessment:     Jatin Kohler is a 80 y.o. male with a medical diagnosis of Venous stasis dermatitis of both lower extremities.  He presents with debility and generalized weakness. Performance deficits affecting function are weakness, impaired self care skills, impaired balance, decreased coordination, decreased safety awareness, impaired endurance, impaired functional mobilty, gait instability.     Rehab Prognosis:  Fair; patient would benefit from acute skilled OT services to address these deficits and reach maximum level of function.       Plan:     Patient to be seen 3 x/week to address the above listed problems via self-care/home management, therapeutic activities, therapeutic exercises  · Plan of Care Expires:    · Plan of Care Reviewed with: patient    Subjective     Pain/Comfort:  · Pain Rating 1: 7/10  · Location - Orientation 1: lower  · Location 1: back  · Pain Addressed 1: Nurse notified, Distraction, Cessation of Activity    Objective:     Communicated with: nurse and epic chart review prior to session.  Patient found HOB elevated with peripheral IV, telemetry upon OT entry to room.    General Precautions: Standard, fall   Orthopedic Precautions:N/A   Braces: N/A     Occupational Performance:     Bed Mobility:    · Patient completed Rolling/Turning to Left with  moderate assistance and 2 persons  · Patient completed Scooting/Bridging with moderate assistance and 2 persons  · Patient completed Supine to Sit with moderate assistance and 2 persons     Functional Mobility/Transfers:  · Patient completed Sit <> Stand Transfer with moderate assistance and of 2 persons  with  rolling walker   · Patient  completed Bed <> Chair Transfer using Step Transfer technique with moderate assistance and of 2 persons with rolling walker    Activities of Daily Living:  · Grooming: minimum assistance .  · Upper Body Dressing: maximal assistance .      AMPAC 6 Click ADL: 12    Treatment & Education:      Patient left up in chair with all lines intact, call button in reach, chair alarm on and nurse notifiedEducation:      GOALS:   Multidisciplinary Problems     Occupational Therapy Goals        Problem: Occupational Therapy Goal    Goal Priority Disciplines Outcome Interventions   Occupational Therapy Goal     OT, PT/OT Ongoing, Progressing    Description:  ot goals to be met by 6-7-20  Min a with ue dressing  Mod a with toileting  Pt will tolerate 1 set x 15 reps b ue rom exercise   Mod a with bsc t/f's                      Time Tracking:     OT Date of Treatment: 06/02/20  OT Start Time: 1041  OT Stop Time: 1104  OT Total Time (min): 23 min    Billable Minutes:Self Care/Home Management 8 minutes  Therapeutic Activity 15 minutes    Jane Soto OT  6/2/2020

## 2020-06-02 NOTE — PLAN OF CARE
Plan of care reviewed with patient. Patient verbalized understanding. Requires reinforcement.   Bed remains low and locked, side rails up x 2, call bell and belongings within reach.   Urinal at bedside and in reach.   Frequent repositioning encouraged to prevent pressure injury.    PIV intact. Cardiac monitor in place.  No c/o or questions at this time.   Will continue to monitor.

## 2020-06-02 NOTE — PLAN OF CARE
Bed Mobility min/mod A; transfers min/mod A - sit to/from stand with RW from elevated bed; progressed to side steps with RW and min/mod A for balance; Patient reported dizziness so unable to walk away from bed; Patient wants to go home but not safe and has little support during the day; rec snf or home with hhpt/ot BUT MUST have 24hr spv/A from family

## 2020-06-02 NOTE — SUBJECTIVE & OBJECTIVE
Interval History: no acute issues noted o/n. Will sign off, please re-consult if needed.     Review of Systems   Constitution: Positive for malaise/fatigue.   HENT: Negative for hearing loss and hoarse voice.    Eyes: Negative for blurred vision and visual disturbance.   Cardiovascular: Positive for dyspnea on exertion and irregular heartbeat. Negative for chest pain, claudication, leg swelling, near-syncope, orthopnea, palpitations, paroxysmal nocturnal dyspnea and syncope.   Respiratory: Negative for cough, hemoptysis, shortness of breath, sleep disturbances due to breathing, snoring and wheezing.    Endocrine: Negative for cold intolerance and heat intolerance.   Hematologic/Lymphatic: Bruises/bleeds easily (on eliquis).   Skin: Positive for poor wound healing. Negative for color change, dry skin and nail changes.   Musculoskeletal: Positive for arthritis, back pain and joint pain. Negative for myalgias.        Wheelchair bound   Gastrointestinal: Negative for bloating, abdominal pain, constipation, nausea and vomiting.   Genitourinary: Negative for dysuria, flank pain, hematuria and hesitancy.   Neurological: Positive for weakness. Negative for headaches, light-headedness, loss of balance, numbness and paresthesias.   Psychiatric/Behavioral: Negative for altered mental status.   Allergic/Immunologic: Negative for environmental allergies.     Objective:     Vital Signs (Most Recent):  Temp: 97 °F (36.1 °C) (06/02/20 1143)  Pulse: 110 (06/02/20 1143)  Resp: 19 (06/02/20 1143)  BP: 130/83 (06/02/20 1143)  SpO2: (!) 94 % (06/02/20 1143) Vital Signs (24h Range):  Temp:  [96.7 °F (35.9 °C)-98.3 °F (36.8 °C)] 97 °F (36.1 °C)  Pulse:  [] 110  Resp:  [18-20] 19  SpO2:  [94 %-98 %] 94 %  BP: (112-167)/(64-88) 130/83     Weight: 94.4 kg (208 lb 1.8 oz)  Body mass index is 31.18 kg/m².     SpO2: (!) 94 %  O2 Device (Oxygen Therapy): room air      Intake/Output Summary (Last 24 hours) at 6/2/2020 9827  Last data filed  at 6/2/2020 0400  Gross per 24 hour   Intake 356 ml   Output 600 ml   Net -244 ml       Lines/Drains/Airways     Peripheral Intravenous Line                 Peripheral IV - Single Lumen 05/30/20 1420 20 G Right Forearm 2 days         Peripheral IV - Single Lumen 05/30/20 1716 20 G Right Antecubital 2 days                Physical Exam   Constitutional: He is oriented to person, place, and time. He appears well-developed and well-nourished. No distress.   HENT:   Head: Normocephalic and atraumatic.   Eyes: Pupils are equal, round, and reactive to light.   Neck: Normal range of motion and full passive range of motion without pain. Neck supple. No JVD present.   Cardiovascular: Normal rate, S1 normal, S2 normal and intact distal pulses. An irregularly irregular rhythm present. PMI is not displaced. Exam reveals no distant heart sounds.   No murmur heard.  Pulses:       Radial pulses are 2+ on the right side, and 2+ on the left side.        Dorsalis pedis pulses are 2+ on the right side, and 2+ on the left side.   Remains in AFIB, improved rate    Pulmonary/Chest: Effort normal. No accessory muscle usage. No respiratory distress. He has no decreased breath sounds. He has no wheezes. He has no rales.   Abdominal: Soft. Bowel sounds are normal. He exhibits no distension. There is no tenderness.   Musculoskeletal: Normal range of motion. He exhibits edema (trace LE).        Right ankle: He exhibits swelling.        Left ankle: He exhibits swelling.   Trace BLE edema   Neurological: He is alert and oriented to person, place, and time.   Skin: Skin is warm and dry. He is not diaphoretic. No cyanosis. Nails show no clubbing.   Psychiatric: He has a normal mood and affect. His speech is normal and behavior is normal. Judgment and thought content normal. Cognition and memory are normal.   Nursing note and vitals reviewed.      Significant Labs:   BMP:   Recent Labs   Lab 06/01/20  0506 06/02/20  0422   GLU 91 102    132*    K 3.7 3.7    104   CO2 27 25   BUN 12 15   CREATININE 0.9 0.9   CALCIUM 7.7* 7.8*   , CBC   Recent Labs   Lab 06/01/20  0506 06/02/20  0422   WBC 2.51* 2.91*   HGB 8.8* 9.2*   HCT 27.9* 29.3*    193    and All pertinent lab results from the last 24 hours have been reviewed.    Significant Imaging: Echocardiogram:   2D echo with color flow doppler:   Results for orders placed or performed during the hospital encounter of 01/11/20   2D echo with color flow doppler   Result Value Ref Range    QEF 60 55 - 65    Diastolic Dysfunction Yes (A)     Est. PA Systolic Pressure 26.42     Narrative    Date of Procedure: 01/12/2020        TEST DESCRIPTION       Aorta: The aortic root is normal in size, measuring 2.2 cm at sinotubular junction and 3.3 cm at Sinuses of Valsalva.     Left Atrium: The left atrial volume index is severely enlarged, measuring 53.06 cc/m2.     Left Ventricle: The left ventricle is normal in size, with an end-diastolic diameter of 4.2 cm, and an end-systolic diameter of 2.7 cm. LV wall thickness is normal, with the septum measuring 1.4 cm and the posterior wall measuring 1.1 cm across. Relative   wall thickness was increased at 0.52, and the LV mass index was 105.3 g/m2 consistent with concentric remodeling. There are no regional wall motion abnormalities. Left ventricular systolic function appears normal. Visually estimated ejection fraction is   60-65%. The LV Doppler derived stroke volume equals 49.0 ccs.     Diastolic indices: E wave velocity 0.8 m/s, E/A ratio 2.1,  msec., E/e' ratio(avg) 10. There is diastolic dysfunction secondary to relaxation abnormality.     Right Atrium: The right atrium is normal in size, measuring 5.8 cm in length and 3.7 cm in width in the apical view.     Right Ventricle: The right ventricle is normal in size. Global right ventricular systolic function appears normal. Tricuspid annular plane systolic excursion (TAPSE) is .8 cm. The estimated PA  systolic pressure is greater than 26 mmHg.     Aortic Valve:  The mean gradient obtained across the aortic valve is 9 mmHg.     Mitral Valve:  The pressure half time is 52 msec. The calculated mitral valve area is 4.23 cm2.     Intracavitary: There is no evidence of pericardial effusion, intracavity mass, thrombi, or vegetation.         CONCLUSIONS     1 - Severe left atrial enlargement.     2 - Concentric remodeling.     3 - No wall motion abnormalities.     4 - Normal left ventricular systolic function (EF 60-65%).     5 - Impaired LV relaxation, normal LAP (grade 1 diastolic dysfunction).     6 - Normal right ventricular systolic function .     7 - The estimated PA systolic pressure is greater than 26 mmHg.             This document has been electronically    SIGNED BY: Johnson San MD On: 01/12/2020 12:09    and X-Ray: CXR: X-Ray Chest 1 View (CXR): No results found for this visit on 05/30/20. and X-Ray Chest PA and Lateral (CXR): No results found for this visit on 05/30/20.

## 2020-06-02 NOTE — PLAN OF CARE
Patient currently requires mod assist for bed mobility and mod assist x 2 for transfers to bedside chair with RW.

## 2020-06-02 NOTE — ASSESSMENT & PLAN NOTE
Pt taking ASA only  Is not prescribed a STATIN  Pt is debilitated and transfers from recliner to W/C  PT/OT eval and treat - needs max assist, will need SNF placement  [facility] placement pending - insurance authorization pending

## 2020-06-02 NOTE — PROGRESS NOTES
[facility] Hospital Medicine  Progress Note    Patient Name: Jatin Kohler  MRN: [id number]  Patient Class: OP- Observation   Admission Date: 5/30/2020  Length of Stay: 0 days  Attending Physician: Rufus Cabral MD  Primary Care Provider: Addi Saldaña MD        Subjective:     Principal Problem:Venous stasis dermatitis of both lower extremities        HPI:  Pt is a 79 yo male with PMHx of CHF, HTN, PVD, Sleep apnea, Anemia due to chronic blood loss who presents due to worsened left leg swelling over the last week. Pt is W/C bound and has chronic wounds to the bilateral legs and to sacrum. According to daughter, he is noncompliant with medications. With the left leg swelling, pt has a oozing wound to the right medial leg and serous oozing from bilateral legs. Pt reports SOB with exertion and occasional cough but denies chest pain, fever, chills, abdominal pain, nausea/vomiting, diarrhea and constipation. Labs note WBC 3.75, H/H 8.2/26.1, glucose 148, , troponin 0.015 and lactate 3.1. Procalcitonin is normal and COVID negative. A venous US of LLE revealed and DVT to left femoral vein and greater saphenous vein. CXR - Enlarged heart with possible pleural fluid inferiorly. Pt is placed on Observation to initiate treatment for LLE DVT with concerns for bilateral leg cellulitis and a pressure ulcer to the sacral area. POA - [place] Bo, pt's daughter.       Overview/Hospital Course:  Pt admitted with acute DVT to the left leg with swelling. Also, noted with BLE venous stasis dermatitis with associated cellulitis especially to the left lower leg. Pt is debilitated and has chronic wounds to the legs. He is wheelchair bound and has skin excoriations to the bilateral buttocks.  Also, with candida dermatitis to bilateral abdominal folds, topical nystatin in progress. IV Vanco and Zosyn deescalated to Augmentin. Also, topical miconazole and metronidazole bid to venous stasis dermatitis. PT/OT  evaluated the patient documented need for maximum assist and recommends SNF placement. Case management assisting.  6/1/2020 - Afib with RVR per Telemetry and Cardiology recommended metoprolol 25 mg bid along with diltiazem and Eliquis. Wound care saw the patient and offered recs for venous stasis dermatitis and suspected deep tissue injury with excoriation to the buttock.  6/2/2020 - Pt is medically stable. LLE venous stasis dermatitis with associated cellulitis much improved. Less swelling appreciated to left leg. Heart rate improved. Pt anxious to be discharged. [facility] placement is pending.     Interval History: Pt verbalizes desire for discharge. Encouraged continued PT/OT and skin care at the skilled facility. Spoke with pt's daughter, Luisa, regarding stable status.     Review of Systems   Constitutional: Positive for activity change. Negative for appetite change, chills, diaphoresis, fatigue and fever.   HENT: Negative.    Eyes: Negative for redness and visual disturbance.   Respiratory: Positive for shortness of breath (with exertion). Negative for cough and wheezing.    Cardiovascular: Positive for leg swelling. Negative for chest pain and palpitations.   Gastrointestinal: Negative for abdominal pain, diarrhea, nausea and vomiting.   Genitourinary:        Incontinent of urine   Musculoskeletal:        Debility - W/C bound   Skin: Positive for color change and wound.   Neurological: Positive for tremors and weakness. Negative for dizziness, facial asymmetry, speech difficulty and light-headedness.   Psychiatric/Behavioral: Negative for confusion.     Objective:     Vital Signs (Most Recent):  Temp: 97 °F (36.1 °C) (06/02/20 1143)  Pulse: 75 (06/02/20 1500)  Resp: 19 (06/02/20 1143)  BP: 130/83 (06/02/20 1143)  SpO2: (!) 94 % (06/02/20 1143) Vital Signs (24h Range):  Temp:  [96.7 °F (35.9 °C)-98.3 °F (36.8 °C)] 97 °F (36.1 °C)  Pulse:  [] 75  Resp:  [18-20] 19  SpO2:  [94 %-98 %] 94 %  BP:  (112-167)/(64-88) 130/83     Weight: 94.4 kg (208 lb 1.8 oz)  Body mass index is 31.18 kg/m².    Intake/Output Summary (Last 24 hours) at 6/2/2020 1511  Last data filed at 6/2/2020 0400  Gross per 24 hour   Intake 236 ml   Output 400 ml   Net -164 ml      Physical Exam   Constitutional: He is oriented to person, place, and time. He appears well-developed and well-nourished.     Elder  male - unkempt in appearance   HENT:   Head: Normocephalic and atraumatic.   Eyes: Conjunctivae are normal. No scleral icterus.   Neck: Normal range of motion. Neck supple.   Cardiovascular: Normal heart sounds. An irregular rhythm present. Tachycardia present. Exam reveals no gallop and no friction rub.   No murmur heard.  Pulmonary/Chest: Effort normal and breath sounds normal.   Abdominal: Soft. Bowel sounds are normal. He exhibits no distension. There is no tenderness. There is no guarding.   obese   Musculoskeletal: He exhibits no edema or tenderness.   Generalized weakness  Edema to BLE mostly resolved   Neurological: He is alert and oriented to person, place, and time.   Skin: Skin is warm and dry.     Extent of skin erythema to BLE has decreased      There are chronic ulcerations with purulent drainage and various scabs    Skin excoriation that is open in places to the buttocks   Psychiatric: His behavior is normal. His mood appears anxious.   Vitals reviewed.      Significant Labs:   CBC:   Recent Labs   Lab 06/01/20  0506 06/02/20  0422   WBC 2.51* 2.91*   HGB 8.8* 9.2*   HCT 27.9* 29.3*    193     CMP:   Recent Labs   Lab 06/01/20  0506 06/02/20  0422    132*   K 3.7 3.7    104   CO2 27 25   GLU 91 102   BUN 12 15   CREATININE 0.9 0.9   CALCIUM 7.7* 7.8*   PROT 9.7* 10.2*   ALBUMIN 2.0* 2.1*   BILITOT 0.3 0.3   ALKPHOS 74 73   AST 19 26   ALT 15 18   ANIONGAP 6* 3*   EGFRNONAA >60 >60     All pertinent labs within the past 24 hours have been reviewed.    Significant Imaging: I have reviewed all  pertinent imaging results/findings within the past 24 hours.      Assessment/Plan:      * Venous stasis dermatitis of both lower extremities with associated cellulitis  LLE appears acutely infected - Vancomycin and Zosyn initially  Vanc/Zosyn deescalated to Augmentin  Venous stasis dermatitis concerning for anaerobe/fungal infection - metronidazole and nystatin topical  Wound care following  MUCH IMPROVED      Acute on chronic anemia  No signs of active bleeding - no melena, no hematochezia, hematemesis  Hgb/Hct 8.2/26.1 >>>> 7.2/23.5>>>> 8.8/27.9 after 1 unit of blood  Type and Cross match and transfuse 1 unit PrBCs      Debilitated  Will need SNF placement - per PT/OT will need maximum assist   to assist with placement      Chronic diastolic heart failure  Appears compensated  Will continue oral lasix  Strict I & O  Cardiac diet      Acute deep vein thrombosis (DVT) of femoral vein of left lower extremity  According to daughter, pt has been on Coumadin in the past but when placed on Hospice the medication was discontinued  Initially placed on heparin then converted to Eliquis  Eliquis 10 mg bid x 14 doses then 5 mg bid        History of cerebrovascular accident (CVA) with residual deficit  Pt taking ASA only  Is not prescribed a STATIN  Pt is debilitated and transfers from recliner to W/C  PT/OT eval and treat - needs max assist, will need SNF placement  [facility] placement pending - insurance authorization pending      Mixed restrictive and obstructive lung disease  Supplemental oxygen to maintain sats > 92 %  DuoNebs prn      Permanent atrial fibrillation  Initially heart rate 100 >>> 125>>> 75  Continue Telemetry monitoring  Resume diltiazem and ASA>>>> metoprolol added  Pt is not prescribed anticoagulation>>>> Eliquis started for DVT  Cardiology consulted - continue diltiazem, added metoprolol 25 mg bid        VTE Risk Mitigation (From admission, onward)         Ordered     apixaban  tablet 10 mg  2 times daily      05/31/20 1149                      Elsa Perez NP  Department of Hospital Medicine   [facility]

## 2020-06-02 NOTE — ASSESSMENT & PLAN NOTE
Initially heart rate 100 >>> 125>>> 75  Continue Telemetry monitoring  Resume diltiazem and ASA>>>> metoprolol added  Pt is not prescribed anticoagulation>>>> Eliquis started for DVT  Cardiology consulted - continue diltiazem, added metoprolol 25 mg bid

## 2020-06-02 NOTE — HOSPITAL COURSE
6/2/2020-Patient seen and examined in room, lying in bed. Remains in AFIB, rate improved today. Labs reviewed, H/H 9/29, K+ 3.7, Cr 0.9. Will sign off, please reconsult if needed.

## 2020-06-02 NOTE — ASSESSMENT & PLAN NOTE
LLE appears acutely infected - Vancomycin and Zosyn initially  Vanc/Zosyn deescalated to Augmentin  Venous stasis dermatitis concerning for anaerobe/fungal infection - metronidazole and nystatin topical  Wound care following  MUCH IMPROVED

## 2020-06-02 NOTE — PT/OT/SLP PROGRESS
Physical Therapy  Treatment    Jatin Kohler   MRN: [id number]   Admitting Diagnosis: Venous stasis dermatitis of both lower extremities       PT Start Time: 1320     PT Stop Time: 1400    PT Total Time (min): 40 min       Billable Minutes:  Gait Training 10, Therapeutic Activity 15 and Therapeutic Exercise 15    Treatment Type: Treatment  PT/PTA: PT     PTA Visit Number: 0       General Precautions: Standard, fall  Orthopedic Precautions: N/A   Braces:           Subjective:  Communicated with RN prior to session. Patient agreed to PT session; reported dizziness in standing           Objective: Patient found in bed, supine and HOB elevated       Functional Mobility:  Bed Mobility: supine to sit with mod A and vc/tc's for log-rolling and use of bed rail; sit to supine min A with same cues for log-rolling; sit balance acts on eob with le exs and reaching to challenge balance       Transfers: sit to/from stand from higher bed min/mod a for safety/initial balance and cues for safe hand placement with use of RW       Gait: Stand tolerance by side of bed with RW min A then patient side stepped to pt.'s L side toward HOB with RW and min/mod A for balance/RW management; unable to walk away from bed r/t pt. Report of dizziness       Stairs:  n/a    Balance:   Static Sit: G  Dynamic Sit: F+  Static Stand: F with RW  Dynamic stand: P+ with Rw     Therapeutic Activities and Exercises:  PT educated patient on POC, B LE TE to prep mobility with vc/tc's and PT demo for correct technique AND safety/fall precautions with mobility using RW.     AM-PAC 6 CLICK MOBILITY  How much help from another person does this patient currently need?   1 = Unable, Total/Dependent Assistance  2 = A lot, Maximum/Moderate Assistance  3 = A little, Minimum/Contact Guard/Supervision  4 = None, Modified [place]/Independent         AM-PAC Raw Score CMS G-Code Modifier Level of Impairment Assistance   6 % Total / Unable   7 - 9 CM 80 - 100%  Maximal Assist   10 - 14 CL 60 - 80% Moderate Assist   15 - 19 CK 40 - 60% Moderate Assist   20 - 22 CJ 20 - 40% Minimal Assist   23 CI 1-20% SBA / CGA   24 CH 0% Independent/ Mod I     Patient left HOB elevated with all lines intact, call button in reach, bed alarm on and RN notified.    Assessment:  Jatin Kohler is a 80 y.o. male with a medical diagnosis of Venous stasis dermatitis of both lower extremities and presents with impaired mobility/dec act hi/inc fall risk.    Rehab identified problem list/impairments: Rehab identified problem list/impairments: weakness, impaired functional mobilty, decreased safety awareness, gait instability, impaired endurance, impaired balance, decreased lower extremity function, impaired self care skills, decreased upper extremity function, impaired coordination    Rehab potential is good.    Activity tolerance: Fair    Discharge recommendations: Discharge Facility/Level of Care Needs: nursing facility, skilled     Barriers to discharge:      Equipment recommendations: Equipment Needed After Discharge: (tbd by next level of care)     GOALS:   Multidisciplinary Problems     Physical Therapy Goals        Problem: Physical Therapy Goal    Goal Priority Disciplines Outcome Goal Variances Interventions   Physical Therapy Goal     PT, PT/OT Ongoing, Progressing     Description:  LTGs to be met within 7 days (6/7/20):  1. Patient will perform bed mobility with mod assist  2.  Patient will perform functional transfers with RW with mod assist  3. Patient will ambulate 20 feet with RW with mod assist and no gross LOB  4.  Patient will perform BLE therapeutic exercises 10 x 2 in all planes                    PLAN:    Patient to be seen 5 x/week  to address the above listed problems via gait training, therapeutic activities, therapeutic exercises  Plan of Care expires: 06/07/20  Plan of Care reviewed with: patient         Adithya Hilario, PT  06/02/2020

## 2020-06-03 VITALS
TEMPERATURE: 97 F | SYSTOLIC BLOOD PRESSURE: 106 MMHG | WEIGHT: 201.75 LBS | RESPIRATION RATE: 19 BRPM | BODY MASS INDEX: 29.88 KG/M2 | HEART RATE: 85 BPM | OXYGEN SATURATION: 94 % | DIASTOLIC BLOOD PRESSURE: 66 MMHG | HEIGHT: 69 IN

## 2020-06-03 PROBLEM — I87.2 VENOUS STASIS DERMATITIS OF BOTH LOWER EXTREMITIES: Status: RESOLVED | Noted: 2020-01-11 | Resolved: 2020-06-03

## 2020-06-03 PROBLEM — I69.30 HISTORY OF CEREBROVASCULAR ACCIDENT (CVA) WITH RESIDUAL DEFICIT: Status: RESOLVED | Noted: 2018-01-29 | Resolved: 2020-06-03

## 2020-06-03 PROCEDURE — 97530 THERAPEUTIC ACTIVITIES: CPT | Mod: HCNC

## 2020-06-03 PROCEDURE — 97110 THERAPEUTIC EXERCISES: CPT | Mod: HCNC

## 2020-06-03 PROCEDURE — G0378 HOSPITAL OBSERVATION PER HR: HCPCS | Mod: HCNC

## 2020-06-03 PROCEDURE — 25000242 PHARM REV CODE 250 ALT 637 W/ HCPCS: Mod: HCNC | Performed by: NURSE PRACTITIONER

## 2020-06-03 PROCEDURE — 94640 AIRWAY INHALATION TREATMENT: CPT | Mod: HCNC

## 2020-06-03 PROCEDURE — 25000003 PHARM REV CODE 250: Mod: HCNC | Performed by: NURSE PRACTITIONER

## 2020-06-03 RX ORDER — PANTOPRAZOLE SODIUM 40 MG/1
40 TABLET, DELAYED RELEASE ORAL DAILY
Qty: 30 TABLET | Refills: 11 | Status: SHIPPED | OUTPATIENT
Start: 2020-06-04 | End: 2021-06-04

## 2020-06-03 RX ORDER — MICONAZOLE NITRATE 2 %
POWDER (GRAM) TOPICAL 2 TIMES DAILY
Qty: 85 G | Refills: 1 | Status: SHIPPED | OUTPATIENT
Start: 2020-06-03

## 2020-06-03 RX ORDER — METRONIDAZOLE 10 MG/G
GEL TOPICAL 2 TIMES DAILY
Qty: 60 G | Refills: 1 | Status: SHIPPED | OUTPATIENT
Start: 2020-06-03 | End: 2021-06-03

## 2020-06-03 RX ORDER — FUROSEMIDE 40 MG/1
40 TABLET ORAL DAILY
Qty: 30 TABLET | Refills: 1 | Status: SHIPPED | OUTPATIENT
Start: 2020-06-04 | End: 2021-06-04

## 2020-06-03 RX ORDER — METOPROLOL TARTRATE 25 MG/1
25 TABLET, FILM COATED ORAL 2 TIMES DAILY
Qty: 60 TABLET | Refills: 11 | Status: SHIPPED | OUTPATIENT
Start: 2020-06-03 | End: 2021-06-03

## 2020-06-03 RX ORDER — AMOXICILLIN AND CLAVULANATE POTASSIUM 875; 125 MG/1; MG/1
1 TABLET, FILM COATED ORAL 2 TIMES DAILY
Qty: 7 TABLET | Refills: 0 | Status: SHIPPED | OUTPATIENT
Start: 2020-06-03 | End: 2020-06-10

## 2020-06-03 RX ADMIN — APIXABAN 10 MG: 2.5 TABLET, FILM COATED ORAL at 09:06

## 2020-06-03 RX ADMIN — ASPIRIN 81 MG: 81 TABLET, COATED ORAL at 09:06

## 2020-06-03 RX ADMIN — METRONIDAZOLE: 10 GEL TOPICAL at 02:06

## 2020-06-03 RX ADMIN — METRONIDAZOLE: 10 GEL TOPICAL at 04:06

## 2020-06-03 RX ADMIN — FUROSEMIDE 40 MG: 40 TABLET ORAL at 09:06

## 2020-06-03 RX ADMIN — DILTIAZEM HYDROCHLORIDE 120 MG: 120 CAPSULE, COATED, EXTENDED RELEASE ORAL at 09:06

## 2020-06-03 RX ADMIN — METOPROLOL TARTRATE 25 MG: 25 TABLET ORAL at 09:06

## 2020-06-03 RX ADMIN — AMOXICILLIN AND CLAVULANATE POTASSIUM 1 TABLET: 875; 125 TABLET, FILM COATED ORAL at 09:06

## 2020-06-03 RX ADMIN — ANTI-FUNGAL POWDER MICONAZOLE NITRATE TALC FREE: 1.42 POWDER TOPICAL at 09:06

## 2020-06-03 RX ADMIN — PANTOPRAZOLE SODIUM 40 MG: 40 TABLET, DELAYED RELEASE ORAL at 09:06

## 2020-06-03 RX ADMIN — MICONAZOLE NITRATE: 2 OINTMENT TOPICAL at 09:06

## 2020-06-03 RX ADMIN — IPRATROPIUM BROMIDE AND ALBUTEROL SULFATE 3 ML: .5; 3 SOLUTION RESPIRATORY (INHALATION) at 07:06

## 2020-06-03 RX ADMIN — OXYBUTYNIN CHLORIDE 10 MG: 5 TABLET, EXTENDED RELEASE ORAL at 09:06

## 2020-06-03 NOTE — PLAN OF CARE
POC Reviewed with Patient.   AAO.   Up to chair today , mod assist x 2 with therapy   Wound care to bilateral lower legs, buttock as ordered  Fall risk- call light in reach, bed alarm, non skid socks  Progressive mobility completed-arm raises, stood at side of bed, ankle pumps  PIV x2 SL  Will continue to monitor

## 2020-06-03 NOTE — PT/OT/SLP PROGRESS
Physical Therapy  Treatment    Jatin Kohler   MRN: [id number]   Admitting Diagnosis: Venous stasis dermatitis of both lower extremities    PT Received On: 06/03/20  PT Start Time: 0915     PT Stop Time: 0940    PT Total Time (min): 25 min       Billable Minutes:  Therapeutic Activity 15 and Therapeutic Exercise 10    Treatment Type: Treatment  PT/PTA: PT         General Precautions: Standard, fall  Orthopedic Precautions: N/A   Braces: N/A    Subjective:  Communicated with NURSE BRENNAN prior to session.  Pain/Comfort  Pain Rating 1: 9/10  Location - Orientation 1: lower  Location 1: back    Objective:   Patient found with: telemetry, peripheral IV    Functional Mobility:  Therapeutic Activities and Exercises:  PT FOUND SUPINE IN BED UPON ARRIVAL, AGITATED BUT AGREEABLE TO TX, C/O BACK PAIN, SUP>SIT WITH MODA, SEATED SCOOT TO EOB WITH MODA, REVIEW RW USE AND SAFETY DURING TF'S AND GAIT, SIT>STAND WITH MODA X 2, PT TOOK SEVERAL SMALL STEPS WITH RW AND MODA X 2 TO TF BED TO CHAIR, CUES FOR UPRIGHT POSTURE, QUICK TO FATIGUE, POOR DYNAMIC BALANCE, PT EDUCATED IN AND PERFORMED BLE THEREX X 20 REPS AROM WITH REST    AM-PAC 6 CLICK MOBILITY  How much help from another person does this patient currently need?   1 = Unable, Total/Dependent Assistance  2 = A lot, Maximum/Moderate Assistance  3 = A little, Minimum/Contact Guard/Supervision  4 = None, Modified [place]/Independent    Turning over in bed (including adjusting bedclothes, sheets and blankets)?: 3  Sitting down on and standing up from a chair with arms (e.g., wheelchair, bedside commode, etc.): 2  Moving from lying on back to sitting on the side of the bed?: 2  Moving to and from a bed to a chair (including a wheelchair)?: 2  Need to walk in hospital room?: 1  Climbing 3-5 steps with a railing?: 1  Basic Mobility Total Score: 11    AM-PAC Raw Score CMS G-Code Modifier Level of Impairment Assistance   6 % Total / Unable   7 - 9 CM 80 - 100% Maximal  Assist   10 - 14 CL 60 - 80% Moderate Assist   15 - 19 CK 40 - 60% Moderate Assist   20 - 22 CJ 20 - 40% Minimal Assist   23 CI 1-20% SBA / CGA   24 CH 0% Independent/ Mod I     Patient left up in chair with all lines intact, call button in reach, chair alarm on and NURSE notified.    Assessment:  Jatin Kohler is a 80 y.o. male with a medical diagnosis of Venous stasis dermatitis of both lower extremities and presents with IMPAIRED FUNCTIONAL MOBILITY. PT WILL BENEFIT FROM CONT. SKILLED P.T. TO ADDRESS IMPAIRMENTS    Rehab identified problem list/impairments: Rehab identified problem list/impairments: weakness, impaired endurance, impaired functional mobilty, gait instability, impaired balance, decreased coordination, decreased safety awareness    Rehab potential is fair.    Activity tolerance: Fair    Discharge recommendations: Discharge Facility/Level of Care Needs: nursing facility, skilled     Barriers to discharge:      Equipment recommendations: Equipment Needed After Discharge: none     GOALS:   Multidisciplinary Problems     Physical Therapy Goals        Problem: Physical Therapy Goal    Goal Priority Disciplines Outcome Goal Variances Interventions   Physical Therapy Goal     PT, PT/OT Ongoing, Progressing     Description:  LTGs to be met within 7 days (6/7/20):  1. Patient will perform bed mobility with mod assist  2.  Patient will perform functional transfers with RW with mod assist  3. Patient will ambulate 20 feet with RW with mod assist and no gross LOB  4.  Patient will perform BLE therapeutic exercises 10 x 2 in all planes                    PLAN:    Patient to be seen 5 x/week  to address the above listed problems via gait training, therapeutic activities, therapeutic exercises  Plan of Care expires: 06/07/20  Plan of Care reviewed with: patient    Pati Craig, PT  06/03/2020

## 2020-06-03 NOTE — PLAN OF CARE
Plan of care reviewed with patient. Patient verbalizes understanding and needs some reinforcement.   Awaiting SNF authorization.   Bed remains low and locked, side rails up x 2, call bell and belongings within reach.   Bedside commode/urinal in room.   Frequent repositioning encouraged and provided to prevent pressure injury.    PIV intact. Cardiac monitor in place.  No c/o or questions at this time.   Will continue to monitor.

## 2020-06-03 NOTE — PLAN OF CARE
Accepted at [facility]      06/03/20 1519   Post-Acute Status   Post-Acute Authorization Placement   Post-Acute Placement Status Set-up Complete   Discharge Plan   Discharge Plan A Skilled Nursing Facility

## 2020-06-03 NOTE — PLAN OF CARE
[facility] called, [facility] obtained for [facility]. [facility] will call with report number and transport time.

## 2020-06-04 LAB
BACTERIA BLD CULT: NORMAL
BACTERIA BLD CULT: NORMAL
BLD PROD TYP BPU: NORMAL
BLD PROD TYP BPU: NORMAL
BLOOD UNIT EXPIRATION DATE: NORMAL
BLOOD UNIT EXPIRATION DATE: NORMAL
BLOOD UNIT TYPE CODE: 6200
BLOOD UNIT TYPE CODE: 6200
BLOOD UNIT TYPE: NORMAL
BLOOD UNIT TYPE: NORMAL
CODING SYSTEM: NORMAL
CODING SYSTEM: NORMAL
DISPENSE STATUS: NORMAL
DISPENSE STATUS: NORMAL
NUM UNITS TRANS PACKED RBC: NORMAL
NUM UNITS TRANS PACKED RBC: NORMAL

## 2020-06-04 NOTE — PLAN OF CARE
06/04/20 1530   Discharge Assessment   Assessment Type Final Discharge Note   Confirmed/corrected address and phone number on facesheet? Yes   Assessment information obtained from? Patient;Caregiver   Discharge Plan A Skilled Nursing Facility   Patient/Family in Agreement with Plan yes   Michael Mejia

## 2020-06-04 NOTE — NURSING
Spoke to [place] in reference to patient discharging to [facility] . Patient's daughter Marialuisa aware of discharge.

## 2020-06-04 NOTE — DISCHARGE SUMMARY
[facility] Hospital Medicine  Discharge Summary      Patient Name: Jatin Kohler  MRN: [id number]  Admission Date: 5/30/2020  Hospital Length of Stay: 0 days  Discharge Date and Time: 6/3/2020  4:30 PM  Attending Physician: Rufus Cabral MD  Discharging Provider: Elsa Perez NP  Primary Care Provider: Addi Saldaña MD      HPI:   Pt is a 79 yo male with PMHx of CHF, HTN, PVD, Sleep apnea, Anemia due to chronic blood loss who presents due to worsened left leg swelling over the last week. Pt is W/C bound and has chronic wounds to the bilateral legs and to sacrum. According to daughter, he is noncompliant with medications. With the left leg swelling, pt has a oozing wound to the right medial leg and serous oozing from bilateral legs. Pt reports SOB with exertion and occasional cough but denies chest pain, fever, chills, abdominal pain, nausea/vomiting, diarrhea and constipation. Labs note WBC 3.75, H/H 8.2/26.1, glucose 148, , troponin 0.015 and lactate 3.1. Procalcitonin is normal and COVID negative. A venous US of LLE revealed and DVT to left femoral vein and greater saphenous vein. CXR - Enlarged heart with possible pleural fluid inferiorly. Pt is placed on Observation to initiate treatment for LLE DVT with concerns for bilateral leg cellulitis and a pressure ulcer to the sacral area. TRUERAIN - Kenya Soriano, pt's daughter.       * No surgery found *      Hospital Course:   Pt admitted with acute DVT to the left leg with swelling. Also, noted with BLE venous stasis dermatitis with associated cellulitis especially to the left lower leg. Pt is debilitated and has chronic wounds to the legs. He is wheelchair bound and has skin excoriations to the bilateral buttocks.  Also, with candida dermatitis to bilateral abdominal folds, topical nystatin in progress. IV Vanco and Zosyn deescalated to Augmentin. Also, topical miconazole and metronidazole bid to venous stasis dermatitis. PT/OT evaluated the  patient documented need for maximum assist and recommends SNF placement. Case management assisting.  6/1/2020 - Afib with RVR per Telemetry and Cardiology recommended metoprolol 25 mg bid along with diltiazem and Eliquis. Wound care saw the patient and offered recs for venous stasis dermatitis and suspected deep tissue injury with excoriation to the buttock.  6/2/2020 - Pt is medically stable. LLE venous stasis dermatitis with associated cellulitis much improved. Less swelling appreciated to left leg. Heart rate improved. Pt anxious to be discharged. [facility] is pending.   6/3/2020 - pt is medically stable. Insurance authorized SN[facility] placement to [facility]. Pt was seen and examined and determined to be safe and stable for discharge. Pt was prescribed Eliquis, Augmentin, lopressor and topical meds for his venous stasis dermatitis. He was advised to follow up with PCP upon discharge from SNF.      Consults:   Consults (From admission, onward)        Status Ordering Provider     Inpatient consult to Cardiology  Once     Provider:  Marquis Walters MD    Completed YADIEL MORALEZ     Inpatient consult to Social Work  Once     Provider:  (Not yet assigned)    Completed YADIEL MORALEZ     IP consult to case management  Once     Provider:  (Not yet assigned)    Completed GWEN SEYMOUR          No new Assessment & Plan notes have been filed under this hospital service since the last note was generated.  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    Why:  SNF  Contact information:  [address] [address] [phone]               Patient Instructions:      Notify your health care provider if you experience any of the following:  persistent nausea and vomiting or diarrhea     Notify your health care provider if you experience any of the following:  severe uncontrolled pain     Notify your health care provider if you experience any of the following:  difficulty breathing or increased cough     Activity as tolerated       Significant Diagnostic Studies: Labs: CMP No results for input(s): NA, K, CL, CO2, GLU, BUN, CREATININE, CALCIUM, PROT, ALBUMIN, BILITOT, ALKPHOS, AST, ALT, ANIONGAP, ESTGFRAFRICA, EGFRNONAA in the last 48 hours. and CBC No results for input(s): WBC, HGB, HCT, PLT in the last 48 hours.    Pending Diagnostic Studies:     None         Medications:  Reconciled Home Medications:      Medication List      START taking these medications    amoxicillin-clavulanate 875-125mg 875-125 mg per tablet  Commonly known as:  AUGMENTIN  Take 1 tablet by mouth 2 (two) times daily. for 7 days     apixaban 5 mg Tab  Commonly known as:  ELIQUIS  Take 1 tablet (5 mg total) by mouth 2 (two) times daily.     metoprolol tartrate 25 MG tablet  Commonly known as:  LOPRESSOR  Take 1 tablet (25 mg total) by mouth 2 (two) times daily.     metronidazole 1% 1 % Gel  Commonly known as:  METROGEL  Apply topically 2 (two) times a day.     * miconazole nitrate 2% 2 %  Oint  Commonly known as:  MICOTIN  Apply topically 2 (two) times daily.     * miconazole NITRATE 2 % 2 % top powder  Commonly known as:  MICOTIN  Apply topically 2 (two) times daily.     pantoprazole 40 MG tablet  Commonly known as:  PROTONIX  Take 1 tablet (40 mg total) by mouth once daily.         * This list has 2 medication(s) that are the same as other medications prescribed for you. Read the directions carefully, and ask your doctor or other care provider to review them with you.            CHANGE how you take these medications    furosemide 40 MG tablet  Commonly known as:  LASIX  Take 1 tablet (40 mg total) by mouth once daily.  What changed:  See the new instructions.        CONTINUE taking these medications    aspirin 81 MG EC tablet  Commonly known as:  ECOTRIN  Take 1 tablet (81 mg total) by mouth once daily.     diltiaZEM 120 MG Cp24  Commonly known as:  CARDIZEM CD  Take 1 capsule (120 mg total) by mouth once daily.     LORazepam 1 MG tablet  Commonly known as:  ATIVAN  Take 1 tablet (1 mg total) by mouth 2 (two) times daily as needed for Anxiety.     naproxen sodium 220 MG tablet  Commonly known as:  ANAPROX  Take 220 mg by mouth 2 (two) times daily as needed.     oxybutynin 10 MG 24 hr tablet  Commonly known as:  DITROPAN-XL  Take 1 tablet (10 mg total) by mouth once daily.            Indwelling Lines/Drains at time of discharge:   Lines/Drains/Airways     None                 Time spent on the discharge of patient: > 30 minutes  Patient was seen and examined on the date of discharge and determined to be suitable for discharge.         Elsa Perez NP  Department of Hospital Medicine  [facility]

## 2020-09-11 ENCOUNTER — HOSPITAL ENCOUNTER (OUTPATIENT)
Facility: HOSPITAL | Age: 80
Discharge: HOSPICE | End: 2020-09-15
Attending: EMERGENCY MEDICINE | Admitting: FAMILY MEDICINE
Payer: MEDICARE

## 2020-09-11 DIAGNOSIS — D64.9 SYMPTOMATIC ANEMIA: Primary | ICD-10-CM

## 2020-09-11 DIAGNOSIS — N17.9 AKI (ACUTE KIDNEY INJURY): ICD-10-CM

## 2020-09-11 DIAGNOSIS — D62 ACUTE BLOOD LOSS ANEMIA: ICD-10-CM

## 2020-09-11 PROBLEM — F03.90 DEMENTIA: Status: ACTIVE | Noted: 2020-09-11

## 2020-09-11 PROBLEM — Z79.01 CHRONIC ANTICOAGULATION: Status: ACTIVE | Noted: 2020-09-11

## 2020-09-11 LAB
ABO + RH BLD: NORMAL
ABO GROUP BLD: NORMAL
ALBUMIN SERPL BCP-MCNC: 1.9 G/DL (ref 3.5–5.2)
ALP SERPL-CCNC: 70 U/L (ref 55–135)
ALT SERPL W/O P-5'-P-CCNC: 10 U/L (ref 10–44)
ANION GAP SERPL CALC-SCNC: 7 MMOL/L (ref 8–16)
APTT BLDCRRT: 25.1 SEC (ref 21–32)
AST SERPL-CCNC: 17 U/L (ref 10–40)
BASOPHILS # BLD AUTO: 0 K/UL (ref 0–0.2)
BASOPHILS NFR BLD: 0 % (ref 0–1.9)
BILIRUB SERPL-MCNC: 0.2 MG/DL (ref 0.1–1)
BILIRUB UR QL STRIP: NEGATIVE
BLD GP AB SCN CELLS X3 SERPL QL: NORMAL
BLD PROD TYP BPU: NORMAL
BLOOD UNIT EXPIRATION DATE: NORMAL
BLOOD UNIT TYPE CODE: 6200
BLOOD UNIT TYPE: NORMAL
BUN SERPL-MCNC: 31 MG/DL (ref 8–23)
CALCIUM SERPL-MCNC: 8 MG/DL (ref 8.7–10.5)
CHLORIDE SERPL-SCNC: 105 MMOL/L (ref 95–110)
CLARITY UR: CLEAR
CO2 SERPL-SCNC: 27 MMOL/L (ref 23–29)
CODING SYSTEM: NORMAL
COLOR UR: YELLOW
CREAT SERPL-MCNC: 1.8 MG/DL (ref 0.5–1.4)
DIFFERENTIAL METHOD: ABNORMAL
DISPENSE STATUS: NORMAL
EOSINOPHIL # BLD AUTO: 0.1 K/UL (ref 0–0.5)
EOSINOPHIL NFR BLD: 2 % (ref 0–8)
ERYTHROCYTE [DISTWIDTH] IN BLOOD BY AUTOMATED COUNT: 19.8 % (ref 11.5–14.5)
EST. GFR  (AFRICAN AMERICAN): 40 ML/MIN/1.73 M^2
EST. GFR  (NON AFRICAN AMERICAN): 35 ML/MIN/1.73 M^2
GLUCOSE SERPL-MCNC: 124 MG/DL (ref 70–110)
GLUCOSE UR QL STRIP: NEGATIVE
HCT VFR BLD AUTO: 21.9 % (ref 40–54)
HGB BLD-MCNC: 6.6 G/DL (ref 14–18)
HGB UR QL STRIP: ABNORMAL
IMM GRANULOCYTES # BLD AUTO: 0.02 K/UL (ref 0–0.04)
IMM GRANULOCYTES NFR BLD AUTO: 0.7 % (ref 0–0.5)
INR PPP: 1.1 (ref 0.8–1.2)
KETONES UR QL STRIP: NEGATIVE
LEUKOCYTE ESTERASE UR QL STRIP: NEGATIVE
LYMPHOCYTES # BLD AUTO: 1 K/UL (ref 1–4.8)
LYMPHOCYTES NFR BLD: 33.4 % (ref 18–48)
MCH RBC QN AUTO: 30.1 PG (ref 27–31)
MCHC RBC AUTO-ENTMCNC: 30.1 G/DL (ref 32–36)
MCV RBC AUTO: 100 FL (ref 82–98)
MICROSCOPIC COMMENT: ABNORMAL
MONOCYTES # BLD AUTO: 0.2 K/UL (ref 0.3–1)
MONOCYTES NFR BLD: 7 % (ref 4–15)
NEUTROPHILS # BLD AUTO: 1.7 K/UL (ref 1.8–7.7)
NEUTROPHILS NFR BLD: 56.9 % (ref 38–73)
NITRITE UR QL STRIP: NEGATIVE
NRBC BLD-RTO: 0 /100 WBC
NUM UNITS TRANS PACKED RBC: NORMAL
OB PNL STL: NEGATIVE
PH UR STRIP: 7 [PH] (ref 5–8)
PLATELET # BLD AUTO: 142 K/UL (ref 150–350)
PMV BLD AUTO: 9.9 FL (ref 9.2–12.9)
POTASSIUM SERPL-SCNC: 3.2 MMOL/L (ref 3.5–5.1)
PROT SERPL-MCNC: 11.1 G/DL (ref 6–8.4)
PROT UR QL STRIP: NEGATIVE
PROTHROMBIN TIME: 11.6 SEC (ref 9–12.5)
RBC # BLD AUTO: 2.19 M/UL (ref 4.6–6.2)
RBC #/AREA URNS HPF: 7 /HPF (ref 0–4)
RH BLD: NORMAL
SARS-COV-2 RDRP RESP QL NAA+PROBE: NEGATIVE
SODIUM SERPL-SCNC: 139 MMOL/L (ref 136–145)
SP GR UR STRIP: 1.01 (ref 1–1.03)
URN SPEC COLLECT METH UR: ABNORMAL
UROBILINOGEN UR STRIP-ACNC: NEGATIVE EU/DL
WBC # BLD AUTO: 3.02 K/UL (ref 3.9–12.7)

## 2020-09-11 PROCEDURE — G0378 HOSPITAL OBSERVATION PER HR: HCPCS | Mod: HCNC

## 2020-09-11 PROCEDURE — 85018 HEMOGLOBIN: CPT | Mod: HCNC,91

## 2020-09-11 PROCEDURE — 80053 COMPREHEN METABOLIC PANEL: CPT | Mod: HCNC

## 2020-09-11 PROCEDURE — 96376 TX/PRO/DX INJ SAME DRUG ADON: CPT

## 2020-09-11 PROCEDURE — 25000003 PHARM REV CODE 250: Mod: HCNC | Performed by: FAMILY MEDICINE

## 2020-09-11 PROCEDURE — U0002 COVID-19 LAB TEST NON-CDC: HCPCS | Mod: HCNC

## 2020-09-11 PROCEDURE — 96374 THER/PROPH/DIAG INJ IV PUSH: CPT

## 2020-09-11 PROCEDURE — 81000 URINALYSIS NONAUTO W/SCOPE: CPT | Mod: HCNC

## 2020-09-11 PROCEDURE — P9016 RBC LEUKOCYTES REDUCED: HCPCS | Mod: HCNC

## 2020-09-11 PROCEDURE — 36415 COLL VENOUS BLD VENIPUNCTURE: CPT | Mod: HCNC

## 2020-09-11 PROCEDURE — 86850 RBC ANTIBODY SCREEN: CPT | Mod: HCNC

## 2020-09-11 PROCEDURE — 86901 BLOOD TYPING SEROLOGIC RH(D): CPT | Mod: HCNC

## 2020-09-11 PROCEDURE — 86900 BLOOD TYPING SEROLOGIC ABO: CPT | Mod: HCNC

## 2020-09-11 PROCEDURE — 99204 OFFICE O/P NEW MOD 45 MIN: CPT | Mod: HCNC,,, | Performed by: INTERNAL MEDICINE

## 2020-09-11 PROCEDURE — 96361 HYDRATE IV INFUSION ADD-ON: CPT | Mod: HCNC

## 2020-09-11 PROCEDURE — 99291 CRITICAL CARE FIRST HOUR: CPT | Mod: HCNC

## 2020-09-11 PROCEDURE — 99204 PR OFFICE/OUTPT VISIT, NEW, LEVL IV, 45-59 MIN: ICD-10-PCS | Mod: HCNC,,, | Performed by: INTERNAL MEDICINE

## 2020-09-11 PROCEDURE — 85025 COMPLETE CBC W/AUTO DIFF WBC: CPT | Mod: HCNC

## 2020-09-11 PROCEDURE — 85610 PROTHROMBIN TIME: CPT | Mod: HCNC

## 2020-09-11 PROCEDURE — 86920 COMPATIBILITY TEST SPIN: CPT | Mod: HCNC

## 2020-09-11 PROCEDURE — 96361 HYDRATE IV INFUSION ADD-ON: CPT

## 2020-09-11 PROCEDURE — 85730 THROMBOPLASTIN TIME PARTIAL: CPT | Mod: HCNC

## 2020-09-11 PROCEDURE — 25000003 PHARM REV CODE 250: Mod: HCNC | Performed by: EMERGENCY MEDICINE

## 2020-09-11 PROCEDURE — 96375 TX/PRO/DX INJ NEW DRUG ADDON: CPT

## 2020-09-11 PROCEDURE — 85014 HEMATOCRIT: CPT | Mod: HCNC

## 2020-09-11 PROCEDURE — 82272 OCCULT BLD FECES 1-3 TESTS: CPT | Mod: HCNC

## 2020-09-11 RX ORDER — FUROSEMIDE 40 MG/1
40 TABLET ORAL DAILY
Status: DISCONTINUED | OUTPATIENT
Start: 2020-09-12 | End: 2020-09-15 | Stop reason: HOSPADM

## 2020-09-11 RX ORDER — METOPROLOL TARTRATE 25 MG/1
25 TABLET, FILM COATED ORAL 2 TIMES DAILY
Status: DISCONTINUED | OUTPATIENT
Start: 2020-09-11 | End: 2020-09-15 | Stop reason: HOSPADM

## 2020-09-11 RX ORDER — OXYBUTYNIN CHLORIDE 5 MG/1
10 TABLET, EXTENDED RELEASE ORAL DAILY
Status: DISCONTINUED | OUTPATIENT
Start: 2020-09-12 | End: 2020-09-15 | Stop reason: HOSPADM

## 2020-09-11 RX ORDER — PANTOPRAZOLE SODIUM 40 MG/1
40 TABLET, DELAYED RELEASE ORAL DAILY
Status: DISCONTINUED | OUTPATIENT
Start: 2020-09-12 | End: 2020-09-15 | Stop reason: HOSPADM

## 2020-09-11 RX ORDER — DILTIAZEM HYDROCHLORIDE 120 MG/1
120 CAPSULE, COATED, EXTENDED RELEASE ORAL DAILY
Status: DISCONTINUED | OUTPATIENT
Start: 2020-09-12 | End: 2020-09-15 | Stop reason: HOSPADM

## 2020-09-11 RX ORDER — LORAZEPAM 1 MG/1
1 TABLET ORAL 2 TIMES DAILY PRN
Status: DISCONTINUED | OUTPATIENT
Start: 2020-09-11 | End: 2020-09-11

## 2020-09-11 RX ORDER — FERROUS SULFATE 325(65) MG
325 TABLET ORAL
COMMUNITY

## 2020-09-11 RX ORDER — HYDROCODONE BITARTRATE AND ACETAMINOPHEN 500; 5 MG/1; MG/1
TABLET ORAL
Status: DISCONTINUED | OUTPATIENT
Start: 2020-09-11 | End: 2020-09-15 | Stop reason: HOSPADM

## 2020-09-11 RX ORDER — SODIUM CHLORIDE 0.9 % (FLUSH) 0.9 %
10 SYRINGE (ML) INJECTION
Status: DISCONTINUED | OUTPATIENT
Start: 2020-09-11 | End: 2020-09-15 | Stop reason: HOSPADM

## 2020-09-11 RX ORDER — DONEPEZIL HYDROCHLORIDE 5 MG/1
5 TABLET, FILM COATED ORAL NIGHTLY
Status: DISCONTINUED | OUTPATIENT
Start: 2020-09-11 | End: 2020-09-15 | Stop reason: HOSPADM

## 2020-09-11 RX ORDER — OLANZAPINE 2.5 MG/1
2.5 TABLET ORAL NIGHTLY
COMMUNITY

## 2020-09-11 RX ORDER — METOPROLOL TARTRATE 1 MG/ML
5 INJECTION, SOLUTION INTRAVENOUS EVERY 6 HOURS PRN
Status: DISCONTINUED | OUTPATIENT
Start: 2020-09-11 | End: 2020-09-15 | Stop reason: HOSPADM

## 2020-09-11 RX ORDER — OLANZAPINE 2.5 MG/1
2.5 TABLET ORAL NIGHTLY
Status: DISCONTINUED | OUTPATIENT
Start: 2020-09-11 | End: 2020-09-15 | Stop reason: HOSPADM

## 2020-09-11 RX ORDER — DONEPEZIL HYDROCHLORIDE 5 MG/1
5 TABLET, FILM COATED ORAL NIGHTLY
COMMUNITY

## 2020-09-11 RX ORDER — FERROUS SULFATE 325(65) MG
325 TABLET, DELAYED RELEASE (ENTERIC COATED) ORAL 2 TIMES DAILY
Status: DISCONTINUED | OUTPATIENT
Start: 2020-09-11 | End: 2020-09-15 | Stop reason: HOSPADM

## 2020-09-11 RX ORDER — SODIUM CHLORIDE 9 MG/ML
1000 INJECTION, SOLUTION INTRAVENOUS
Status: COMPLETED | OUTPATIENT
Start: 2020-09-11 | End: 2020-09-11

## 2020-09-11 RX ADMIN — DONEPEZIL HYDROCHLORIDE 5 MG: 5 TABLET, FILM COATED ORAL at 10:09

## 2020-09-11 RX ADMIN — FERROUS SULFATE TAB EC 325 MG (65 MG FE EQUIVALENT) 325 MG: 325 (65 FE) TABLET DELAYED RESPONSE at 10:09

## 2020-09-11 RX ADMIN — SODIUM CHLORIDE 1000 ML: 0.9 INJECTION, SOLUTION INTRAVENOUS at 04:09

## 2020-09-11 RX ADMIN — METOPROLOL TARTRATE 25 MG: 25 TABLET, FILM COATED ORAL at 10:09

## 2020-09-11 NOTE — ASSESSMENT & PLAN NOTE
9/11:  Secondary to GI bleed  GI evaluated pt  Per report pt has had EGD and cscope at [facility]  GI planning for outpatient capsule endoscopy  Will transfuse 1 unit  Continue to monitor h/h q8h  Transfuse hgb < 8  Discontinue eliquis

## 2020-09-11 NOTE — ASSESSMENT & PLAN NOTE
9/11:  Patient started on donepezil outpatient  Daughter states that pt should   Not receive any ativan for agitation due   Worsening psychosis

## 2020-09-11 NOTE — HPI
Jatin Kohler is a 80 y.o. male patient who presents to the Emergency Department for evaluation of low H&H. Patient found sitting alone in the lobby and is unable to explain his reason for visiting the ED. Paperwork regarding patient reports he is a resident of [facility] sent for further evaluation of H&H of 6.4/19.7. Information provided by Daughter. ED workup showed: CBC showed H/H 6.6/21.9, platelets of 142. CMP showed K+ of 3.2, BUN of 31, Creatinine of 1.8, CT abdomen pending. Patient placed in observation for acute blood loss anemia and BELEN.

## 2020-09-11 NOTE — H&P
[facility] Hospital Medicine  History & Physical    Patient Name: Jatin Kohler  MRN: [id number]  Admission Date: 9/11/2020  Attending Physician: Mukund Mae MD   Primary Care Provider: Addi Saldaña MD         Patient information was obtained from relative(s) and ER records.     Subjective:     Principal Problem:Acute blood loss anemia    Chief Complaint:   Chief Complaint   Patient presents with    Abnormal Lab     pt unable to state reason for ED visit.  paperwork states he is a resident of [facility] sent for H&H of 6.4/19.7        HPI: Jatin Kohler is a 80 y.o. male patient who presents to the Emergency Department for evaluation of low H&H. Patient found sitting alone in the lobby and is unable to explain his reason for visiting the ED. Paperwork regarding patient reports he is a resident of [facility] sent for further evaluation of H&H of 6.4/19.7. Information provided by Daughter. ED workup showed: CBC showed H/H 6.6/21.9, platelets of 142. CMP showed K+ of 3.2, BUN of 31, Creatinine of 1.8, CT abdomen pending. Patient placed in observation for acute blood loss anemia and BELEN.     Past Medical History:   Diagnosis Date    *Atrial fibrillation     Anemia due to blood loss, chronic 10/15/2013    Anxiety     Bilateral carotid artery disease 6/2/2016    CHF (congestive heart failure)     Depression     Hyperlipidemia     Hypertension     Prostate hypertrophy     Sleep apnea 9/12/2013    Stroke     Venous insufficiency 9/12/2013       Past Surgical History:   Procedure Laterality Date    APPENDECTOMY      CATARACT EXTRACTION, BILATERAL  08/2013    CHOLECYSTECTOMY      COLON SURGERY      diverticulitis    EYE SURGERY  08/2013    cataract    HEMORRHOID SURGERY      JOINT REPLACEMENT      left hip    WOUND DEBRIDEMENT         Review of patient's allergies indicates:   Allergen Reactions    Ativan [lorazepam]      Worsening psychosis reported when taken for agitation per  daughter's report       No current facility-administered medications on file prior to encounter.      Current Outpatient Medications on File Prior to Encounter   Medication Sig    aspirin (ECOTRIN) 81 MG EC tablet Take 1 tablet (81 mg total) by mouth once daily.    diltiaZEM (CARDIZEM CD) 120 MG Cp24 Take 1 capsule (120 mg total) by mouth once daily.    donepeziL (ARICEPT) 5 MG tablet Take 5 mg by mouth every evening.    ferrous sulfate (FEOSOL) 325 mg (65 mg iron) Tab tablet Take 325 mg by mouth daily with breakfast.    furosemide (LASIX) 40 MG tablet Take 1 tablet (40 mg total) by mouth once daily.    LORazepam (ATIVAN) 1 MG tablet Take 1 tablet (1 mg total) by mouth 2 (two) times daily as needed for Anxiety.    metoprolol tartrate (LOPRESSOR) 25 MG tablet Take 1 tablet (25 mg total) by mouth 2 (two) times daily.    metronidazole 1% (METROGEL) 1 % Gel Apply topically 2 (two) times a day.    miconazole NITRATE 2 % (MICOTIN) 2 % top powder Apply topically 2 (two) times daily.    miconazole nitrate 2% (MICOTIN) 2 % Oint Apply topically 2 (two) times daily.    naproxen sodium (ANAPROX) 220 MG tablet Take 220 mg by mouth 2 (two) times daily as needed.    OLANZapine (ZYPREXA) 2.5 MG tablet Take 2.5 mg by mouth every evening.    oxybutynin (DITROPAN-XL) 10 MG 24 hr tablet Take 1 tablet (10 mg total) by mouth once daily.    pantoprazole (PROTONIX) 40 MG tablet Take 1 tablet (40 mg total) by mouth once daily.    [DISCONTINUED] apixaban (ELIQUIS) 2.5 mg Tab Take by mouth 2 (two) times daily.     Family History     Problem Relation (Age of Onset)    Diabetes Mother, Sister, Sister, Sister    Heart disease Mother    Hypertension Brother        Tobacco Use    Smoking status: Former Smoker     Packs/day: 0.50     Years: 20.00     Pack years: 10.00     Types: Cigarettes     Quit date: 1998     Years since quittin.7    Smokeless tobacco: Never Used   Substance and Sexual Activity    Alcohol use: Not  Currently     Comment: social use of alcohol    Drug use: No    Sexual activity: Not Currently     Birth control/protection: None     Review of Systems   Unable to perform ROS: Dementia     Objective:     Vital Signs (Most Recent):  Temp: 99.1 °F (37.3 °C) (09/11/20 1820)  Pulse: (!) 120 (09/11/20 1820)  Resp: (!) 25 (09/11/20 1820)  BP: 115/67 (09/11/20 1820)  SpO2: 96 % (09/11/20 1820) Vital Signs (24h Range):  Temp:  [98.5 °F (36.9 °C)-99.1 °F (37.3 °C)] 99.1 °F (37.3 °C)  Pulse:  [] 120  Resp:  [21-25] 25  SpO2:  [95 %-97 %] 96 %  BP: ()/(57-92) 115/67     Weight: 82.1 kg (181 lb)  Body mass index is 27.12 kg/m².    Physical Exam  Vitals signs and nursing note reviewed.   Constitutional:       Appearance: Normal appearance. He is well-developed.   HENT:      Head: Normocephalic and atraumatic.   Eyes:      Pupils: Pupils are equal, round, and reactive to light.   Neck:      Musculoskeletal: Normal range of motion and neck supple.   Cardiovascular:      Rate and Rhythm: Tachycardia present. Rhythm irregularly irregular.      Pulses: Normal pulses.      Heart sounds: Normal heart sounds.   Pulmonary:      Effort: Pulmonary effort is normal. No tachypnea, accessory muscle usage or respiratory distress.      Breath sounds: Normal breath sounds.   Abdominal:      General: Bowel sounds are normal.      Palpations: Abdomen is soft.      Tenderness: There is no abdominal tenderness.   Musculoskeletal: Normal range of motion.   Skin:     General: Skin is warm and dry.      Capillary Refill: Capillary refill takes less than 2 seconds.   Neurological:      Mental Status: He is alert. He is disoriented.      Deep Tendon Reflexes: Reflexes are normal and symmetric.   Psychiatric:         Speech: Speech normal.         Behavior: Behavior normal.         Thought Content: Thought content normal.         Judgment: Judgment normal.            Significant Labs:   BMP:   Recent Labs   Lab 09/11/20  1434   *       K 3.2*      CO2 27   BUN 31*   CREATININE 1.8*   CALCIUM 8.0*     CBC:   Recent Labs   Lab 09/11/20  1434   WBC 3.02*   HGB 6.6*   HCT 21.9*   *     CMP:   Recent Labs   Lab 09/11/20  1434      K 3.2*      CO2 27   *   BUN 31*   CREATININE 1.8*   CALCIUM 8.0*   PROT 11.1*   ALBUMIN 1.9*   BILITOT 0.2   ALKPHOS 70   AST 17   ALT 10   ANIONGAP 7*   EGFRNONAA 35*     All pertinent labs within the past 24 hours have been reviewed.    Significant Imaging:   Imaging Results          CT Abdomen Pelvis  Without Contrast (In process)    Procedure changed from CT Enterography Abd_Pelvis With Contrast               Assessment/Plan:     * Acute blood loss anemia  9/11:  Secondary to GI bleed  GI evaluated pt  Per report pt has had EGD and cscope at [facility]  GI planning for outpatient capsule endoscopy  Will transfuse 1 unit  Continue to monitor h/h q8h  Transfuse hgb < 8  Discontinue eliquis        Dementia  9/11:  Patient started on donepezil outpatient  Daughter states that pt should   Not receive any ativan for agitation due   Worsening psychosis      Chronic anticoagulation  9/11:  Will need to discontinue blood thinners      BELEN (acute kidney injury)  9/11:  Likely prerenal due to anemia  Cont to monitor  Was given 1 L fluid bolus  pRBC to be transfused should help   Renal functions       Atrial fibrillation with controlled ventricular response  9/11:  Resume home CCB and BB  Metoprolol IV PRN         VTE Risk Mitigation (From admission, onward)         Ordered     IP VTE HIGH RISK PATIENT  Once      09/11/20 1814     Place sequential compression device  Until discontinued      09/11/20 1814                   Ivone Causey NP  Department of Hospital Medicine   [facility]

## 2020-09-11 NOTE — ED PROVIDER NOTES
SCRIBE #1 NOTE: I, Solmax Hare, am scribing for, and in the presence of, Bradley Calles MD. I have scribed the HPI, ROS, and PEx.     SCRIBE #2 NOTE: I, Amairani Maloney, am scribing for, and in the presence of,  Sruthi Kraus MD. I have scribed the remaining portions of the note not scribed by Scribe #1.     History      Chief Complaint   Patient presents with    Abnormal Lab     pt unable to state reason for ED visit.  paperwork states he is a resident of [facility] [place] sent for H&H of 6.4/19.7       Review of patient's allergies indicates:   Allergen Reactions    No known allergies         HPI   HPI    9/11/2020, 2:07 PM   History obtained from the patient      History of Present Illness: Jatin Kohler is a 80 y.o. male patient who presents to the Emergency Department for evaluation of low H&H. Patient found sitting alone in the lobby and is unable to explain his reason for visiting the ED. Paperwork regarding patient reports he is a resident of [facility] sent for further evaluation of H&H of 6.4/19.7. Patient denies any fever, chills, N/V, dizziness, chest pain, cough, SOB, and all other sxs at this time. No further complaints or concerns at this time.       Arrival mode: Personal vehicle      PCP: Addi Saldaña MD       Past Medical History:  Past Medical History:   Diagnosis Date    *Atrial fibrillation     Anemia due to blood loss, chronic 10/15/2013    Anxiety     Bilateral carotid artery disease 6/2/2016    CHF (congestive heart failure)     Depression     Hyperlipidemia     Hypertension     Prostate hypertrophy     Sleep apnea 9/12/2013    Stroke     Venous insufficiency 9/12/2013       Past Surgical History:  Past Surgical History:   Procedure Laterality Date    APPENDECTOMY      CATARACT EXTRACTION, BILATERAL  08/2013    CHOLECYSTECTOMY      COLON SURGERY      diverticulitis    EYE SURGERY  08/2013    cataract    HEMORRHOID SURGERY      JOINT REPLACEMENT      left hip     WOUND DEBRIDEMENT           Family History:  Family History   Problem Relation Age of Onset    Heart disease Mother     Diabetes Mother     Diabetes Sister     Hypertension Brother     Diabetes Sister     Diabetes Sister        Social History:  Social History     Tobacco Use    Smoking status: Former Smoker     Packs/day: 0.50     Years: 20.00     Pack years: 10.00     Types: Cigarettes     Quit date: 1998     Years since quittin.7    Smokeless tobacco: Never Used   Substance and Sexual Activity    Alcohol use: Not Currently     Comment: social use of alcohol    Drug use: No    Sexual activity: Not Currently     Birth control/protection: None       ROS   Review of Systems   Constitutional: Negative for fever.   HENT: Negative for sore throat.    Respiratory: Negative for cough and shortness of breath.    Cardiovascular: Negative for chest pain.   Gastrointestinal: Negative for nausea and vomiting.   Genitourinary: Negative for dysuria.   Musculoskeletal: Negative for back pain.   Skin: Negative for rash.   Neurological: Negative for dizziness and weakness.   Hematological: Does not bruise/bleed easily.   All other systems reviewed and are negative.    Physical Exam      Initial Vitals [20 1357]   BP Pulse Resp Temp SpO2   123/72 109 (!) 22 98.5 °F (36.9 °C) 97 %      MAP       --          Physical Exam  Nursing Notes and Vital Signs Reviewed.  Constitutional: Patient is in no acute distress. Well-developed and well-nourished.  Head: Atraumatic. Normocephalic.  Eyes: PERRL. EOM intact. Conjunctivae are not pale. No scleral icterus.  ENT: Mucous membranes are moist.    Neck: Supple. Full ROM. No lymphadenopathy.  Cardiovascular: Regular rate. Regular rhythm. No murmurs, rubs, or gallops. Distal pulses are 2+ and symmetric.  Pulmonary/Chest: No respiratory distress. Clear to auscultation bilaterally. No wheezing or rales.  Abdominal: Soft and non-distended.  There is no tenderness.     Musculoskeletal: Moves all extremities. No obvious deformities. No edema.  Skin: Warm and dry.  Neurological:  Alert, awake, and appropriate.  Normal speech.  No acute focal neurological deficits are appreciated.  Psychiatric: Normal affect. Good eye contact. Appropriate in content.    ED Course    Critical Care    Date/Time: 9/11/2020 3:54 PM  Performed by: Bradley Calles MD  Authorized by: Bradley Calles MD   Direct patient critical care time: 30 minutes  Additional history critical care time: 10 minutes  Ordering / reviewing critical care time: 10 minutes  Documentation critical care time: 5 minutes  Consulting other physicians critical care time: 5 minutes  Total critical care time (exclusive of procedural time) : 60 minutes  Critical care time was exclusive of separately billable procedures and treating other patients and teaching time.  Critical care was necessary to treat or prevent imminent or life-threatening deterioration of the following conditions: Blood transfusion.  Critical care was time spent personally by me on the following activities: blood draw for specimens, development of treatment plan with patient or surrogate, discussions with consultants, interpretation of cardiac output measurements, evaluation of patient's response to treatment, obtaining history from patient or surrogate, examination of patient, ordering and performing treatments and interventions, ordering and review of laboratory studies, ordering and review of radiographic studies, pulse oximetry, re-evaluation of patient's condition and review of old charts.    Critical Care    Date/Time: 9/11/2020 5:24 PM  Performed by: Sruthi Kraus MD  Authorized by: Sruthi Kraus MD   Direct patient critical care time: 10 minutes  Additional history critical care time: 10 minutes  Ordering / reviewing critical care time: 5 minutes  Documentation critical care time: 5 minutes  Total critical care time (exclusive of procedural time) :  30 minutes  Critical care time was exclusive of separately billable procedures and treating other patients and teaching time.  Critical care was necessary to treat or prevent imminent or life-threatening deterioration of the following conditions: BELEN.  Critical care was time spent personally by me on the following activities: blood draw for specimens, development of treatment plan with patient or surrogate, discussions with consultants, interpretation of cardiac output measurements, evaluation of patient's response to treatment, examination of patient, obtaining history from patient or surrogate, ordering and performing treatments and interventions, ordering and review of laboratory studies, ordering and review of radiographic studies, pulse oximetry, re-evaluation of patient's condition and review of old charts.        ED Vital Signs:  Vitals:    09/11/20 1357 09/11/20 1407 09/11/20 1429 09/11/20 1434   BP: 123/72  (!) 98/57 (!) 103/59   Pulse: 109  99 87   Resp: (!) 22      Temp: 98.5 °F (36.9 °C)      TempSrc: Oral      SpO2: 97%      Weight:  82.1 kg (181 lb)      09/11/20 1439 09/11/20 1504   BP: (!) 145/92 116/61   Pulse: (!) 119 95   Resp:  (!) 21   Temp:     TempSrc:     SpO2:  97%   Weight:         Abnormal Lab Results:  Labs Reviewed   CBC W/ AUTO DIFFERENTIAL - Abnormal; Notable for the following components:       Result Value    WBC 3.02 (*)     RBC 2.19 (*)     Hemoglobin 6.6 (*)     Hematocrit 21.9 (*)     Mean Corpuscular Volume 100 (*)     Mean Corpuscular Hemoglobin Conc 30.1 (*)     RDW 19.8 (*)     Platelets 142 (*)     Immature Granulocytes 0.7 (*)     Gran # (ANC) 1.7 (*)     Mono # 0.2 (*)     All other components within normal limits   COMPREHENSIVE METABOLIC PANEL - Abnormal; Notable for the following components:    Potassium 3.2 (*)     Glucose 124 (*)     BUN, Bld 31 (*)     Creatinine 1.8 (*)     Calcium 8.0 (*)     Total Protein 11.1 (*)     Albumin 1.9 (*)     Anion Gap 7 (*)     eGFR if   40 (*)     eGFR if non  35 (*)     All other components within normal limits   URINALYSIS, REFLEX TO URINE CULTURE - Abnormal; Notable for the following components:    Occult Blood UA 1+ (*)     All other components within normal limits    Narrative:     Specimen Source->Urine   URINALYSIS MICROSCOPIC - Abnormal; Notable for the following components:    RBC, UA 7 (*)     All other components within normal limits    Narrative:     Specimen Source->Urine   APTT   PROTIME-INR   SARS-COV-2 RNA AMPLIFICATION, QUAL   OCCULT BLOOD X 1, STOOL   TYPE & SCREEN   GROUP & RH   PREPARE RBC SOFT        All Lab Results:  Results for orders placed or performed during the hospital encounter of 09/11/20   CBC auto differential   Result Value Ref Range    WBC 3.02 (L) 3.90 - 12.70 K/uL    RBC 2.19 (L) 4.60 - 6.20 M/uL    Hemoglobin 6.6 (L) 14.0 - 18.0 g/dL    Hematocrit 21.9 (L) 40.0 - 54.0 %    Mean Corpuscular Volume 100 (H) 82 - 98 fL    Mean Corpuscular Hemoglobin 30.1 27.0 - 31.0 pg    Mean Corpuscular Hemoglobin Conc 30.1 (L) 32.0 - 36.0 g/dL    RDW 19.8 (H) 11.5 - 14.5 %    Platelets 142 (L) 150 - 350 K/uL    MPV 9.9 9.2 - 12.9 fL    Immature Granulocytes 0.7 (H) 0.0 - 0.5 %    Gran # (ANC) 1.7 (L) 1.8 - 7.7 K/uL    Immature Grans (Abs) 0.02 0.00 - 0.04 K/uL    Lymph # 1.0 1.0 - 4.8 K/uL    Mono # 0.2 (L) 0.3 - 1.0 K/uL    Eos # 0.1 0.0 - 0.5 K/uL    Baso # 0.00 0.00 - 0.20 K/uL    nRBC 0 0 /100 WBC    Gran% 56.9 38.0 - 73.0 %    Lymph% 33.4 18.0 - 48.0 %    Mono% 7.0 4.0 - 15.0 %    Eosinophil% 2.0 0.0 - 8.0 %    Basophil% 0.0 0.0 - 1.9 %    Differential Method Automated    Comprehensive metabolic panel   Result Value Ref Range    Sodium 139 136 - 145 mmol/L    Potassium 3.2 (L) 3.5 - 5.1 mmol/L    Chloride 105 95 - 110 mmol/L    CO2 27 23 - 29 mmol/L    Glucose 124 (H) 70 - 110 mg/dL    BUN, Bld 31 (H) 8 - 23 mg/dL    Creatinine 1.8 (H) 0.5 - 1.4 mg/dL    Calcium 8.0 (L) 8.7 - 10.5 mg/dL     Total Protein 11.1 (H) 6.0 - 8.4 g/dL    Albumin 1.9 (L) 3.5 - 5.2 g/dL    Total Bilirubin 0.2 0.1 - 1.0 mg/dL    Alkaline Phosphatase 70 55 - 135 U/L    AST 17 10 - 40 U/L    ALT 10 10 - 44 U/L    Anion Gap 7 (L) 8 - 16 mmol/L    eGFR if African American 40 (A) >60 mL/min/1.73 m^2    eGFR if non African American 35 (A) >60 mL/min/1.73 m^2   Urinalysis, Reflex to Urine Culture Urine, Clean Catch    Specimen: Urine   Result Value Ref Range    Specimen UA Urine, Clean Catch     Color, UA Yellow Yellow, Straw, Jennifer    Appearance, UA Clear Clear    pH, UA 7.0 5.0 - 8.0    Specific Gravity, UA 1.010 1.005 - 1.030    Protein, UA Negative Negative    Glucose, UA Negative Negative    Ketones, UA Negative Negative    Bilirubin (UA) Negative Negative    Occult Blood UA 1+ (A) Negative    Nitrite, UA Negative Negative    Urobilinogen, UA Negative <2.0 EU/dL    Leukocytes, UA Negative Negative   APTT   Result Value Ref Range    aPTT 25.1 21.0 - 32.0 sec   Protime-INR   Result Value Ref Range    Prothrombin Time 11.6 9.0 - 12.5 sec    INR 1.1 0.8 - 1.2   COVID-19 Rapid Screening   Result Value Ref Range    SARS-CoV-2 RNA, Amplification, Qual Negative Negative   Urinalysis Microscopic   Result Value Ref Range    RBC, UA 7 (H) 0 - 4 /hpf    Microscopic Comment SEE COMMENT    Occult blood x 1, stool   Result Value Ref Range    Occult Blood Negative Negative   Type & Screen   Result Value Ref Range    Group & Rh CANCELED     Indirect Mac NEG    Group & Rh   Result Value Ref Range    ABO A     Rh Type POS    Prepare RBC 1 Unit   Result Value Ref Range    UNIT NUMBER [id number]     Product Code E4397K11     DISPENSE STATUS CROSSMATCHED     CODING SYSTEM AZMW772     Unit Blood Type Code 6200     Unit Blood Type A POS     Unit Expiration [id number]          Imaging Results:  Imaging Results    None                 The Emergency Provider reviewed the vital signs and test results, which are outlined above.    ED Discussion      3:32 PM: Discussed with patient the need for a blood transfusion along with the risks and benefits at this time. Patient expresses understanding and consents to be transfused at this time.    3:51 PM: Dr. Calles transfers care of patient to Dr. Kraus, awaiting blood transfusion.    4:19 PM: Attempted to admit pt, but Rodger Edmonds NP states need to contact GI first.     4:52 PM: Discussed pt's case with Dr. Daly (gastroenterology) who states: that it Sounds like he has a obscure GI bleed. If he was already scoped last month, we would not re-scope again. Recommend obtaining outside reports, holding eliquis and transfusing. He needs a capsule endoscopy and those are done outpatient.    5:25 PM: Re-evaluated pt. I have discussed test results, shared treatment plan, and the need for admission with pt daughter at bedside. Daughter express understanding at this time and agree with all information. All questions answered. Daughter has no further questions or concerns at this time. Pt is ready for admit.    5:29 PM: Discussed case with Rodger Edmonds NP (Hospital Medicine). Dr. Mae agrees with current care and management of pt and accepts admission.   Admitting Service: Hospital Medicine  Admitting Physician: Dr. Mae  Admit to: Obs Tele         ED Medication(s):  Medications   0.9%  NaCl infusion (for blood administration) (has no administration in time range)   sodium chloride 0.9% bolus 1,000 mL (has no administration in time range)   0.9%  NaCl infusion (has no administration in time range)             Medical Decision Making    Medical Decision Making:   Clinical Tests:   Lab Tests: Ordered and Reviewed           Scribe Attestation:   Scribe #1: I performed the above scribed service and the documentation accurately describes the services I performed. I attest to the accuracy of the note.    Attending:   Physician Attestation Statement for Scribe #1: I, Bradley Calles MD, personally performed the  "services described in this documentation, as scribed by Sol Hare, in my presence, and it is both accurate and complete.       Scribe Attestation:   Scribe #2: I performed the above scribed service and the documentation accurately describes the services I performed. I attest to the accuracy of the note.    Attending Attestation:           Physician Attestation for Scribe:    Physician Attestation Statement for Scribe #2: I, Sruthi Kraus MD, reviewed documentation, as scribed by Amairani Maloney in my presence, and it is both accurate and complete. I also acknowledge and confirm the content of the note done by Scribe #1.          Clinical Impression       ICD-10-CM ICD-9-CM   1. Symptomatic anemia  D64.9 285.9   2. BELEN (acute kidney injury)  N17.9 584.9       Disposition:   Disposition: Placed in Observation  Condition: Fair    Portions of this note may have been created with voice recognition software. Occasional "wrong-word" or "sound-a-like" substitutions may have occurred due to the inherent limitations of voice recognition software. Please, read the note carefully and recognize, using context, where substitutions have occurred.          Sruthi Kraus MD  09/12/20 0058    "

## 2020-09-11 NOTE — SUBJECTIVE & OBJECTIVE
Past Medical History:   Diagnosis Date    *Atrial fibrillation     Anemia due to blood loss, chronic 10/15/2013    Anxiety     Bilateral carotid artery disease 6/2/2016    CHF (congestive heart failure)     Depression     Hyperlipidemia     Hypertension     Prostate hypertrophy     Sleep apnea 9/12/2013    Stroke     Venous insufficiency 9/12/2013       Past Surgical History:   Procedure Laterality Date    APPENDECTOMY      CATARACT EXTRACTION, BILATERAL  08/2013    CHOLECYSTECTOMY      COLON SURGERY      diverticulitis    EYE SURGERY  08/2013    cataract    HEMORRHOID SURGERY      JOINT REPLACEMENT      left hip    WOUND DEBRIDEMENT         Review of patient's allergies indicates:   Allergen Reactions    Ativan [lorazepam]      Worsening psychosis reported when taken for agitation per daughter's report       No current facility-administered medications on file prior to encounter.      Current Outpatient Medications on File Prior to Encounter   Medication Sig    aspirin (ECOTRIN) 81 MG EC tablet Take 1 tablet (81 mg total) by mouth once daily.    diltiaZEM (CARDIZEM CD) 120 MG Cp24 Take 1 capsule (120 mg total) by mouth once daily.    donepeziL (ARICEPT) 5 MG tablet Take 5 mg by mouth every evening.    ferrous sulfate (FEOSOL) 325 mg (65 mg iron) Tab tablet Take 325 mg by mouth daily with breakfast.    furosemide (LASIX) 40 MG tablet Take 1 tablet (40 mg total) by mouth once daily.    LORazepam (ATIVAN) 1 MG tablet Take 1 tablet (1 mg total) by mouth 2 (two) times daily as needed for Anxiety.    metoprolol tartrate (LOPRESSOR) 25 MG tablet Take 1 tablet (25 mg total) by mouth 2 (two) times daily.    metronidazole 1% (METROGEL) 1 % Gel Apply topically 2 (two) times a day.    miconazole NITRATE 2 % (MICOTIN) 2 % top powder Apply topically 2 (two) times daily.    miconazole nitrate 2% (MICOTIN) 2 % Oint Apply topically 2 (two) times daily.    naproxen sodium (ANAPROX) 220 MG tablet Take  220 mg by mouth 2 (two) times daily as needed.    OLANZapine (ZYPREXA) 2.5 MG tablet Take 2.5 mg by mouth every evening.    oxybutynin (DITROPAN-XL) 10 MG 24 hr tablet Take 1 tablet (10 mg total) by mouth once daily.    pantoprazole (PROTONIX) 40 MG tablet Take 1 tablet (40 mg total) by mouth once daily.    [DISCONTINUED] apixaban (ELIQUIS) 2.5 mg Tab Take by mouth 2 (two) times daily.     Family History     Problem Relation (Age of Onset)    Diabetes Mother, Sister, Sister, Sister    Heart disease Mother    Hypertension Brother        Tobacco Use    Smoking status: Former Smoker     Packs/day: 0.50     Years: 20.00     Pack years: 10.00     Types: Cigarettes     Quit date: 1998     Years since quittin.7    Smokeless tobacco: Never Used   Substance and Sexual Activity    Alcohol use: Not Currently     Comment: social use of alcohol    Drug use: No    Sexual activity: Not Currently     Birth control/protection: None     Review of Systems   Unable to perform ROS: Dementia     Objective:     Vital Signs (Most Recent):  Temp: 99.1 °F (37.3 °C) (20)  Pulse: (!) 120 (20)  Resp: (!) 25 (20)  BP: 115/67 (20)  SpO2: 96 % (20) Vital Signs (24h Range):  Temp:  [98.5 °F (36.9 °C)-99.1 °F (37.3 °C)] 99.1 °F (37.3 °C)  Pulse:  [] 120  Resp:  [21-25] 25  SpO2:  [95 %-97 %] 96 %  BP: ()/(57-92) 115/67     Weight: 82.1 kg (181 lb)  Body mass index is 27.12 kg/m².    Physical Exam  Vitals signs and nursing note reviewed.   Constitutional:       Appearance: Normal appearance. He is well-developed.   HENT:      Head: Normocephalic and atraumatic.   Eyes:      Pupils: Pupils are equal, round, and reactive to light.   Neck:      Musculoskeletal: Normal range of motion and neck supple.   Cardiovascular:      Rate and Rhythm: Tachycardia present. Rhythm irregularly irregular.      Pulses: Normal pulses.      Heart sounds: Normal heart sounds.   Pulmonary:       Effort: Pulmonary effort is normal. No tachypnea, accessory muscle usage or respiratory distress.      Breath sounds: Normal breath sounds.   Abdominal:      General: Bowel sounds are normal.      Palpations: Abdomen is soft.      Tenderness: There is no abdominal tenderness.   Musculoskeletal: Normal range of motion.   Skin:     General: Skin is warm and dry.      Capillary Refill: Capillary refill takes less than 2 seconds.   Neurological:      Mental Status: He is alert. He is disoriented.      Deep Tendon Reflexes: Reflexes are normal and symmetric.   Psychiatric:         Speech: Speech normal.         Behavior: Behavior normal.         Thought Content: Thought content normal.         Judgment: Judgment normal.            Significant Labs:   BMP:   Recent Labs   Lab 09/11/20  1434   *      K 3.2*      CO2 27   BUN 31*   CREATININE 1.8*   CALCIUM 8.0*     CBC:   Recent Labs   Lab 09/11/20  1434   WBC 3.02*   HGB 6.6*   HCT 21.9*   *     CMP:   Recent Labs   Lab 09/11/20  1434      K 3.2*      CO2 27   *   BUN 31*   CREATININE 1.8*   CALCIUM 8.0*   PROT 11.1*   ALBUMIN 1.9*   BILITOT 0.2   ALKPHOS 70   AST 17   ALT 10   ANIONGAP 7*   EGFRNONAA 35*     All pertinent labs within the past 24 hours have been reviewed.    Significant Imaging:   Imaging Results          CT Abdomen Pelvis  Without Contrast (In process)    Procedure changed from CT Enterography Abd_Pelvis With Contrast

## 2020-09-11 NOTE — ASSESSMENT & PLAN NOTE
9/11:  Likely prerenal due to anemia  Cont to monitor  Was given 1 L fluid bolus  pRBC to be transfused should help   Renal functions

## 2020-09-12 PROBLEM — S22.070A COMPRESSION FRACTURE OF T9 VERTEBRA: Status: ACTIVE | Noted: 2020-09-12

## 2020-09-12 LAB
ANION GAP SERPL CALC-SCNC: 6 MMOL/L (ref 8–16)
BLD PROD TYP BPU: NORMAL
BLOOD UNIT EXPIRATION DATE: NORMAL
BLOOD UNIT TYPE CODE: 6200
BLOOD UNIT TYPE: NORMAL
BUN SERPL-MCNC: 25 MG/DL (ref 8–23)
CALCIUM SERPL-MCNC: 7.7 MG/DL (ref 8.7–10.5)
CHLORIDE SERPL-SCNC: 104 MMOL/L (ref 95–110)
CO2 SERPL-SCNC: 25 MMOL/L (ref 23–29)
CODING SYSTEM: NORMAL
COMPLEXED PSA SERPL-MCNC: 0.33 NG/ML (ref 0–4)
CREAT SERPL-MCNC: 1.6 MG/DL (ref 0.5–1.4)
DISPENSE STATUS: NORMAL
EST. GFR  (AFRICAN AMERICAN): 46 ML/MIN/1.73 M^2
EST. GFR  (NON AFRICAN AMERICAN): 40 ML/MIN/1.73 M^2
FERRITIN SERPL-MCNC: 477 NG/ML (ref 20–300)
FOLATE SERPL-MCNC: 6.2 NG/ML (ref 4–24)
GLUCOSE SERPL-MCNC: 76 MG/DL (ref 70–110)
HAPTOGLOB SERPL-MCNC: 196 MG/DL (ref 30–250)
HCT VFR BLD AUTO: 22.7 % (ref 40–54)
HCT VFR BLD AUTO: 26.4 % (ref 40–54)
HCT VFR BLD AUTO: 27.6 % (ref 40–54)
HGB BLD-MCNC: 7.1 G/DL (ref 14–18)
HGB BLD-MCNC: 8.3 G/DL (ref 14–18)
HGB BLD-MCNC: 8.4 G/DL (ref 14–18)
IRON SERPL-MCNC: 65 UG/DL (ref 45–160)
MAGNESIUM SERPL-MCNC: 1.5 MG/DL (ref 1.6–2.6)
NUM UNITS TRANS PACKED RBC: NORMAL
POTASSIUM SERPL-SCNC: 3.2 MMOL/L (ref 3.5–5.1)
SATURATED IRON: 34 % (ref 20–50)
SODIUM SERPL-SCNC: 135 MMOL/L (ref 136–145)
T3FREE SERPL-MCNC: 1.7 PG/ML (ref 2.3–4.2)
TOTAL IRON BINDING CAPACITY: 191 UG/DL (ref 250–450)
TRANSFERRIN SERPL-MCNC: 129 MG/DL (ref 200–375)
TSH SERPL DL<=0.005 MIU/L-ACNC: 1.56 UIU/ML (ref 0.4–4)
VIT B12 SERPL-MCNC: 397 PG/ML (ref 210–950)

## 2020-09-12 PROCEDURE — 82607 VITAMIN B-12: CPT | Mod: HCNC

## 2020-09-12 PROCEDURE — 84165 PATHOLOGIST INTERPRETATION SPE: ICD-10-PCS | Mod: 26,HCNC,, | Performed by: PATHOLOGY

## 2020-09-12 PROCEDURE — 85014 HEMATOCRIT: CPT | Mod: 91,HCNC

## 2020-09-12 PROCEDURE — 82746 ASSAY OF FOLIC ACID SERUM: CPT | Mod: HCNC

## 2020-09-12 PROCEDURE — 83735 ASSAY OF MAGNESIUM: CPT | Mod: HCNC

## 2020-09-12 PROCEDURE — 86334 PATHOLOGIST INTERPRETATION IFE: ICD-10-PCS | Mod: 26,HCNC,, | Performed by: PATHOLOGY

## 2020-09-12 PROCEDURE — 84443 ASSAY THYROID STIM HORMONE: CPT | Mod: HCNC

## 2020-09-12 PROCEDURE — 99214 PR OFFICE/OUTPT VISIT, EST, LEVL IV, 30-39 MIN: ICD-10-PCS | Mod: HCNC,,, | Performed by: INTERNAL MEDICINE

## 2020-09-12 PROCEDURE — 92610 EVALUATE SWALLOWING FUNCTION: CPT | Mod: HCNC

## 2020-09-12 PROCEDURE — 84153 ASSAY OF PSA TOTAL: CPT | Mod: HCNC

## 2020-09-12 PROCEDURE — 86334 IMMUNOFIX E-PHORESIS SERUM: CPT | Mod: 26,HCNC,, | Performed by: PATHOLOGY

## 2020-09-12 PROCEDURE — 25000003 PHARM REV CODE 250: Mod: HCNC | Performed by: FAMILY MEDICINE

## 2020-09-12 PROCEDURE — 86334 IMMUNOFIX E-PHORESIS SERUM: CPT | Mod: HCNC

## 2020-09-12 PROCEDURE — 96372 THER/PROPH/DIAG INJ SC/IM: CPT

## 2020-09-12 PROCEDURE — 83540 ASSAY OF IRON: CPT | Mod: HCNC

## 2020-09-12 PROCEDURE — 82728 ASSAY OF FERRITIN: CPT | Mod: HCNC

## 2020-09-12 PROCEDURE — P9016 RBC LEUKOCYTES REDUCED: HCPCS | Mod: HCNC

## 2020-09-12 PROCEDURE — 63600175 PHARM REV CODE 636 W HCPCS: Mod: HCNC | Performed by: NURSE PRACTITIONER

## 2020-09-12 PROCEDURE — 25500020 PHARM REV CODE 255: Mod: HCNC | Performed by: FAMILY MEDICINE

## 2020-09-12 PROCEDURE — 96375 TX/PRO/DX INJ NEW DRUG ADDON: CPT

## 2020-09-12 PROCEDURE — 84165 PROTEIN E-PHORESIS SERUM: CPT | Mod: 26,HCNC,, | Performed by: PATHOLOGY

## 2020-09-12 PROCEDURE — 36430 TRANSFUSION BLD/BLD COMPNT: CPT | Mod: HCNC

## 2020-09-12 PROCEDURE — 84165 PROTEIN E-PHORESIS SERUM: CPT | Mod: HCNC

## 2020-09-12 PROCEDURE — 99214 OFFICE O/P EST MOD 30 MIN: CPT | Mod: HCNC,,, | Performed by: INTERNAL MEDICINE

## 2020-09-12 PROCEDURE — G0378 HOSPITAL OBSERVATION PER HR: HCPCS | Mod: HCNC

## 2020-09-12 PROCEDURE — 84481 FREE ASSAY (FT-3): CPT | Mod: HCNC

## 2020-09-12 PROCEDURE — 27000221 HC OXYGEN, UP TO 24 HOURS: Mod: HCNC

## 2020-09-12 PROCEDURE — 96374 THER/PROPH/DIAG INJ IV PUSH: CPT

## 2020-09-12 PROCEDURE — 85018 HEMOGLOBIN: CPT | Mod: HCNC

## 2020-09-12 PROCEDURE — 36415 COLL VENOUS BLD VENIPUNCTURE: CPT | Mod: HCNC

## 2020-09-12 PROCEDURE — 80048 BASIC METABOLIC PNL TOTAL CA: CPT | Mod: HCNC

## 2020-09-12 PROCEDURE — 83010 ASSAY OF HAPTOGLOBIN QUANT: CPT | Mod: HCNC

## 2020-09-12 PROCEDURE — 83520 IMMUNOASSAY QUANT NOS NONAB: CPT | Mod: HCNC

## 2020-09-12 RX ORDER — POTASSIUM CHLORIDE 7.45 MG/ML
10 INJECTION INTRAVENOUS
Status: COMPLETED | OUTPATIENT
Start: 2020-09-12 | End: 2020-09-12

## 2020-09-12 RX ORDER — MAGNESIUM SULFATE HEPTAHYDRATE 40 MG/ML
2 INJECTION, SOLUTION INTRAVENOUS ONCE
Status: COMPLETED | OUTPATIENT
Start: 2020-09-12 | End: 2020-09-12

## 2020-09-12 RX ORDER — POTASSIUM CHLORIDE 20 MEQ/1
40 TABLET, EXTENDED RELEASE ORAL ONCE
Status: DISCONTINUED | OUTPATIENT
Start: 2020-09-12 | End: 2020-09-12

## 2020-09-12 RX ADMIN — PANTOPRAZOLE SODIUM 40 MG: 40 TABLET, DELAYED RELEASE ORAL at 08:09

## 2020-09-12 RX ADMIN — DONEPEZIL HYDROCHLORIDE 5 MG: 5 TABLET, FILM COATED ORAL at 09:09

## 2020-09-12 RX ADMIN — OLANZAPINE 2.5 MG: 2.5 TABLET, FILM COATED ORAL at 09:09

## 2020-09-12 RX ADMIN — FERROUS SULFATE TAB EC 325 MG (65 MG FE EQUIVALENT) 325 MG: 325 (65 FE) TABLET DELAYED RESPONSE at 09:09

## 2020-09-12 RX ADMIN — OLANZAPINE 2.5 MG: 2.5 TABLET, FILM COATED ORAL at 01:09

## 2020-09-12 RX ADMIN — OXYBUTYNIN CHLORIDE 10 MG: 5 TABLET, EXTENDED RELEASE ORAL at 08:09

## 2020-09-12 RX ADMIN — METOPROLOL TARTRATE 25 MG: 25 TABLET, FILM COATED ORAL at 08:09

## 2020-09-12 RX ADMIN — POTASSIUM CHLORIDE 10 MEQ: 7.46 INJECTION, SOLUTION INTRAVENOUS at 12:09

## 2020-09-12 RX ADMIN — DILTIAZEM HYDROCHLORIDE 120 MG: 120 CAPSULE, COATED, EXTENDED RELEASE ORAL at 08:09

## 2020-09-12 RX ADMIN — FERROUS SULFATE TAB EC 325 MG (65 MG FE EQUIVALENT) 325 MG: 325 (65 FE) TABLET DELAYED RESPONSE at 08:09

## 2020-09-12 RX ADMIN — POTASSIUM CHLORIDE 10 MEQ: 7.46 INJECTION, SOLUTION INTRAVENOUS at 11:09

## 2020-09-12 RX ADMIN — FUROSEMIDE 40 MG: 40 TABLET ORAL at 08:09

## 2020-09-12 RX ADMIN — METOPROLOL TARTRATE 25 MG: 25 TABLET, FILM COATED ORAL at 09:09

## 2020-09-12 RX ADMIN — MAGNESIUM SULFATE 2 G: 2 INJECTION INTRAVENOUS at 12:09

## 2020-09-12 RX ADMIN — IOHEXOL 100 ML: 350 INJECTION, SOLUTION INTRAVENOUS at 10:09

## 2020-09-12 NOTE — SUBJECTIVE & OBJECTIVE
Interval History: No reports of bleeding overnight, H/H improved from 7.1/22.7 yesterday to 8.4/26/6 post transfusion of 2 units PRBC. CT abd/pelvis showed several osseous soft tissue lesions are identified within the T9 vertebral body, right proximal T9 rib and involving the left transverse process and pedicle of the L4 vertebral body concerning for metastatic multiple myeloma. Hem/onc consulted for recommendations. GI and Hem/onc recommended placement of an IVC filter due to the patient not being a candidate anticoagulation with history of GI bleed. Findings were discussed with the patients daughter Kenya Soriano who is his surrogate decision maker. She will discuss findings with the rest of the family.       Review of Systems   Unable to perform ROS: Dementia     Objective:     Vital Signs (Most Recent):  Temp: 97.5 °F (36.4 °C) (09/12/20 0727)  Pulse: 97 (09/12/20 0727)  Resp: 18 (09/12/20 0727)  BP: 115/72 (09/12/20 0727)  SpO2: 99 % (09/12/20 0727) Vital Signs (24h Range):  Temp:  [97.5 °F (36.4 °C)-99 °F (37.2 °C)] 97.5 °F (36.4 °C)  Pulse:  [] 97  Resp:  [16-22] 18  SpO2:  [95 %-100 %] 99 %  BP: (114-135)/(64-96) 115/72     Weight: 82.1 kg (181 lb)  Body mass index is 27.12 kg/m².    Intake/Output Summary (Last 24 hours) at 9/12/2020 1640  Last data filed at 9/12/2020 1202  Gross per 24 hour   Intake 3190.42 ml   Output 1 ml   Net 3189.42 ml      Physical Exam  Vitals signs and nursing note reviewed.   Constitutional:       Appearance: Normal appearance. He is well-developed.   HENT:      Head: Normocephalic and atraumatic.   Eyes:      Conjunctiva/sclera: Conjunctivae normal.      Pupils: Pupils are equal, round, and reactive to light.   Neck:      Musculoskeletal: Normal range of motion and neck supple.   Cardiovascular:      Rate and Rhythm: Tachycardia present. Rhythm irregularly irregular.      Pulses: Normal pulses.      Heart sounds: Normal heart sounds. No murmur.   Pulmonary:      Effort:  Pulmonary effort is normal. No tachypnea, accessory muscle usage or respiratory distress.      Breath sounds: Normal breath sounds.   Abdominal:      General: Bowel sounds are normal. There is no distension.      Palpations: Abdomen is soft.      Tenderness: There is no abdominal tenderness.   Musculoskeletal: Normal range of motion.         General: No tenderness or deformity.   Skin:     General: Skin is warm and dry.      Capillary Refill: Capillary refill takes less than 2 seconds.      Findings: No erythema or rash.   Neurological:      Mental Status: He is alert. He is disoriented.      Deep Tendon Reflexes: Reflexes are normal and symmetric.   Psychiatric:         Speech: Speech normal.         Behavior: Behavior normal.         Thought Content: Thought content normal.         Judgment: Judgment normal.         Significant Labs: All pertinent labs within the past 24 hours have been reviewed.    Significant Imaging:   Imaging Results    None

## 2020-09-12 NOTE — PLAN OF CARE
Fall prevention precautions maintained, pt remained free of falls throughout shift, call bell and personal items within reach, no complaints of pain at this time, 2 units of RBC received, CT of abdomen/pelvis completed, pt turned Q2 hours with pillow. 24 hour chart check completed. Will continue to monitor

## 2020-09-12 NOTE — CONSULTS
[facility] -   Gastroenterology  Consult Note    Patient Name: Jatin Kohler  MRN: [id number]  Admission Date: 9/11/2020  Hospital Length of Stay: 0 days  Code Status: Full Code   Attending Provider: Mukund Mae MD   Consulting Provider: Clarissa Daly MD  Primary Care Physician: Addi Saldaña MD  Principal Problem:Acute blood loss anemia    Consults  Subjective:     HPI: 80 y.o male with multiple medical problems including CHF, HTN, dyslipidemia, CVA, PVD and Afib whom was diagnosed with a  DVT of LLE in June 2020 at [facility]. Patient was discharged on anticoagulation to [facility] for 24 hour care. He did well until last month when he was hospitalized for profound anemia at [facility] from 8/19/20 to 8/24/20. During this hospitalization he was transfused and GI was consulted. He under both a EGD and colonoscopy that was notable for a normal upper endoscopy and colonoscopy identified severe diverticulosis and evidence of sigmoid resection likely also due to severe diverticulosis. He was discharged back to [facility] with instructions not to restart his anticoagulation right away. It is unclear when the Eliquis was restarted at the nursing facility.     According to Pati Banerjee NP who recently saw Mr. Kohler at [facility] on 9/3/20, patient exhibited no signs of overt GI bleeding after his recent hospitalization. He was restarted on low dose Eliquis (2.5mg BID) and Hgb on 9/3 was 7.0. He also had a repeat U.S of the lower extremities that demonstrated persistent residual DVT. Patient is seen this evening and denies abdominal pain. He has no complaints. He is tolerating a slow infusing blood transfusion without incident.     Past Medical History:   Diagnosis Date    *Atrial fibrillation     Anemia due to blood loss, chronic 10/15/2013    Anxiety     Bilateral carotid artery disease 6/2/2016    CHF (congestive heart failure)     Depression     Hyperlipidemia      Hypertension     Prostate hypertrophy     Sleep apnea 2013    Stroke     Venous insufficiency 2013       Past Surgical History:   Procedure Laterality Date    APPENDECTOMY      CATARACT EXTRACTION, BILATERAL  2013    CHOLECYSTECTOMY      COLON SURGERY      diverticulitis    EYE SURGERY  2013    cataract    HEMORRHOID SURGERY      JOINT REPLACEMENT      left hip    WOUND DEBRIDEMENT         Review of patient's allergies indicates:   Allergen Reactions    Ativan [lorazepam]      Worsening psychosis reported when taken for agitation per daughter's report     Family History     Problem Relation (Age of Onset)    Diabetes Mother, Sister, Sister, Sister    Heart disease Mother    Hypertension Brother        Tobacco Use    Smoking status: Former Smoker     Packs/day: 0.50     Years: 20.00     Pack years: 10.00     Types: Cigarettes     Quit date: 1998     Years since quittin.7    Smokeless tobacco: Never Used   Substance and Sexual Activity    Alcohol use: Not Currently     Comment: social use of alcohol    Drug use: No    Sexual activity: Not Currently     Birth control/protection: None     Review of Systems  Objective:     Vital Signs (Most Recent):  Temp: 98.5 °F (36.9 °C) (20)  Pulse: 109 (20)  Resp: 16 (20)  BP: 118/64 (20)  SpO2: 95 % (20 1848) Vital Signs (24h Range):  Temp:  [98.5 °F (36.9 °C)-99 °F (37.2 °C)] 98.5 °F (36.9 °C)  Pulse:  [] 109  Resp:  [16-22] 16  SpO2:  [95 %-98 %] 95 %  BP: ()/(57-92) 118/64     Weight: 82.1 kg (181 lb) (20 1407)  Body mass index is 27.12 kg/m².      Intake/Output Summary (Last 24 hours) at 2020  Last data filed at 2020 1812  Gross per 24 hour   Intake 1000 ml   Output --   Net 1000 ml       Lines/Drains/Airways     Peripheral Intravenous Line                 Peripheral IV - Single Lumen 20 1431 20 G Right Antecubital less than 1 day                 Physical Exam  Constitutional:       Appearance: Normal appearance. He is normal weight.   Cardiovascular:      Rate and Rhythm: Rhythm irregular.      Heart sounds: Heart sounds are distant.   Abdominal:      General: Abdomen is flat. Bowel sounds are normal.      Palpations: Abdomen is soft.      Tenderness: There is no abdominal tenderness.   Skin:     General: Skin is warm and dry.      Findings: Bruising present.          Neurological:      Mental Status: He is alert.   Psychiatric:         Attention and Perception: Attention and perception normal.         Mood and Affect: Mood and affect normal.         Speech: Speech normal.         Behavior: Behavior normal. Behavior is cooperative.         Cognition and Memory: Cognition is impaired.         Judgment: Judgment normal.         Significant Labs:  CBC:   Recent Labs   Lab 09/11/20  1434   WBC 3.02*   HGB 6.6*   HCT 21.9*   *     CMP:   Recent Labs   Lab 09/11/20  1434   *   CALCIUM 8.0*   ALBUMIN 1.9*   PROT 11.1*      K 3.2*   CO2 27      BUN 31*   CREATININE 1.8*   ALKPHOS 70   ALT 10   AST 17   BILITOT 0.2     Coagulation:   Recent Labs   Lab 09/11/20  1434   INR 1.1   APTT 25.1       Significant Imaging:  Imaging results within the past 24 hours have been reviewed.    Assessment/Plan:     Active Diagnoses:    Diagnosis Date Noted POA    PRINCIPAL PROBLEM:  Acute blood loss anemia [D62] 09/11/2020 Yes    BELEN (acute kidney injury) [N17.9] 09/11/2020 Yes    Chronic anticoagulation [Z79.01] 09/11/2020 Not Applicable    Dementia [F03.90] 09/11/2020 Yes    Atrial fibrillation with controlled ventricular response [I48.91] 02/03/2015 Yes     Chronic      Problems Resolved During this Admission:       A: 80 y.o male with obscure GI bleed.    Recommendations:  1. Agree with transfusion, will likely need a second unit.  2. Continue to hold Eliquis  3. Consider CT enterography to rule out small bowel mass  4. Defer repeating  endoscopic procedures performed less than one month ago  5. Outpatient capsule endoscopy  6. Heme consult to discuss IVC filter for residual LLE DVT since patient cannot continue Eliquis due to obscure GI bleed.    Thank you for your consult. I will follow-up with patient. Please contact us if you have any additional questions. Discussed with Quique Edmonds, [facility] medicine.    Clarissa Daly MD  Gastroenterology  [facility] -

## 2020-09-12 NOTE — ASSESSMENT & PLAN NOTE
9/11:  Will need to discontinue blood thinners  9/12/20 Discussed findings with the patients daughter and the likely need for IVC filter placement. She will discuss with family and make a decision

## 2020-09-12 NOTE — PROGRESS NOTES
[facility]  Gastroenterology  Progress Note    Patient Name: Jatin Kohler  MRN: [id number]  Admission Date: 9/11/2020  Hospital Length of Stay: 0 days  Code Status: Full Code   Attending Provider: Mukund Mae MD  Consulting Provider: Clarissa Daly MD  Primary Care Physician: Addi Saldaña MD  Principal Problem: Acute blood loss anemia    Subjective:     Interval History: no complaints. CTE completed. STAT report available this morning. It notes possibly minimal thickening in the distal rectosigmoid colon and colonic diverticula without diverticulitis. Sutures in the distal colon. Also there are metastasis multiple myeloma with pathological fractures noted in the T9/T10 and right 9th rib.     Patient is seen this morning and endorses back pain. No abdominal pain. Tolerating PO. He was transfused 2U pRBCs overnight.    Review of Systems  Objective:     Vital Signs (Most Recent):  Temp: 97.5 °F (36.4 °C) (09/12/20 0727)  Pulse: 97 (09/12/20 0727)  Resp: 18 (09/12/20 0727)  BP: 115/72 (09/12/20 0727)  SpO2: 99 % (09/12/20 0727) Vital Signs (24h Range):  Temp:  [97.5 °F (36.4 °C)-99 °F (37.2 °C)] 97.5 °F (36.4 °C)  Pulse:  [] 97  Resp:  [16-22] 18  SpO2:  [95 %-100 %] 99 %  BP: ()/(57-96) 115/72     Weight: 82.1 kg (181 lb) (09/12/20 0115)  Body mass index is 27.12 kg/m².      Intake/Output Summary (Last 24 hours) at 9/12/2020 1025  Last data filed at 9/12/2020 0900  Gross per 24 hour   Intake 2950.42 ml   Output 1 ml   Net 2949.42 ml       Lines/Drains/Airways     Peripheral Intravenous Line                 Peripheral IV - Single Lumen 09/11/20 1431 20 G Right Antecubital less than 1 day                Physical Exam  Constitutional:       Appearance: He is obese.   Abdominal:      General: Abdomen is flat. There is no distension.      Palpations: Abdomen is soft.      Tenderness: There is no abdominal tenderness.   Skin:     General: Skin is warm and dry.   Neurological:      Mental Status:  He is alert.   Psychiatric:         Mood and Affect: Mood normal.         Behavior: Behavior normal.         Thought Content: Thought content normal.         Significant Labs:  CBC:   Recent Labs   Lab 09/11/20  1434 09/11/20  2355 09/12/20  0833   WBC 3.02*  --   --    HGB 6.6* 7.1* 8.3*   HCT 21.9* 22.7* 26.4*   *  --   --          Significant Imaging:  Imaging results within the past 24 hours have been reviewed.    Assessment/Plan:     Active Diagnoses:    Diagnosis Date Noted POA    PRINCIPAL PROBLEM:  Acute blood loss anemia [D62] 09/11/2020 Yes    BELEN (acute kidney injury) [N17.9] 09/11/2020 Yes    Chronic anticoagulation [Z79.01] 09/11/2020 Not Applicable    Dementia [F03.90] 09/11/2020 Yes    Atrial fibrillation with controlled ventricular response [I48.91] 02/03/2015 Yes     Chronic      Problems Resolved During this Admission:       A: 80 y.o male with obscure GI bleed and imaging concerning for metastatic multiple myeloma.    Recommendations:  1. Repeat U/S of LLE to confirm residual DVT  2. IVC filter  3. No Eliquis or other blood thinners  4. Heme input for new findings concerning for metastatic multiple myeloma  5. Contact family for possible family conference    Discussed with Dr. Mae, [facility] medicine. I will follow-up with patient. Please contact us if you have any additional questions.    Clarissa Daly MD  Gastroenterology  [facility] -

## 2020-09-12 NOTE — ASSESSMENT & PLAN NOTE
9/11:  Secondary to GI bleed  GI evaluated pt  Per report pt has had EGD and cscope at [facility]  GI planning for outpatient capsule endoscopy  Will transfuse 1 unit  Continue to monitor h/h q8h  Transfuse hgb < 8  Discontinue eliquis  9/12/20 No reports of bleeding overnight, H/H improved from 7.1/22.7 yesterday to 8.4/26/6 post transfusion of 2 units PRBC. CT abd/pelvis showed several osseous soft tissue lesions are identified within the T9 vertebral body, right proximal T9 rib and involving the left transverse process and pedicle of the L4 vertebral body concerning for metastatic multiple myeloma. Hem/onc consulted for recommendations. GI and Hem/onc recommended placement of an IVC filter due to the patient not being a candidate anticoagulation with history of GI bleed. Findings were discussed with the patients daughter Kenya Soriano who is his surrogate decision maker. She will discuss findings with the rest of the family and make a decision on how they wish to proceed.

## 2020-09-12 NOTE — PROGRESS NOTES
[facility] Hospital Medicine  Progress Note    Patient Name: Jatin Kohler  MRN: [id number]  Patient Class: OP- Observation   Admission Date: 9/11/2020  Length of Stay: 0 days  Attending Physician: Mukund Mae MD  Primary Care Provider: Addi Saldaña MD        Subjective:     Principal Problem:Acute blood loss anemia        HPI:  Jatin Kohler is a 80 y.o. male patient who presents to the Emergency Department for evaluation of low H&H. Patient found sitting alone in the lobby and is unable to explain his reason for visiting the ED. Paperwork regarding patient reports he is a resident of [facility] sent for further evaluation of H&H of 6.4/19.7. Information provided by Daughter. ED workup showed: CBC showed H/H 6.6/21.9, platelets of 142. CMP showed K+ of 3.2, BUN of 31, Creatinine of 1.8, CT abdomen pending. Patient placed in observation for acute blood loss anemia and BELEN.     Overview/Hospital Course:  9/12/20 No reports of bleeding overnight, H/H improved from 7.1/22.7 yesterday to 8.4/26/6 post transfusion of 2 units PRBC. CT abd/pelvis showed several osseous soft tissue lesions are identified within the T9 vertebral body, right proximal T9 rib and involving the left transverse process and pedicle of the L4 vertebral body concerning for metastatic multiple myeloma. Hem/onc consulted for recommendations. GI and Hem/onc recommended placement of an IVC filter due to the patient not being a candidate anticoagulation with history of GI bleed. Findings were discussed with the patients daughter Kenya Soriano who is his surrogate decision maker. She will discuss findings with the rest of the family.     Interval History: No reports of bleeding overnight, H/H improved from 7.1/22.7 yesterday to 8.4/26/6 post transfusion of 2 units PRBC. CT abd/pelvis showed several osseous soft tissue lesions are identified within the T9 vertebral body, right proximal T9 rib and involving the left transverse process  and pedicle of the L4 vertebral body concerning for metastatic multiple myeloma. Hem/onc consulted for recommendations. GI and Hem/onc recommended placement of an IVC filter due to the patient not being a candidate anticoagulation with history of GI bleed. Findings were discussed with the patients daughter Kenya Soriano who is his surrogate decision maker. She will discuss findings with the rest of the family.       Review of Systems   Unable to perform ROS: Dementia     Objective:     Vital Signs (Most Recent):  Temp: 97.5 °F (36.4 °C) (09/12/20 0727)  Pulse: 97 (09/12/20 0727)  Resp: 18 (09/12/20 0727)  BP: 115/72 (09/12/20 0727)  SpO2: 99 % (09/12/20 0727) Vital Signs (24h Range):  Temp:  [97.5 °F (36.4 °C)-99 °F (37.2 °C)] 97.5 °F (36.4 °C)  Pulse:  [] 97  Resp:  [16-22] 18  SpO2:  [95 %-100 %] 99 %  BP: (114-135)/(64-96) 115/72     Weight: 82.1 kg (181 lb)  Body mass index is 27.12 kg/m².    Intake/Output Summary (Last 24 hours) at 9/12/2020 1640  Last data filed at 9/12/2020 1202  Gross per 24 hour   Intake 3190.42 ml   Output 1 ml   Net 3189.42 ml      Physical Exam  Vitals signs and nursing note reviewed.   Constitutional:       Appearance: Normal appearance. He is well-developed.   HENT:      Head: Normocephalic and atraumatic.   Eyes:      Conjunctiva/sclera: Conjunctivae normal.      Pupils: Pupils are equal, round, and reactive to light.   Neck:      Musculoskeletal: Normal range of motion and neck supple.   Cardiovascular:      Rate and Rhythm: Tachycardia present. Rhythm irregularly irregular.      Pulses: Normal pulses.      Heart sounds: Normal heart sounds. No murmur.   Pulmonary:      Effort: Pulmonary effort is normal. No tachypnea, accessory muscle usage or respiratory distress.      Breath sounds: Normal breath sounds.   Abdominal:      General: Bowel sounds are normal. There is no distension.      Palpations: Abdomen is soft.      Tenderness: There is no abdominal tenderness.    Musculoskeletal: Normal range of motion.         General: No tenderness or deformity.   Skin:     General: Skin is warm and dry.      Capillary Refill: Capillary refill takes less than 2 seconds.      Findings: No erythema or rash.   Neurological:      Mental Status: He is alert. He is disoriented.      Deep Tendon Reflexes: Reflexes are normal and symmetric.   Psychiatric:         Speech: Speech normal.         Behavior: Behavior normal.         Thought Content: Thought content normal.         Judgment: Judgment normal.         Significant Labs: All pertinent labs within the past 24 hours have been reviewed.    Significant Imaging:   Imaging Results    None             Assessment/Plan:      * Acute blood loss anemia  9/11:  Secondary to GI bleed  GI evaluated pt  Per report pt has had EGD and cscope at [facility]  GI planning for outpatient capsule endoscopy  Will transfuse 1 unit  Continue to monitor h/h q8h  Transfuse hgb < 8  Discontinue eliquis  9/12/20 No reports of bleeding overnight, H/H improved from 7.1/22.7 yesterday to 8.4/26/6 post transfusion of 2 units PRBC. GI and Hem/onc recommended placement of an IVC filter due to the patient not being a candidate anticoagulation with history of GI bleed. Findings were discussed with the patients daughter Kenya Soriano who is his surrogate decision maker. She will discuss findings with the rest of the family and make a decision on how they wish to proceed.         Dementia  9/11:  Patient started on donepezil outpatient  Daughter states that pt should   Not receive any ativan for agitation due   Worsening psychosis      Chronic anticoagulation  9/11:  Will need to discontinue blood thinners  9/12/20 Discussed findings with the patients daughter and the likely need for IVC filter placement. She will discuss with family and make a decision     BELEN (acute kidney injury)  9/11:  Likely prerenal due to anemia  Cont to monitor  Was given 1 L fluid  bolus  pRBC to be transfused should help   Renal functions   9/12/20 Improving    Atrial fibrillation with controlled ventricular response  9/11:  Resume home CCB and BB  Metoprolol IV PRN     Compression fracture of T9 vertebra       9/12/20 CT abd/pelvis showed several osseous soft tissue lesions are identified within the T9 vertebral body, right proximal T9 rib and involving the left transverse process and pedicle of the L4 vertebral body concerning for metastatic multiple myeloma. Hem/onc consulted for recommendations.     VTE Risk Mitigation (From admission, onward)         Ordered     IP VTE HIGH RISK PATIENT  Once      09/11/20 1814     Place sequential compression device  Until discontinued      09/11/20 1814                Discharge Planning   BIJAL:      Code Status: Full Code   Is the patient medically ready for discharge?: No    Reason for patient still in hospital (select all that apply): Patient trending condition, Laboratory test and Treatment                     Tal Rebolledo NP  Department of Hospital Medicine   [facility]

## 2020-09-12 NOTE — HOSPITAL COURSE
9/12/20 No reports of bleeding overnight, H/H improved from 7.1/22.7 yesterday to 8.4/26/6 post transfusion of 2 units PRBC. CT abd/pelvis showed several osseous soft tissue lesions are identified within the T9 vertebral body, right proximal T9 rib and involving the left transverse process and pedicle of the L4 vertebral body concerning for metastatic multiple myeloma. Hem/onc consulted for recommendations. GI and Hem/onc recommended placement of an IVC filter due to the patient not being a candidate anticoagulation with history of GI bleed. Findings were discussed with the patients daughter Kenya Soriano who is his surrogate decision maker. She will discuss findings with the rest of the family. 9/13/20 No acute issues overnight. No reports of bleeding. Hgb remained stable after transfusion. Hem/onc consulted and recommended a bone marrow biopsy for further w/u for possible myeloma. Does not recommend IVC filter, recommends restarting anticoagulation after biopsy. Will make patient NPO after midnight for procedure tomorrow. 9/14/20 No acute issues overnight. Bone marrow biopsy cancelled today per family wishes. Palliative care consulted today and discussed plan of care with POA ([place]). Family wishes to make the patient DNR and comfort care only. The patient will likely discharge back to [facility] tomorrow with hospice. 9/15/20 No acute issues overnight. The patient and family agreed to [facility]. The patient was seen and examined today and deemed stable for discharge. The patient is being discharged back to [facility] under the care of [facility].

## 2020-09-12 NOTE — ASSESSMENT & PLAN NOTE
9/11:  Likely prerenal due to anemia  Cont to monitor  Was given 1 L fluid bolus  pRBC to be transfused should help   Renal functions   9/12/20 Improving

## 2020-09-12 NOTE — PT/OT/SLP EVAL
Speech Language Pathology Evaluation  Bedside Swallow    Patient Name:  Jatin Kohler   MRN:  [id number]  Admitting Diagnosis: Acute blood loss anemia    Recommendations:                 General Recommendations:  Modified barium swallow study  Diet recommendations:  Mechanical soft, Chopped meat, Thin(NO STRAWS)   Aspiration Precautions: Alternating bites/sips, Eliminate distractions, HOB to 90 degrees, Meds whole buried in puree, Remain upright 30 minutes post meal, Small bites/sips and Standard aspiration precautions   General Precautions: Standard, aspiration  Communication strategies:  provide increased time to answer    History:     Past Medical History:   Diagnosis Date    *Atrial fibrillation     Anemia due to blood loss, chronic 10/15/2013    Anxiety     Bilateral carotid artery disease 6/2/2016    CHF (congestive heart failure)     Depression     Hyperlipidemia     Hypertension     Prostate hypertrophy     Sleep apnea 9/12/2013    Stroke     Venous insufficiency 9/12/2013       Past Surgical History:   Procedure Laterality Date    APPENDECTOMY      CATARACT EXTRACTION, BILATERAL  08/2013    CHOLECYSTECTOMY      COLON SURGERY      diverticulitis    EYE SURGERY  08/2013    cataract    HEMORRHOID SURGERY      JOINT REPLACEMENT      left hip    WOUND DEBRIDEMENT       Subjective     Patient alert, cooperative, and seen at bedside    Pain/Comfort:  · Pain Rating 1: 0/10(related to speech and language)    Objective:     Oral Musculature Evaluation  · Oral Musculature: general weakness  · Dentition: edentulous    Bedside Swallow Eval:   Consistencies Assessed:  · Thin liquids via cup  · Puree smooth  · Solids chopped     Oral Phase:   · Excess chewing  · Prolonged mastication  · Oral residue  · Slow oral transit time    Pharyngeal Phase:   · coughing/choking  · delayed swallow initation  · throat clearing  · wet vocal quality after swallow    Treatment: Bedside swallowing evaluation completed.  Nursing reported difficulty with liquids and intake of medications. Pt edentulous with generalized weakness of oral motor musculature. Observed po intake of lunch consisting of tea via cup, ice cream. macaroni and cheese, and chicken. Pt with wet vocal quality following trials of thin liquids via cup requiring cue for cough to clear. Pt with delayed series of coughs following trials of ice cream. Pt with extended oral prep, impaired a-p transit resulting in oral stasis, and intermittent wet vocal quality with solid consistencies. Pt would benefit from MBSS to assess the efficiency and safety of the swallow due to deficits listed above.     Assessment:     Jatin Kohler is a 80 y.o. male with an SLP diagnosis of Dysphagia.  He presents with oropharyngeal dysphagia.    Goals:   Multidisciplinary Problems     SLP Goals        Problem: SLP Goal    Goal Priority Disciplines Outcome   SLP Goal     SLP    Description: TPW participate in MBSS to assess the efficiency and safety of the swallow  TPW recall and/or follow dysphagia strategies during po intake of mod cues  TPW tolerate the least restrictive diet without any overt s/s of aspiration                   Plan:     · Patient to be seen:  3 x/week   · Plan of Care expires:     · Plan of Care reviewed with:  patient, daughter   · SLP Follow-Up:  Yes       Discharge recommendations:  nursing facility, skilled   Barriers to Discharge:  Level of Skilled Assistance Needed mod    Time Tracking:     SLP Treatment Date:   09/12/20  Speech Start Time:  1152  Speech Stop Time:  1227     Speech Total Time (min):  35 min    Billable Minutes: Eval Swallow and Oral Function 33    Tasneem Vasquez CCC-SLP  09/12/2020

## 2020-09-13 LAB
ANION GAP SERPL CALC-SCNC: 6 MMOL/L (ref 8–16)
BUN SERPL-MCNC: 24 MG/DL (ref 8–23)
CALCIUM SERPL-MCNC: 7.8 MG/DL (ref 8.7–10.5)
CHLORIDE SERPL-SCNC: 104 MMOL/L (ref 95–110)
CO2 SERPL-SCNC: 28 MMOL/L (ref 23–29)
CREAT SERPL-MCNC: 1.5 MG/DL (ref 0.5–1.4)
EST. GFR  (AFRICAN AMERICAN): 50 ML/MIN/1.73 M^2
EST. GFR  (NON AFRICAN AMERICAN): 43 ML/MIN/1.73 M^2
GLUCOSE SERPL-MCNC: 80 MG/DL (ref 70–110)
HCT VFR BLD AUTO: 26.2 % (ref 40–54)
HGB BLD-MCNC: 8.4 G/DL (ref 14–18)
MAGNESIUM SERPL-MCNC: 1.8 MG/DL (ref 1.6–2.6)
POTASSIUM SERPL-SCNC: 3.2 MMOL/L (ref 3.5–5.1)
SODIUM SERPL-SCNC: 138 MMOL/L (ref 136–145)

## 2020-09-13 PROCEDURE — 63600175 PHARM REV CODE 636 W HCPCS: Mod: HCNC | Performed by: NURSE PRACTITIONER

## 2020-09-13 PROCEDURE — 99900035 HC TECH TIME PER 15 MIN (STAT): Mod: HCNC

## 2020-09-13 PROCEDURE — 83735 ASSAY OF MAGNESIUM: CPT | Mod: HCNC

## 2020-09-13 PROCEDURE — 80048 BASIC METABOLIC PNL TOTAL CA: CPT | Mod: HCNC

## 2020-09-13 PROCEDURE — 99205 PR OFFICE/OUTPT VISIT, NEW, LEVL V, 60-74 MIN: ICD-10-PCS | Mod: HCNC,,, | Performed by: INTERNAL MEDICINE

## 2020-09-13 PROCEDURE — 25000003 PHARM REV CODE 250: Mod: HCNC | Performed by: FAMILY MEDICINE

## 2020-09-13 PROCEDURE — 27000221 HC OXYGEN, UP TO 24 HOURS: Mod: HCNC

## 2020-09-13 PROCEDURE — 36415 COLL VENOUS BLD VENIPUNCTURE: CPT | Mod: HCNC

## 2020-09-13 PROCEDURE — 99214 OFFICE O/P EST MOD 30 MIN: CPT | Mod: HCNC,,, | Performed by: INTERNAL MEDICINE

## 2020-09-13 PROCEDURE — 92611 MOTION FLUOROSCOPY/SWALLOW: CPT | Mod: HCNC

## 2020-09-13 PROCEDURE — 99205 OFFICE O/P NEW HI 60 MIN: CPT | Mod: HCNC,,, | Performed by: INTERNAL MEDICINE

## 2020-09-13 PROCEDURE — G0378 HOSPITAL OBSERVATION PER HR: HCPCS | Mod: HCNC

## 2020-09-13 PROCEDURE — 99214 PR OFFICE/OUTPT VISIT, EST, LEVL IV, 30-39 MIN: ICD-10-PCS | Mod: HCNC,,, | Performed by: INTERNAL MEDICINE

## 2020-09-13 PROCEDURE — 94761 N-INVAS EAR/PLS OXIMETRY MLT: CPT | Mod: HCNC

## 2020-09-13 PROCEDURE — 96372 THER/PROPH/DIAG INJ SC/IM: CPT | Mod: HCNC

## 2020-09-13 RX ORDER — HEPARIN SODIUM 5000 [USP'U]/ML
5000 INJECTION, SOLUTION INTRAVENOUS; SUBCUTANEOUS ONCE
Status: COMPLETED | OUTPATIENT
Start: 2020-09-13 | End: 2020-09-13

## 2020-09-13 RX ADMIN — FERROUS SULFATE TAB EC 325 MG (65 MG FE EQUIVALENT) 325 MG: 325 (65 FE) TABLET DELAYED RESPONSE at 09:09

## 2020-09-13 RX ADMIN — METOPROLOL TARTRATE 25 MG: 25 TABLET, FILM COATED ORAL at 08:09

## 2020-09-13 RX ADMIN — FUROSEMIDE 40 MG: 40 TABLET ORAL at 08:09

## 2020-09-13 RX ADMIN — METOPROLOL TARTRATE 25 MG: 25 TABLET, FILM COATED ORAL at 09:09

## 2020-09-13 RX ADMIN — OLANZAPINE 2.5 MG: 2.5 TABLET, FILM COATED ORAL at 09:09

## 2020-09-13 RX ADMIN — FERROUS SULFATE TAB EC 325 MG (65 MG FE EQUIVALENT) 325 MG: 325 (65 FE) TABLET DELAYED RESPONSE at 08:09

## 2020-09-13 RX ADMIN — DONEPEZIL HYDROCHLORIDE 5 MG: 5 TABLET, FILM COATED ORAL at 09:09

## 2020-09-13 RX ADMIN — HEPARIN SODIUM 5000 UNITS: 5000 INJECTION INTRAVENOUS; SUBCUTANEOUS at 09:09

## 2020-09-13 RX ADMIN — DILTIAZEM HYDROCHLORIDE 120 MG: 120 CAPSULE, COATED, EXTENDED RELEASE ORAL at 08:09

## 2020-09-13 RX ADMIN — OXYBUTYNIN CHLORIDE 10 MG: 5 TABLET, EXTENDED RELEASE ORAL at 08:09

## 2020-09-13 RX ADMIN — PANTOPRAZOLE SODIUM 40 MG: 40 TABLET, DELAYED RELEASE ORAL at 08:09

## 2020-09-13 NOTE — SUBJECTIVE & OBJECTIVE
Oncology Treatment Plan:   [No treatment plan]    Medications:  Continuous Infusions:  Scheduled Meds:   diltiaZEM  120 mg Oral Daily    donepeziL  5 mg Oral QHS    ferrous sulfate  325 mg Oral BID    furosemide  40 mg Oral Daily    heparin (porcine)  5,000 Units Subcutaneous Once    metoprolol tartrate  25 mg Oral BID    OLANZapine  2.5 mg Oral QHS    oxybutynin  10 mg Oral Daily    pantoprazole  40 mg Oral Daily     PRN Meds:sodium chloride, barium sulfate, metoprolol, sodium chloride 0.9%     Review of patient's allergies indicates:   Allergen Reactions    Ativan [lorazepam]      Worsening psychosis reported when taken for agitation per daughter's report        Past Medical History:   Diagnosis Date    *Atrial fibrillation     Anemia due to blood loss, chronic 10/15/2013    Anxiety     Bilateral carotid artery disease 2016    CHF (congestive heart failure)     Depression     Hyperlipidemia     Hypertension     Prostate hypertrophy     Sleep apnea 2013    Stroke     Venous insufficiency 2013     Past Surgical History:   Procedure Laterality Date    APPENDECTOMY      CATARACT EXTRACTION, BILATERAL  2013    CHOLECYSTECTOMY      COLON SURGERY      diverticulitis    EYE SURGERY  2013    cataract    HEMORRHOID SURGERY      JOINT REPLACEMENT      left hip    WOUND DEBRIDEMENT       Family History     Problem Relation (Age of Onset)    Diabetes Mother, Sister, Sister, Sister    Heart disease Mother    Hypertension Brother        Tobacco Use    Smoking status: Former Smoker     Packs/day: 0.50     Years: 20.00     Pack years: 10.00     Types: Cigarettes     Quit date: 1998     Years since quittin.7    Smokeless tobacco: Never Used   Substance and Sexual Activity    Alcohol use: Not Currently     Comment: social use of alcohol    Drug use: No    Sexual activity: Not Currently     Birth control/protection: None       Review of Systems   Constitutional:  Negative.    HENT: Negative.    Eyes: Negative.    Respiratory: Negative.    Cardiovascular: Negative.    Gastrointestinal: Negative.    Endocrine: Negative.    Genitourinary: Negative.    Musculoskeletal: Negative.    Skin: Negative.    Allergic/Immunologic: Negative.    Neurological: Negative.    Hematological: Negative.    Psychiatric/Behavioral: Negative.      Objective:     Vital Signs (Most Recent):  Temp: 97.5 °F (36.4 °C) (09/13/20 1126)  Pulse: 89 (09/13/20 1126)  Resp: 18 (09/13/20 1126)  BP: 128/60 (09/13/20 1126)  SpO2: 95 % (09/13/20 1126) Vital Signs (24h Range):  Temp:  [97.5 °F (36.4 °C)-98.6 °F (37 °C)] 97.5 °F (36.4 °C)  Pulse:  [] 89  Resp:  [16-18] 18  SpO2:  [89 %-99 %] 95 %  BP: (106-157)/(55-63) 128/60     Weight: 82.1 kg (181 lb)  Body mass index is 27.12 kg/m².  Body surface area is 1.99 meters squared.      Intake/Output Summary (Last 24 hours) at 9/13/2020 1441  Last data filed at 9/13/2020 1200  Gross per 24 hour   Intake 720 ml   Output 1000 ml   Net -280 ml       Physical Exam  Vitals signs and nursing note reviewed.   Constitutional:       Appearance: He is well-developed.   HENT:      Head: Normocephalic and atraumatic.   Eyes:      Conjunctiva/sclera: Conjunctivae normal.   Neck:      Musculoskeletal: Normal range of motion and neck supple.   Cardiovascular:      Rate and Rhythm: Normal rate and regular rhythm.   Pulmonary:      Effort: Pulmonary effort is normal.      Breath sounds: Normal breath sounds.   Abdominal:      General: Bowel sounds are normal.      Palpations: Abdomen is soft.   Musculoskeletal: Normal range of motion.   Skin:     General: Skin is warm and dry.   Neurological:      Mental Status: He is alert and oriented to person, place, and time.   Psychiatric:         Behavior: Behavior normal.         Thought Content: Thought content normal.         Judgment: Judgment normal.         Significant Labs:   All pertinent labs from the last 24 hours have been  reviewed.    Diagnostic Results:  I have reviewed all pertinent imaging results/findings within the past 24 hours.

## 2020-09-13 NOTE — SUBJECTIVE & OBJECTIVE
Interval History: No acute issues overnight. No reports of bleeding. Hgb remained stable after transfusion. Hem/onc consulted and recommended a bone marrow biopsy for further w/u for possible myeloma. Does not recommend IVC filter, recommends restarting anticoagulation after biopsy. Will make patient NPO after midnight for procedure tomorrow.       Review of Systems   Unable to perform ROS: Dementia     Objective:     Vital Signs (Most Recent):  Temp: 97.5 °F (36.4 °C) (09/13/20 1506)  Pulse: 99 (09/13/20 1506)  Resp: 18 (09/13/20 1506)  BP: 108/68 (09/13/20 1506)  SpO2: 95 % (09/13/20 1506) Vital Signs (24h Range):  Temp:  [97.5 °F (36.4 °C)-98.6 °F (37 °C)] 97.5 °F (36.4 °C)  Pulse:  [] 99  Resp:  [16-18] 18  SpO2:  [95 %-99 %] 95 %  BP: (106-157)/(55-68) 108/68     Weight: 82.1 kg (181 lb)  Body mass index is 27.12 kg/m².    Intake/Output Summary (Last 24 hours) at 9/13/2020 1752  Last data filed at 9/13/2020 1600  Gross per 24 hour   Intake 720 ml   Output 1002 ml   Net -282 ml      Physical Exam  Vitals signs and nursing note reviewed.   Constitutional:       Appearance: Normal appearance. He is well-developed.   HENT:      Head: Normocephalic and atraumatic.   Eyes:      Conjunctiva/sclera: Conjunctivae normal.      Pupils: Pupils are equal, round, and reactive to light.   Neck:      Musculoskeletal: Normal range of motion and neck supple.   Cardiovascular:      Rate and Rhythm: Tachycardia present. Rhythm irregularly irregular.      Pulses: Normal pulses.      Heart sounds: Normal heart sounds. No murmur.   Pulmonary:      Effort: Pulmonary effort is normal. No tachypnea, accessory muscle usage or respiratory distress.      Breath sounds: Normal breath sounds.   Abdominal:      General: Bowel sounds are normal. There is no distension.      Palpations: Abdomen is soft.      Tenderness: There is no abdominal tenderness.   Musculoskeletal: Normal range of motion.         General: No tenderness or  deformity.   Skin:     General: Skin is warm and dry.      Capillary Refill: Capillary refill takes less than 2 seconds.      Findings: No erythema or rash.   Neurological:      Mental Status: He is alert. He is disoriented.      Deep Tendon Reflexes: Reflexes are normal and symmetric.   Psychiatric:         Speech: Speech normal.         Behavior: Behavior normal.         Thought Content: Thought content normal.         Judgment: Judgment normal.         Significant Labs: All pertinent labs within the past 24 hours have been reviewed.    Significant Imaging:   Imaging Results    None

## 2020-09-13 NOTE — ASSESSMENT & PLAN NOTE
9/11:  Will need to discontinue blood thinners  9/12/20 Discussed findings with the patients daughter and the likely need for IVC filter placement. She will discuss with family and make a decision   9/13/20 Will resume eliquis after bone marrow biopsy

## 2020-09-13 NOTE — CONSULTS
[facility]  Hematology/Oncology  Consult Note    Patient Name: Jatin Kohler  MRN: [id number]  Admission Date: 9/11/2020  Hospital Length of Stay: 0 days  Code Status: Full Code   Attending Provider: Mukund Mae MD  Consulting Provider: Amrik Márquez MD  Primary Care Physician: Addi Saldaña MD  Principal Problem:Acute blood loss anemia    Consults  Subjective:     HPI:  Mr. Jatin Kohler is a 81 yo gentleman with multiple medical problems including CHF, HTN, dyslipidemia, CVA, PVD and Afib whom was diagnosed with a  DVT of LLE in June 2020 at [facility]. Patient was discharged on anticoagulation to [facility] for 24 hour care. He did well until last month when he was hospitalized for profound anemia at [facility] from 8/19/20 to 8/24/20. During this hospitalization he was transfused and GI was consulted. He under both a EGD and colonoscopy that was notable for a normal upper endoscopy and colonoscopy identified severe diverticulosis and evidence of sigmoid resection likely also due to severe diverticulosis.     Patient found sitting alone in the lobby and is unable to explain his reason for visiting the ED. Paperwork regarding patient reports he is a resident of [facility] sent for further evaluation of H&H of 6.4/19.7. Information provided by Daughter. ED workup showed: CBC showed H/H 6.6/21.9, platelets of 142.  Was transfused 2 units RBC.    CT showed bone lesions concerning for myeloma.    Oncology Treatment Plan:   [No treatment plan]    Medications:  Continuous Infusions:  Scheduled Meds:   diltiaZEM  120 mg Oral Daily    donepeziL  5 mg Oral QHS    ferrous sulfate  325 mg Oral BID    furosemide  40 mg Oral Daily    heparin (porcine)  5,000 Units Subcutaneous Once    metoprolol tartrate  25 mg Oral BID    OLANZapine  2.5 mg Oral QHS    oxybutynin  10 mg Oral Daily    pantoprazole  40 mg Oral Daily     PRN Meds:sodium chloride, barium sulfate, metoprolol, sodium  chloride 0.9%     Review of patient's allergies indicates:   Allergen Reactions    Ativan [lorazepam]      Worsening psychosis reported when taken for agitation per daughter's report        Past Medical History:   Diagnosis Date    *Atrial fibrillation     Anemia due to blood loss, chronic 10/15/2013    Anxiety     Bilateral carotid artery disease 2016    CHF (congestive heart failure)     Depression     Hyperlipidemia     Hypertension     Prostate hypertrophy     Sleep apnea 2013    Stroke     Venous insufficiency 2013     Past Surgical History:   Procedure Laterality Date    APPENDECTOMY      CATARACT EXTRACTION, BILATERAL  2013    CHOLECYSTECTOMY      COLON SURGERY      diverticulitis    EYE SURGERY  2013    cataract    HEMORRHOID SURGERY      JOINT REPLACEMENT      left hip    WOUND DEBRIDEMENT       Family History     Problem Relation (Age of Onset)    Diabetes Mother, Sister, Sister, Sister    Heart disease Mother    Hypertension Brother        Tobacco Use    Smoking status: Former Smoker     Packs/day: 0.50     Years: 20.00     Pack years: 10.00     Types: Cigarettes     Quit date: 1998     Years since quittin.7    Smokeless tobacco: Never Used   Substance and Sexual Activity    Alcohol use: Not Currently     Comment: social use of alcohol    Drug use: No    Sexual activity: Not Currently     Birth control/protection: None       Review of Systems   Constitutional: Negative.    HENT: Negative.    Eyes: Negative.    Respiratory: Negative.    Cardiovascular: Negative.    Gastrointestinal: Negative.    Endocrine: Negative.    Genitourinary: Negative.    Musculoskeletal: Negative.    Skin: Negative.    Allergic/Immunologic: Negative.    Neurological: Negative.    Hematological: Negative.    Psychiatric/Behavioral: Negative.      Objective:     Vital Signs (Most Recent):  Temp: 97.5 °F (36.4 °C) (20 1126)  Pulse: 89 (20 1126)  Resp: 18 (20  1126)  BP: 128/60 (09/13/20 1126)  SpO2: 95 % (09/13/20 1126) Vital Signs (24h Range):  Temp:  [97.5 °F (36.4 °C)-98.6 °F (37 °C)] 97.5 °F (36.4 °C)  Pulse:  [] 89  Resp:  [16-18] 18  SpO2:  [89 %-99 %] 95 %  BP: (106-157)/(55-63) 128/60     Weight: 82.1 kg (181 lb)  Body mass index is 27.12 kg/m².  Body surface area is 1.99 meters squared.      Intake/Output Summary (Last 24 hours) at 9/13/2020 1441  Last data filed at 9/13/2020 1200  Gross per 24 hour   Intake 720 ml   Output 1000 ml   Net -280 ml       Physical Exam  Vitals signs and nursing note reviewed.   Constitutional:       Appearance: He is well-developed.   HENT:      Head: Normocephalic and atraumatic.   Eyes:      Conjunctiva/sclera: Conjunctivae normal.   Neck:      Musculoskeletal: Normal range of motion and neck supple.   Cardiovascular:      Rate and Rhythm: Normal rate and regular rhythm.   Pulmonary:      Effort: Pulmonary effort is normal.      Breath sounds: Normal breath sounds.   Abdominal:      General: Bowel sounds are normal.      Palpations: Abdomen is soft.   Musculoskeletal: Normal range of motion.   Skin:     General: Skin is warm and dry.   Neurological:      Mental Status: He is alert and oriented to person, place, and time.   Psychiatric:         Behavior: Behavior normal.         Thought Content: Thought content normal.         Judgment: Judgment normal.         Significant Labs:   All pertinent labs from the last 24 hours have been reviewed.    Diagnostic Results:  I have reviewed all pertinent imaging results/findings within the past 24 hours.    Assessment/Plan:     Compression fracture of T9 vertebra  09/03/2020  Bone lesions, anemia, and BELEN part of CRAB criteria for myeloma  SPEP, SALVADOR, light chains sent.  Would workup with bone marrow biopsy tomorrow.  After bone marrow biopsy, can resume anticoagulation for a. Fib and recent DVT.        Thank you for your consult. I will follow-up with patient. Please contact us if you  have any additional questions.    Amrik Márquez MD  Hematology/Oncology  [facility]

## 2020-09-13 NOTE — PLAN OF CARE
VSS. No adverse reactions. 2L of oxygen via NC. Turn pt q 2 hr. Blanchable redness to bottom applied with skin barrier. Skin tear to L ear covered with bandage, CDI. Will continue to monitor. Call light in reach. 12 hour chart check.

## 2020-09-13 NOTE — ASSESSMENT & PLAN NOTE
- CT abd/pelvis showed several osseous soft tissue lesions are identified within the T9 vertebral body, right proximal T9 rib and involving the left transverse process and pedicle of the L4 vertebral body concerning for metastatic multiple myeloma. Hem/onc consulted for recommendations. Analgesia PRN

## 2020-09-13 NOTE — HPI
Mr. Jatin Kohler is a 81 yo gentleman with multiple medical problems including CHF, HTN, dyslipidemia, CVA, PVD and Afib whom was diagnosed with a  DVT of LLE in June 2020 at [facility]. Patient was discharged on anticoagulation to [facility] for 24 hour care. He did well until last month when he was hospitalized for profound anemia at [facility] from 8/19/20 to 8/24/20. During this hospitalization he was transfused and GI was consulted. He under both a EGD and colonoscopy that was notable for a normal upper endoscopy and colonoscopy identified severe diverticulosis and evidence of sigmoid resection likely also due to severe diverticulosis.     Patient found sitting alone in the lobby and is unable to explain his reason for visiting the ED. Paperwork regarding patient reports he is a resident of [facility] sent for further evaluation of H&H of 6.4/19.7. Information provided by Daughter. ED workup showed: CBC showed H/H 6.6/21.9, platelets of 142.  Was transfused 2 units RBC.    CT showed bone lesions concerning for myeloma.

## 2020-09-13 NOTE — ASSESSMENT & PLAN NOTE
9/11:  Likely prerenal due to anemia  Cont to monitor  Was given 1 L fluid bolus  pRBC to be transfused should help   Renal functions   9/12/20 Improving  9/13/20 Continues to improve slowly with IV hydration

## 2020-09-13 NOTE — PROGRESS NOTES
[facility]  Gastroenterology  Progress Note    Patient Name: Jatin Kohler  MRN: [id number]  Admission Date: 9/11/2020  Hospital Length of Stay: 0 days  Code Status: Full Code   Attending Provider: Mukund Mae MD  Consulting Provider: Clarissa Daly MD  Primary Care Physician: Addi Saldaña MD  Principal Problem: Acute blood loss anemia    Subjective:     Interval History: seen by speech, daughter visited with patient yesterday, no one present today during rounds. Awaiting heme input and family decision to move forward with IVC filter and further work up given recent CT findings concerning for metastatic myeloma. U/S of the lower extremities shows no evidence of residual DVT. H&H has been stable. No overt signs of bleeding.     Review of Systems  Objective:     Vital Signs (Most Recent):  Temp: 97.6 °F (36.4 °C) (09/13/20 0733)  Pulse: 100 (09/13/20 0819)  Resp: 18 (09/13/20 0819)  BP: 121/63 (09/13/20 0733)  SpO2: 98 % (09/13/20 0819) Vital Signs (24h Range):  Temp:  [97.5 °F (36.4 °C)-98.6 °F (37 °C)] 97.6 °F (36.4 °C)  Pulse:  [] 100  Resp:  [16-18] 18  SpO2:  [89 %-99 %] 98 %  BP: (106-157)/(55-63) 121/63     Weight: 82.1 kg (181 lb) (09/12/20 0115)  Body mass index is 27.12 kg/m².      Intake/Output Summary (Last 24 hours) at 9/13/2020 1104  Last data filed at 9/13/2020 0800  Gross per 24 hour   Intake 960 ml   Output 300 ml   Net 660 ml       Lines/Drains/Airways     Peripheral Intravenous Line                 Peripheral IV - Single Lumen 09/11/20 1431 20 G Right Antecubital 1 day                Physical Exam  Constitutional:       Appearance: He is obese.   HENT:      Head: Normocephalic and atraumatic.   Abdominal:      General: There is no distension.      Palpations: Abdomen is soft.      Tenderness: There is no abdominal tenderness.   Neurological:      Mental Status: He is alert. He is disoriented.   Psychiatric:         Mood and Affect: Mood normal.         Speech: Speech normal.          Behavior: Behavior normal.         Thought Content: Thought content normal.         Cognition and Memory: Cognition is impaired.         Judgment: Judgment normal.         Significant Labs:  CBC:   Recent Labs   Lab 09/11/20  1434  09/12/20  0833 09/12/20  1549 09/12/20  2351   WBC 3.02*  --   --   --   --    HGB 6.6*   < > 8.3* 8.4* 8.4*   HCT 21.9*   < > 26.4* 27.6* 26.2*   *  --   --   --   --     < > = values in this interval not displayed.     CMP:   Recent Labs   Lab 09/11/20  1434  09/13/20  0704   *   < > 80   CALCIUM 8.0*   < > 7.8*   ALBUMIN 1.9*  --   --    PROT 11.1*  --   --       < > 138   K 3.2*   < > 3.2*   CO2 27   < > 28      < > 104   BUN 31*   < > 24*   CREATININE 1.8*   < > 1.5*   ALKPHOS 70  --   --    ALT 10  --   --    AST 17  --   --    BILITOT 0.2  --   --     < > = values in this interval not displayed.         Significant Imaging:  Imaging results within the past 24 hours have been reviewed.    Assessment/Plan:     Active Diagnoses:    Diagnosis Date Noted POA    PRINCIPAL PROBLEM:  Acute blood loss anemia [D62] 09/11/2020 Yes    Compression fracture of T9 vertebra [S22.070A] 09/12/2020 Unknown    BELEN (acute kidney injury) [N17.9] 09/11/2020 Yes    Chronic anticoagulation [Z79.01] 09/11/2020 Not Applicable    Dementia [F03.90] 09/11/2020 Yes    Atrial fibrillation with controlled ventricular response [I48.91] 02/03/2015 Yes     Chronic      Problems Resolved During this Admission:       A: 80 y.o male with anemia and imaging concerning for metastatic multiple myeloma.     Recommendations:  1. Continue to hold Eliquis or other blood thinners  2. Diet per speech recommendations  3. Continue daily PPI   4. Agree with iron supplementation, stools will be dark      Given recent CT findings, recommend deferring capsule endoscopy. Metastatic myeloma can be cause for patient's anemia. Patient had recent endoscopic evaluation (less than 2 weeks ago) at [facility]  [place] [facility]. See GI consultation for details. Await heme recommendations on further work up for myeloma including possible bone marrow biopsy if family wishes to pursue. Will sign off, please call if needed.      I will sign off. Please contact us if you have any additional questions.       Clarissa Daly MD  Gastroenterology  [facility]

## 2020-09-13 NOTE — PLAN OF CARE
Fall prevention precautions maintained, pt remained free of falls throughout shift, call bell and personal items within reach, no complaints of pain at this time, pt turned Q2 hours with pillows. 24 hour chart check completed. Will continue to monitor

## 2020-09-13 NOTE — PROGRESS NOTES
[facility] Hospital Medicine  Progress Note    Patient Name: Jatin Kohler  MRN: [id number]  Patient Class: OP- Observation   Admission Date: 9/11/2020  Length of Stay: 0 days  Attending Physician: Mukund Mae MD  Primary Care Provider: Addi Saldaña MD        Subjective:     Principal Problem:Acute blood loss anemia        HPI:  Jatin Kohler is a 80 y.o. male patient who presents to the Emergency Department for evaluation of low H&H. Patient found sitting alone in the lobby and is unable to explain his reason for visiting the ED. Paperwork regarding patient reports he is a resident of [facility] sent for further evaluation of H&H of 6.4/19.7. Information provided by Daughter. ED workup showed: CBC showed H/H 6.6/21.9, platelets of 142. CMP showed K+ of 3.2, BUN of 31, Creatinine of 1.8, CT abdomen pending. Patient placed in observation for acute blood loss anemia and BELEN.     Overview/Hospital Course:  9/12/20 No reports of bleeding overnight, H/H improved from 7.1/22.7 yesterday to 8.4/26/6 post transfusion of 2 units PRBC. CT abd/pelvis showed several osseous soft tissue lesions are identified within the T9 vertebral body, right proximal T9 rib and involving the left transverse process and pedicle of the L4 vertebral body concerning for metastatic multiple myeloma. Hem/onc consulted for recommendations. GI and Hem/onc recommended placement of an IVC filter due to the patient not being a candidate anticoagulation with history of GI bleed. Findings were discussed with the patients daughter Kenya Soriano who is his surrogate decision maker. She will discuss findings with the rest of the family. 9/13/20 No acute issues overnight. No reports of bleeding. Hgb remained stable after transfusion. Hem/onc consulted and recommended a bone marrow biopsy for further w/u for possible myeloma. Does not recommend IVC filter, recommends restarting anticoagulation after biopsy. Will make patient NPO after  midnight for procedure tomorrow.     Interval History: No acute issues overnight. No reports of bleeding. Hgb remained stable after transfusion. Hem/onc consulted and recommended a bone marrow biopsy for further w/u for possible myeloma. Does not recommend IVC filter, recommends restarting anticoagulation after biopsy. Will make patient NPO after midnight for procedure tomorrow.       Review of Systems   Unable to perform ROS: Dementia     Objective:     Vital Signs (Most Recent):  Temp: 97.5 °F (36.4 °C) (09/13/20 1506)  Pulse: 99 (09/13/20 1506)  Resp: 18 (09/13/20 1506)  BP: 108/68 (09/13/20 1506)  SpO2: 95 % (09/13/20 1506) Vital Signs (24h Range):  Temp:  [97.5 °F (36.4 °C)-98.6 °F (37 °C)] 97.5 °F (36.4 °C)  Pulse:  [] 99  Resp:  [16-18] 18  SpO2:  [95 %-99 %] 95 %  BP: (106-157)/(55-68) 108/68     Weight: 82.1 kg (181 lb)  Body mass index is 27.12 kg/m².    Intake/Output Summary (Last 24 hours) at 9/13/2020 1752  Last data filed at 9/13/2020 1600  Gross per 24 hour   Intake 720 ml   Output 1002 ml   Net -282 ml      Physical Exam  Vitals signs and nursing note reviewed.   Constitutional:       Appearance: Normal appearance. He is well-developed.   HENT:      Head: Normocephalic and atraumatic.   Eyes:      Conjunctiva/sclera: Conjunctivae normal.      Pupils: Pupils are equal, round, and reactive to light.   Neck:      Musculoskeletal: Normal range of motion and neck supple.   Cardiovascular:      Rate and Rhythm: Tachycardia present. Rhythm irregularly irregular.      Pulses: Normal pulses.      Heart sounds: Normal heart sounds. No murmur.   Pulmonary:      Effort: Pulmonary effort is normal. No tachypnea, accessory muscle usage or respiratory distress.      Breath sounds: Normal breath sounds.   Abdominal:      General: Bowel sounds are normal. There is no distension.      Palpations: Abdomen is soft.      Tenderness: There is no abdominal tenderness.   Musculoskeletal: Normal range of motion.          General: No tenderness or deformity.   Skin:     General: Skin is warm and dry.      Capillary Refill: Capillary refill takes less than 2 seconds.      Findings: No erythema or rash.   Neurological:      Mental Status: He is alert. He is disoriented.      Deep Tendon Reflexes: Reflexes are normal and symmetric.   Psychiatric:         Speech: Speech normal.         Behavior: Behavior normal.         Thought Content: Thought content normal.         Judgment: Judgment normal.         Significant Labs: All pertinent labs within the past 24 hours have been reviewed.    Significant Imaging:   Imaging Results    None             Assessment/Plan:      * Acute blood loss anemia  9/11:  Secondary to GI bleed  GI evaluated pt  Per report pt has had EGD and cscope at [facility]  GI planning for outpatient capsule endoscopy  Will transfuse 1 unit  Continue to monitor h/h q8h  Transfuse hgb < 8  Discontinue eliquis  9/12/20 No reports of bleeding overnight, H/H improved from 7.1/22.7 yesterday to 8.4/26/6 post transfusion of 2 units PRBC. CT abd/pelvis showed several osseous soft tissue lesions are identified within the T9 vertebral body, right proximal T9 rib and involving the left transverse process and pedicle of the L4 vertebral body concerning for metastatic multiple myeloma. Hem/onc consulted for recommendations. GI and Hem/onc recommended placement of an IVC filter due to the patient not being a candidate anticoagulation with history of GI bleed. Findings were discussed with the patients daughter Kenya Soriano who is his surrogate decision maker. She will discuss findings with the rest of the family and make a decision on how they wish to proceed.   9/13/20  No reports of bleeding. Hgb remained stable after transfusion. Hem/onc consulted and recommended a bone marrow biopsy for further w/u for possible myeloma. Does not recommend IVC filter, recommends restarting anticoagulation after biopsy. Will make patient  NPO after midnight for procedure tomorrow.         Compression fracture of T9 vertebra  - CT abd/pelvis showed several osseous soft tissue lesions are identified within the T9 vertebral body, right proximal T9 rib and involving the left transverse process and pedicle of the L4 vertebral body concerning for metastatic multiple myeloma. Hem/onc consulted for recommendations. Analgesia PRN    Dementia  9/11:  Patient started on donepezil outpatient  Daughter states that pt should   Not receive any ativan for agitation due   Worsening psychosis      Chronic anticoagulation  9/11:  Will need to discontinue blood thinners  9/12/20 Discussed findings with the patients daughter and the likely need for IVC filter placement. She will discuss with family and make a decision   9/13/20 Will resume eliquis after bone marrow biopsy    BELEN (acute kidney injury)  9/11:  Likely prerenal due to anemia  Cont to monitor  Was given 1 L fluid bolus  pRBC to be transfused should help   Renal functions   9/12/20 Improving  9/13/20 Continues to improve slowly with IV hydration     Atrial fibrillation with controlled ventricular response  9/11:  Resume home CCB and BB  Metoprolol IV PRN         VTE Risk Mitigation (From admission, onward)         Ordered     heparin (porcine) injection 5,000 Units  Once      09/13/20 1438     IP VTE HIGH RISK PATIENT  Once      09/11/20 1814     Place sequential compression device  Until discontinued      09/11/20 1814                Discharge Planning   BIJAL:      Code Status: Full Code   Is the patient medically ready for discharge?: No    Reason for patient still in hospital (select all that apply): Patient trending condition, Laboratory test, Treatment, Consult recommendations and Pending disposition                     Tal Rebolledo NP  Department of Hospital Medicine   [facility]

## 2020-09-13 NOTE — ASSESSMENT & PLAN NOTE
09/03/2020  Bone lesions, anemia, and BELEN part of CRAB criteria for myeloma  SPEP, SALVADOR, light chains sent.  Would workup with bone marrow biopsy tomorrow.  After bone marrow biopsy, can resume anticoagulation for a. Fib and recent DVT.

## 2020-09-13 NOTE — PROCEDURES
Modified Barium Swallow    Patient Name:  Jatin Kohler   MRN:  [id number]      Recommendations:     Recommendations:                General Recommendations:  Dysphagia therapy  Diet recommendations:  Mechanical soft solids, finely chopped meats, Thin(NO STRAWS)   Aspiration Precautions: Meds whole buried in puree, Monitor for s/s of aspiration, No straws, Remain upright 30 minutes post meal, Small bites/sips and Strict aspiration precautions spontaneous second swallow  General Precautions: Strict, aspiration  Communication strategies:  provide increased time to answer    Referral     Reason for Referral  Patient was referred for a Modified Barium Swallow Study to assess the efficiency of his/her swallow function, rule out aspiration and make recommendations regarding safe dietary consistencies, effective compensatory strategies, and safe eating environment.     Diagnosis: Acute blood loss anemia       History:     Past Medical History:   Diagnosis Date    *Atrial fibrillation     Anemia due to blood loss, chronic 10/15/2013    Anxiety     Bilateral carotid artery disease 6/2/2016    CHF (congestive heart failure)     Depression     Hyperlipidemia     Hypertension     Prostate hypertrophy     Sleep apnea 9/12/2013    Stroke     Venous insufficiency 9/12/2013       Objective:     Current Respiratory Status: 09/12/20    Alert: yes    Cooperative: yes    Follows Directions: yes    Visualization  · Patient was seen in the lateral view    Oral Peripheral Examination  · Oral Musculature: general weakness  · Dentition: edentulous    Consistencies Assessed  · Thin via cup and straw  · Puree smooth  · Solids regular    Oral Preparation/Oral Phase  · prolonged mastication  · Oral residue present post swallow  · Decreased base of tongue mobility  · Premature spillage    Pharyngeal Phase   Flash penetration during the swallow with thin liquids  Tongue-based, vallecula, and pyriform stasis    Cervical Esophageal  Phase  · UES appeared to accommodate all bolus types without stasis or retrograde movement observed     Results of Consistencies assessed:    Thin liquids via cup X3: Functional labial seal. Premature spillage to the vallecula. Vallecula fills up and pools over open airway before initiation of swallow. Piece-meal deglutition with larger intake.  Moderate vallecular stasis requiring cues for second swallow to clear. Flash penetration during the swallow noted on 1/3 trials. No aspiration    Thin liquids via straw: Functional labial seal. Premature spillage to the vallecula with pooling over open airway into pyriform before initiation of swallow. Flash penetration during the swallow. Moderate tongue-base and vallecular stasis pooling over open airway requiring cue for second swallow to clear. No aspiration    Puree: Functional labial seal. Premature spillage to the vallecula with pooling over open airway into pyriform before initiation of swallow. No penetration or aspiration    Solid: Functional labial seal. Extended oral prep with poor bolus control in piece meal deglutition. Premature spillage to the pyriform with vallecula filling up and pooling over open airway before initiation of swallow. Tongue-based, vallecula, and pyriform stasis pooling over open airway requiring cue for swallow to clear. No penetration or aspiration    Assessment:     Impressions  ·   Patient demonstrates moderate  Oropharyngeal  dysphagia characterized by extended oral prep, poor a-p transit, reduced tongue-based retraction, delayed pharyngeal swallow, and reduced hyolaryngeal elevation. Patient is at an increase of aspiration due to deficits listed above and dx of dementia.    Prognosis: Fair    Barriers:  · Cognitive status    Plan  Diet modifications, dysphagia strategies, and follow up speech therapy warranted to address remaining functional deficits    Education  Results were discussed with patient.    Goals:   Multidisciplinary  Problems     SLP Goals        Problem: SLP Goal    Goal Priority Disciplines Outcome   SLP Goal     SLP    Description: TPW participate in MBSS to assess the efficiency and safety of the swallow  TPW recall and/or follow dysphagia strategies during po intake of mod cues  TPW tolerate the least restrictive diet without any overt s/s of aspiration                   Plan:   · Patient to be seen:  Therapy Frequency: 3 x/week   · Plan of Care expires:     · Plan of Care reviewed with:  patient, daughter        Discharge recommendations:  nursing facility, skilled   Barriers to Discharge:  None    Time Tracking:   SLP Treatment Date:   09/12/20  Speech Start Time:  1152  Speech Stop Time:  1227     Speech Total Time (min):  35 min    Tasneem Vasquez CCC-SLP  09/13/2020

## 2020-09-13 NOTE — ASSESSMENT & PLAN NOTE
9/11:  Secondary to GI bleed  GI evaluated pt  Per report pt has had EGD and cscope at [facility]  GI planning for outpatient capsule endoscopy  Will transfuse 1 unit  Continue to monitor h/h q8h  Transfuse hgb < 8  Discontinue eliquis  9/12/20 No reports of bleeding overnight, H/H improved from 7.1/22.7 yesterday to 8.4/26/6 post transfusion of 2 units PRBC. CT abd/pelvis showed several osseous soft tissue lesions are identified within the T9 vertebral body, right proximal T9 rib and involving the left transverse process and pedicle of the L4 vertebral body concerning for metastatic multiple myeloma. Hem/onc consulted for recommendations. GI and Hem/onc recommended placement of an IVC filter due to the patient not being a candidate anticoagulation with history of GI bleed. Findings were discussed with the patients daughter Kenya Soriano who is his surrogate decision maker. She will discuss findings with the rest of the family and make a decision on how they wish to proceed.   9/13/20  No reports of bleeding. Hgb remained stable after transfusion. Hem/onc consulted and recommended a bone marrow biopsy for further w/u for possible myeloma. Does not recommend IVC filter, recommends restarting anticoagulation after biopsy. Will make patient NPO after midnight for procedure tomorrow.

## 2020-09-14 PROBLEM — G89.3 NEOPLASM RELATED PAIN: Status: ACTIVE | Noted: 2020-09-14

## 2020-09-14 PROBLEM — Z51.5 COMFORT MEASURES ONLY STATUS: Status: ACTIVE | Noted: 2020-09-14

## 2020-09-14 PROBLEM — Z51.5 ENCOUNTER FOR PALLIATIVE CARE: Status: ACTIVE | Noted: 2020-09-14

## 2020-09-14 LAB
ALBUMIN SERPL ELPH-MCNC: 2.83 G/DL (ref 3.35–5.55)
ALPHA1 GLOB SERPL ELPH-MCNC: 0.43 G/DL (ref 0.17–0.41)
ALPHA2 GLOB SERPL ELPH-MCNC: 1.21 G/DL (ref 0.43–0.99)
ANION GAP SERPL CALC-SCNC: 4 MMOL/L (ref 8–16)
B-GLOBULIN SERPL ELPH-MCNC: 0.53 G/DL (ref 0.5–1.1)
BASOPHILS # BLD AUTO: 0.01 K/UL (ref 0–0.2)
BASOPHILS # BLD AUTO: 0.01 K/UL (ref 0–0.2)
BASOPHILS NFR BLD: 0.4 % (ref 0–1.9)
BASOPHILS NFR BLD: 0.4 % (ref 0–1.9)
BUN SERPL-MCNC: 22 MG/DL (ref 8–23)
CALCIUM SERPL-MCNC: 8.1 MG/DL (ref 8.7–10.5)
CHLORIDE SERPL-SCNC: 103 MMOL/L (ref 95–110)
CO2 SERPL-SCNC: 29 MMOL/L (ref 23–29)
CREAT SERPL-MCNC: 1.5 MG/DL (ref 0.5–1.4)
DIFFERENTIAL METHOD: ABNORMAL
DIFFERENTIAL METHOD: ABNORMAL
EOSINOPHIL # BLD AUTO: 0.1 K/UL (ref 0–0.5)
EOSINOPHIL # BLD AUTO: 0.1 K/UL (ref 0–0.5)
EOSINOPHIL NFR BLD: 2.8 % (ref 0–8)
EOSINOPHIL NFR BLD: 2.8 % (ref 0–8)
ERYTHROCYTE [DISTWIDTH] IN BLOOD BY AUTOMATED COUNT: 18.8 % (ref 11.5–14.5)
ERYTHROCYTE [DISTWIDTH] IN BLOOD BY AUTOMATED COUNT: 18.8 % (ref 11.5–14.5)
EST. GFR  (AFRICAN AMERICAN): 50 ML/MIN/1.73 M^2
EST. GFR  (NON AFRICAN AMERICAN): 43 ML/MIN/1.73 M^2
GAMMA GLOB SERPL ELPH-MCNC: 5 G/DL (ref 0.67–1.58)
GLUCOSE SERPL-MCNC: 86 MG/DL (ref 70–110)
HCT VFR BLD AUTO: 28.3 % (ref 40–54)
HCT VFR BLD AUTO: 28.3 % (ref 40–54)
HGB BLD-MCNC: 8.9 G/DL (ref 14–18)
HGB BLD-MCNC: 8.9 G/DL (ref 14–18)
IMM GRANULOCYTES # BLD AUTO: 0.02 K/UL (ref 0–0.04)
IMM GRANULOCYTES # BLD AUTO: 0.02 K/UL (ref 0–0.04)
IMM GRANULOCYTES NFR BLD AUTO: 0.8 % (ref 0–0.5)
IMM GRANULOCYTES NFR BLD AUTO: 0.8 % (ref 0–0.5)
INTERPRETATION SERPL IFE-IMP: NORMAL
KAPPA LC SER QL IA: 201.1 MG/DL (ref 0.33–1.94)
KAPPA LC/LAMBDA SER IA: 164.8 (ref 0.26–1.65)
LAMBDA LC SER QL IA: 1.22 MG/DL (ref 0.57–2.63)
LYMPHOCYTES # BLD AUTO: 1.1 K/UL (ref 1–4.8)
LYMPHOCYTES # BLD AUTO: 1.1 K/UL (ref 1–4.8)
LYMPHOCYTES NFR BLD: 41.8 % (ref 18–48)
LYMPHOCYTES NFR BLD: 41.8 % (ref 18–48)
MCH RBC QN AUTO: 30.3 PG (ref 27–31)
MCH RBC QN AUTO: 30.3 PG (ref 27–31)
MCHC RBC AUTO-ENTMCNC: 31.4 G/DL (ref 32–36)
MCHC RBC AUTO-ENTMCNC: 31.4 G/DL (ref 32–36)
MCV RBC AUTO: 96 FL (ref 82–98)
MCV RBC AUTO: 96 FL (ref 82–98)
MONOCYTES # BLD AUTO: 0.1 K/UL (ref 0.3–1)
MONOCYTES # BLD AUTO: 0.1 K/UL (ref 0.3–1)
MONOCYTES NFR BLD: 5.6 % (ref 4–15)
MONOCYTES NFR BLD: 5.6 % (ref 4–15)
NEUTROPHILS # BLD AUTO: 1.2 K/UL (ref 1.8–7.7)
NEUTROPHILS # BLD AUTO: 1.2 K/UL (ref 1.8–7.7)
NEUTROPHILS NFR BLD: 48.6 % (ref 38–73)
NEUTROPHILS NFR BLD: 48.6 % (ref 38–73)
NRBC BLD-RTO: 0 /100 WBC
NRBC BLD-RTO: 0 /100 WBC
PLATELET # BLD AUTO: 124 K/UL (ref 150–350)
PLATELET # BLD AUTO: 124 K/UL (ref 150–350)
PMV BLD AUTO: 9.2 FL (ref 9.2–12.9)
PMV BLD AUTO: 9.2 FL (ref 9.2–12.9)
POTASSIUM SERPL-SCNC: 3.1 MMOL/L (ref 3.5–5.1)
PROT SERPL-MCNC: 10 G/DL (ref 6–8.4)
RBC # BLD AUTO: 2.94 M/UL (ref 4.6–6.2)
RBC # BLD AUTO: 2.94 M/UL (ref 4.6–6.2)
SODIUM SERPL-SCNC: 136 MMOL/L (ref 136–145)
WBC # BLD AUTO: 2.51 K/UL (ref 3.9–12.7)
WBC # BLD AUTO: 2.51 K/UL (ref 3.9–12.7)

## 2020-09-14 PROCEDURE — 85025 COMPLETE CBC W/AUTO DIFF WBC: CPT | Mod: HCNC

## 2020-09-14 PROCEDURE — 94761 N-INVAS EAR/PLS OXIMETRY MLT: CPT | Mod: HCNC

## 2020-09-14 PROCEDURE — 99497 ADVNCD CARE PLAN 30 MIN: CPT | Mod: HCNC,,, | Performed by: PHYSICIAN ASSISTANT

## 2020-09-14 PROCEDURE — 99215 OFFICE O/P EST HI 40 MIN: CPT | Mod: HCNC,,, | Performed by: INTERNAL MEDICINE

## 2020-09-14 PROCEDURE — 99497 PR ADVNCD CARE PLAN 30 MIN: ICD-10-PCS | Mod: HCNC,,, | Performed by: PHYSICIAN ASSISTANT

## 2020-09-14 PROCEDURE — 25000003 PHARM REV CODE 250: Mod: HCNC | Performed by: FAMILY MEDICINE

## 2020-09-14 PROCEDURE — 99205 PR OFFICE/OUTPT VISIT, NEW, LEVL V, 60-74 MIN: ICD-10-PCS | Mod: HCNC,,, | Performed by: PHYSICIAN ASSISTANT

## 2020-09-14 PROCEDURE — 92526 ORAL FUNCTION THERAPY: CPT | Mod: HCNC

## 2020-09-14 PROCEDURE — 99900035 HC TECH TIME PER 15 MIN (STAT): Mod: HCNC

## 2020-09-14 PROCEDURE — 99498 ADVNCD CARE PLAN ADDL 30 MIN: CPT | Mod: HCNC,,, | Performed by: PHYSICIAN ASSISTANT

## 2020-09-14 PROCEDURE — 25000003 PHARM REV CODE 250: Mod: HCNC | Performed by: NURSE PRACTITIONER

## 2020-09-14 PROCEDURE — 36415 COLL VENOUS BLD VENIPUNCTURE: CPT | Mod: HCNC

## 2020-09-14 PROCEDURE — G0378 HOSPITAL OBSERVATION PER HR: HCPCS | Mod: HCNC

## 2020-09-14 PROCEDURE — 99215 PR OFFICE/OUTPT VISIT, EST, LEVL V, 40-54 MIN: ICD-10-PCS | Mod: HCNC,,, | Performed by: INTERNAL MEDICINE

## 2020-09-14 PROCEDURE — 99498 PR ADVNCD CARE PLAN ADDL 30 MIN: ICD-10-PCS | Mod: HCNC,,, | Performed by: PHYSICIAN ASSISTANT

## 2020-09-14 PROCEDURE — 99205 OFFICE O/P NEW HI 60 MIN: CPT | Mod: HCNC,,, | Performed by: PHYSICIAN ASSISTANT

## 2020-09-14 PROCEDURE — 80048 BASIC METABOLIC PNL TOTAL CA: CPT | Mod: HCNC

## 2020-09-14 PROCEDURE — 27000221 HC OXYGEN, UP TO 24 HOURS: Mod: HCNC

## 2020-09-14 PROCEDURE — 94760 N-INVAS EAR/PLS OXIMETRY 1: CPT | Mod: HCNC

## 2020-09-14 RX ORDER — POTASSIUM CHLORIDE 20 MEQ/1
40 TABLET, EXTENDED RELEASE ORAL 2 TIMES DAILY
Status: COMPLETED | OUTPATIENT
Start: 2020-09-14 | End: 2020-09-14

## 2020-09-14 RX ORDER — MORPHINE SULFATE 4 MG/ML
3 INJECTION, SOLUTION INTRAMUSCULAR; INTRAVENOUS EVERY 4 HOURS PRN
Status: DISCONTINUED | OUTPATIENT
Start: 2020-09-14 | End: 2020-09-15 | Stop reason: HOSPADM

## 2020-09-14 RX ORDER — HALOPERIDOL 5 MG/ML
2 INJECTION INTRAMUSCULAR EVERY 4 HOURS PRN
Status: DISCONTINUED | OUTPATIENT
Start: 2020-09-14 | End: 2020-09-15 | Stop reason: HOSPADM

## 2020-09-14 RX ADMIN — DILTIAZEM HYDROCHLORIDE 120 MG: 120 CAPSULE, COATED, EXTENDED RELEASE ORAL at 09:09

## 2020-09-14 RX ADMIN — DONEPEZIL HYDROCHLORIDE 5 MG: 5 TABLET, FILM COATED ORAL at 09:09

## 2020-09-14 RX ADMIN — FERROUS SULFATE TAB EC 325 MG (65 MG FE EQUIVALENT) 325 MG: 325 (65 FE) TABLET DELAYED RESPONSE at 09:09

## 2020-09-14 RX ADMIN — POTASSIUM CHLORIDE 40 MEQ: 1500 TABLET, EXTENDED RELEASE ORAL at 09:09

## 2020-09-14 RX ADMIN — OXYBUTYNIN CHLORIDE 10 MG: 5 TABLET, EXTENDED RELEASE ORAL at 09:09

## 2020-09-14 RX ADMIN — METOPROLOL TARTRATE 25 MG: 25 TABLET, FILM COATED ORAL at 09:09

## 2020-09-14 RX ADMIN — PANTOPRAZOLE SODIUM 40 MG: 40 TABLET, DELAYED RELEASE ORAL at 09:09

## 2020-09-14 RX ADMIN — FUROSEMIDE 40 MG: 40 TABLET ORAL at 09:09

## 2020-09-14 RX ADMIN — OLANZAPINE 2.5 MG: 2.5 TABLET, FILM COATED ORAL at 09:09

## 2020-09-14 NOTE — PT/OT/SLP PROGRESS
Speech Language Pathology Treatment    Patient Name:  Jatin Kohler   MRN:  [id number]  Admitting Diagnosis: Acute blood loss anemia    Recommendations:                 General Recommendations:  Dysphagia therapy  Diet recommendations:  Mechanical soft, Chopped meat, Liquid Diet Level: Thin   Aspiration Precautions: 1 bite/sip at a time, Alternating bites/sips, Double swallow with each bite/sip, Feed only when awake/alert, HOB to 90 degrees, No straws and Small bites/sips   General Precautions: Standard, aspiration  Communication strategies:  none    Subjective     Pt alert and talkative. He denies swallow difficulty and is confused.  Patient goals: none stated    Pain/Comfort:  Pain Rating 1: 0/10  Pain Rating Post-Intervention 1: 0/10    Objective:     Has the patient been evaluated by SLP for swallowing?   Yes  Keep patient NPO? No   Current Respiratory Status:        Pt tolerated sips of thin liquids via straw without overt s/s of aspiration. He refused to drink without the straw despite education. He tolerated bites of meat without s/s of aspiration but required extended mastication. Education was provided on dysphagia and aspiration, however there was no evidence of learning.     Assessment:     Jatin Kohler is a 80 y.o. male with an SLP diagnosis of Dysphagia.  He presents with risk of aspiration due to weak swallow. .    Goals:   Multidisciplinary Problems     SLP Goals        Problem: SLP Goal    Goal Priority Disciplines Outcome   SLP Goal     SLP    Description: TPW participate in MBSS to assess the efficiency and safety of the swallow  TPW recall and/or follow dysphagia strategies during po intake of mod cues  TPW tolerate the least restrictive diet without any overt s/s of aspiration                   Plan:     · Patient to be seen:  3 x/week   · Plan of Care expires:     · Plan of Care reviewed with:  patient   · SLP Follow-Up:  Yes       Discharge recommendations:  nursing facility, skilled    Barriers to Discharge:      Time Tracking:     SLP Treatment Date:   09/14/20  Speech Start Time:  1138  Speech Stop Time:  1153     Speech Total Time (min):  15 min    Billable Minutes: Treatment Swallowing Dysfunction 15    Shamika Marin CCC-SLP  09/14/2020

## 2020-09-14 NOTE — ASSESSMENT & PLAN NOTE
9/11:  Secondary to GI bleed  GI evaluated pt  Per report pt has had EGD and cscope at [facility]  GI planning for outpatient capsule endoscopy  Will transfuse 1 unit  Continue to monitor h/h q8h  Transfuse hgb < 8  Discontinue eliquis  9/12/20 No reports of bleeding overnight, H/H improved from 7.1/22.7 yesterday to 8.4/26/6 post transfusion of 2 units PRBC. CT abd/pelvis showed several osseous soft tissue lesions are identified within the T9 vertebral body, right proximal T9 rib and involving the left transverse process and pedicle of the L4 vertebral body concerning for metastatic multiple myeloma. Hem/onc consulted for recommendations. GI and Hem/onc recommended placement of an IVC filter due to the patient not being a candidate anticoagulation with history of GI bleed. Findings were discussed with the patients daughter Kenya Soriano who is his surrogate decision maker. She will discuss findings with the rest of the family and make a decision on how they wish to proceed.   9/13/20  No reports of bleeding. Hgb remained stable after transfusion. Hem/onc consulted and recommended a bone marrow biopsy for further w/u for possible myeloma. Does not recommend IVC filter, recommends restarting anticoagulation after biopsy. Will make patient NPO after midnight for procedure tomorrow.   9/14/20 No acute issues overnight. Bone marrow biopsy cancelled today per family wishes. Palliative care consulted today and discussed plan of care with PORAIN Dougherty). Family wishes to make the patient DNR and comfort care only. The patient will likely discharge back to [facility] [place], operated by [facility] tomorrow with hospice.

## 2020-09-14 NOTE — SUBJECTIVE & OBJECTIVE
Interval History:  Patient is confused with advancing dementia.  Patient unable to recall reason for being in the hospital, discussing bone marrow biopsy and possible cancer.  Extensive discussion with daughter in regard to patient condition, family is not inclined at this time to proceed with treatment if he has cancer.  Also discussed necessity of a bone marrow biopsy in light of this decision.  Family will likely not proceed with bone marrow biopsy for patient due to his mental status and his inability to understand the reason for the biopsy and the pain that he would be in following procedure.    Oncology Treatment Plan:   [No treatment plan]    Medications:  Continuous Infusions:  Scheduled Meds:   diltiaZEM  120 mg Oral Daily    donepeziL  5 mg Oral QHS    ferrous sulfate  325 mg Oral BID    furosemide  40 mg Oral Daily    metoprolol tartrate  25 mg Oral BID    OLANZapine  2.5 mg Oral QHS    oxybutynin  10 mg Oral Daily    pantoprazole  40 mg Oral Daily    potassium chloride  40 mEq Oral BID     PRN Meds:sodium chloride, barium sulfate, metoprolol, sodium chloride 0.9%     Review of Systems   Unable to perform ROS: Dementia     Objective:     Vital Signs (Most Recent):  Temp: 98.2 °F (36.8 °C) (09/14/20 1203)  Pulse: 94 (09/14/20 1203)  Resp: 18 (09/14/20 1203)  BP: 118/74 (09/14/20 1203)  SpO2: 98 % (09/14/20 1203) Vital Signs (24h Range):  Temp:  [97.5 °F (36.4 °C)-98.3 °F (36.8 °C)] 98.2 °F (36.8 °C)  Pulse:  [] 94  Resp:  [16-18] 18  SpO2:  [94 %-98 %] 98 %  BP: (108-133)/(61-85) 118/74     Weight: 82.1 kg (181 lb)  Body mass index is 27.12 kg/m².  Body surface area is 1.99 meters squared.      Intake/Output Summary (Last 24 hours) at 9/14/2020 1229  Last data filed at 9/14/2020 1020  Gross per 24 hour   Intake 360 ml   Output 403 ml   Net -43 ml       Physical Exam  Vitals signs and nursing note reviewed.   Constitutional:       General: He is sleeping. He is not in acute distress.      Appearance: He is overweight. He is ill-appearing. He is not diaphoretic.   HENT:      Head: Normocephalic and atraumatic.      Right Ear: External ear normal. Decreased hearing noted.      Left Ear: External ear normal. Decreased hearing noted.      Nose: Nose normal. No mucosal edema or rhinorrhea.      Mouth/Throat:      Pharynx: Uvula midline.   Eyes:      General:         Right eye: No discharge.         Left eye: No discharge.      Conjunctiva/sclera: Conjunctivae normal.      Right eye: No chemosis.     Left eye: No chemosis.     Pupils: Pupils are equal, round, and reactive to light.   Neck:      Musculoskeletal: Normal range of motion and neck supple.      Thyroid: No thyroid mass or thyromegaly.      Trachea: Trachea normal.   Cardiovascular:      Rate and Rhythm: Normal rate and regular rhythm.      Pulses:           Dorsalis pedis pulses are 1+ on the right side and 1+ on the left side.      Heart sounds: Normal heart sounds. No murmur.   Pulmonary:      Effort: Pulmonary effort is normal. No respiratory distress.      Breath sounds: Examination of the right-lower field reveals decreased breath sounds. Examination of the left-lower field reveals decreased breath sounds. Decreased breath sounds present. No wheezing.   Abdominal:      General: Bowel sounds are decreased. There is distension.      Palpations: Abdomen is soft.      Tenderness: There is no abdominal tenderness.   Musculoskeletal: Normal range of motion.      Thoracic back: He exhibits tenderness.      Lumbar back: He exhibits tenderness and bony tenderness.   Lymphadenopathy:      Cervical: No cervical adenopathy.      Upper Body:      Right upper body: No supraclavicular adenopathy.      Left upper body: No supraclavicular adenopathy.   Skin:     General: Skin is warm and dry.      Capillary Refill: Capillary refill takes less than 2 seconds.      Coloration: Skin is pale.      Findings: No rash.   Neurological:      Mental Status: Mental  status is at baseline. He is disoriented.   Psychiatric:         Mood and Affect: Mood is not anxious.         Behavior: Behavior is slowed. Behavior is cooperative.         Thought Content: Thought content normal.         Cognition and Memory: Cognition is impaired. Memory is impaired. He exhibits impaired recent memory and impaired remote memory.         Judgment: Judgment is not impulsive.      Comments: Patient is easily agitated         Significant Labs:   CBC:   Recent Labs   Lab 09/12/20  1549 09/12/20  2351 09/14/20  0613   WBC  --   --  2.51*  2.51*   HGB 8.4* 8.4* 8.9*  8.9*   HCT 27.6* 26.2* 28.3*  28.3*   PLT  --   --  124*  124*    and CMP:   Recent Labs   Lab 09/13/20  0704 09/14/20  0613    136   K 3.2* 3.1*    103   CO2 28 29   GLU 80 86   BUN 24* 22   CREATININE 1.5* 1.5*   CALCIUM 7.8* 8.1*   ANIONGAP 6* 4*   EGFRNONAA 43* 43*       Diagnostic Results:  I have reviewed all pertinent imaging results/findings within the past 24 hours.

## 2020-09-14 NOTE — PLAN OF CARE
Fall prevention precautions maintained, pt remained free of falls throughout shift, call bell and personal items within reach, no complaints of pain at this time, pt turned Q2 hours with wedge and critic acid paste placed for MAD dermatitis. 24 hour chart check completed. Will continue to monitor

## 2020-09-14 NOTE — SUBJECTIVE & OBJECTIVE
Interval History: No acute issues overnight. Bone marrow biopsy cancelled today per family wishes. Palliative care consulted today and discussed plan of care with CANDICE Dougherty). Family wishes to make the patient DNR and comfort care only. The patient will likely discharge back to [facility] tomorrow with hospice.     Review of Systems   Unable to perform ROS: Dementia     Objective:     Vital Signs (Most Recent):  Temp: 98.1 °F (36.7 °C) (09/14/20 1656)  Pulse: 104 (09/14/20 1656)  Resp: 18 (09/14/20 1656)  BP: 120/66 (09/14/20 1656)  SpO2: (!) 94 % (09/14/20 1656) Vital Signs (24h Range):  Temp:  [97.8 °F (36.6 °C)-98.3 °F (36.8 °C)] 98.1 °F (36.7 °C)  Pulse:  [] 104  Resp:  [16-18] 18  SpO2:  [94 %-98 %] 94 %  BP: (108-133)/(61-85) 120/66     Weight: 82.1 kg (181 lb)  Body mass index is 27.12 kg/m².    Intake/Output Summary (Last 24 hours) at 9/14/2020 1732  Last data filed at 9/14/2020 1200  Gross per 24 hour   Intake 560 ml   Output 401 ml   Net 159 ml      Physical Exam  Vitals signs and nursing note reviewed.   Constitutional:       Appearance: Normal appearance. He is well-developed.   HENT:      Head: Normocephalic and atraumatic.   Eyes:      Conjunctiva/sclera: Conjunctivae normal.      Pupils: Pupils are equal, round, and reactive to light.   Neck:      Musculoskeletal: Normal range of motion and neck supple.   Cardiovascular:      Rate and Rhythm: Tachycardia present. Rhythm irregularly irregular.      Pulses: Normal pulses.      Heart sounds: Normal heart sounds. No murmur.   Pulmonary:      Effort: Pulmonary effort is normal. No tachypnea, accessory muscle usage or respiratory distress.      Breath sounds: Normal breath sounds.   Abdominal:      General: Bowel sounds are normal. There is no distension.      Palpations: Abdomen is soft.      Tenderness: There is no abdominal tenderness.   Musculoskeletal: Normal range of motion.         General: No tenderness or deformity.   Skin:     General:  Skin is warm and dry.      Capillary Refill: Capillary refill takes less than 2 seconds.      Findings: No erythema or rash.   Neurological:      Mental Status: He is alert. He is disoriented.      Deep Tendon Reflexes: Reflexes are normal and symmetric.   Psychiatric:         Speech: Speech normal.         Behavior: Behavior normal.         Thought Content: Thought content normal.         Judgment: Judgment normal.         Significant Labs: All pertinent labs within the past 24 hours have been reviewed.    Significant Imaging:   Imaging Results    None

## 2020-09-14 NOTE — PLAN OF CARE
VSS. No adverse reactions. Turn pt q 2 hours. Wound to sacral region, clean and dry. Will continue to monitor. Call light in reach. 12 hour chart check.

## 2020-09-14 NOTE — CONSULTS
"Consulted to this 80 year old male patient for present on admission wounds. PMHx of CHF, HTN, PVD, Sleep apnea, Anemia due to chronic blood loss who presents due to worsened left leg swelling over the last week. Pt is W/C bound. He is awake and alert, confused, assessment performed. Patient with venous stasis changes to BLE including hemosiderin staining, dry flaky skin. Scattered, intact scabs noted to BLE. No current VSU noted. Patient then turned to right side with minimal assist. Widespread purple and red discoloration noted to bilateral lower buttocks and thighs with scattered areas of partial and full thickness tissue loss noted within. Suspicious for DTPI with surrounding chronic tissue injury. Cleansed all with bath wipes and applied thin layer of critic aide paste (black top) to cover. Patient already on waffle overlay mattress. Will follow closely.     Suspected DTPI to bilateral buttocks and thighs:  1. Cleanse with bath wipes  2. Pat dry  3. Apply critic aide paste to cover  4. Perform twice daily and with episodes of incontinence  5. Maintain offloading     Skin Care Precautions / Pressure Injury Prevention:  1. Follow "Guidelines for Prevention of Pressure Ulcers in At Risk Patients"  These guidelines can be found on the [facility] Intranet by searching "Wound Care / Ostomy Resources"  2. Document wound assessment in [facility] using guidelines in Taty's "Assessment : Wound" procedure  3. Limit the amount of linen/underpad between patient and mattress surface to ONE fitted sheet and ONE covidien underpad - NO DIAPERS  4. Obtain Bath Wipes for providing marti care - avoid the use of wash cloths to areas affected by IAD.  5. Apply Moisture Barrier Paste to perineal / perirectal areas in a thin even layer to clean dry skin BID and after each episode of pericare  6. Apply sween 24 moisturizer cream to all dry skin after daily bath and prn  7. Obtain foam wedge from [employer] management to assist with maintaining " proper position changes at least q 2hours and document actual position in EPIC q 2hours  8. Elevate heels off mattress on 2 separate pillows placed lengthwise under each leg supporting the leg from knee to ankle.  Document in EPIC flow sheet every 2 hours.  9. .Do NOT elevate HOB greater than 30 degrees unless contraindicated.  10. Remove SCD/Plexi Pulses/SIENA's every 12 hours for 30 minutes and assess skin underneath these devices for breakdown

## 2020-09-14 NOTE — ASSESSMENT & PLAN NOTE
9/11:  Will need to discontinue blood thinners  9/12/20 Discussed findings with the patients daughter and the likely need for IVC filter placement. She will discuss with family and make a decision   9/13/20 Will resume eliquis after bone marrow biopsy  9/14/20 Bone marrow biopsy cancelled per patients family request.

## 2020-09-14 NOTE — PLAN OF CARE
CM attempted bedside assessment to determine discharge needs.  Patient able to answer most questions but did tell CM that he lived at home.  CM contacted Kenya (daughter) to discuss the discharge plan.  Per Kenya, patient is a resident at [facility].  He is wheelchair bound and does require assistance with all needs.  Plan is for patient to return to [facility] at discharge.      D/C Plan: Return to [facility]  PCP: Piper Strange Pharmacy: [facility]  Discharge transportation: [facility]  My [facility]: Declined  Pharmacy Bedside Delivery:  No     09/14/20 1282   Discharge Assessment   Assessment Type Discharge Planning Assessment   Confirmed/corrected address and phone number on facesheet? Yes   Assessment information obtained from? Patient;Caregiver;Medical Record   Expected Length of Stay (days)   (1-2)   Communicated expected length of stay with patient/caregiver yes   Prior to hospitilization cognitive status: Unable to Assess   Prior to hospitalization functional status: Partially Dependent;Wheelchair Bound   Current Functional Status: Wheelchair Bound;Partially Dependent   Facility Arrived From: [facility]   Lives With facility resident   Able to Return to Prior Arrangements yes   Is patient able to care for self after discharge? No   Who are your caregiver(s) and their phone number(s)? Kenya Soriano, daughter [phone]   Patient's perception of discharge disposition nursing home   Patient currently being followed by outpatient case management? No   Patient currently receives any other outside agency services? No   Equipment Currently Used at Home wheelchair   Do you have any problems affording any of your prescribed medications? No   Is the patient taking medications as prescribed? yes   Does the patient have transportation home? Yes   Transportation Anticipated   ([facility])   Dialysis Name and Scheduled days NA   Does the patient receive services at the [facility]? No    Discharge Plan A Return to nursing home   Discharge Plan B Return to Nursing Home   DME Needed Upon Discharge  none   Patient/Family in Agreement with Plan yes

## 2020-09-14 NOTE — CONSULTS
Advance Care Planning    Consult Note  Palliative Medicine      Consult Requested By: Tal Rebolledo NP  Reason for Consult: Goals of care    SUBJECTIVE:     History of Present Illness:  Jatin Kohler is a 80 y.o. year old with a history of dementia with behavioral disturbance,  diastolic heart failure, CVA, PVD, atrial fibrillation, and DVT now off AC due to recent GIB who presented to the emergency department from [facility] due to anemia. He was evaluated by GI who does not recommend repeating endoscopy as it was completed < 2 weeks ago and recommends IVC filter for management of DVT. Imaging revealed a soft tissue lesion in the T9 vertebral body and T9 rib concerning for metastatic disease. Oncology has evaluated and additional lab work has indicated he has multiple myeloma at advanced stage. Bone marrow biopsy was offered however his family kindly refused as they did not plan to seek cancer targeted therapy. Palliative Medicine was consulted to assist with goals of care discussion. The first time I was in the room Mr. Kohler did not wake up. I went back by later when his daughter/ LEO Zambrano was at bedside for a planned family meeting. This time he was awake and asking who was starting a . He seemed agitated and confused but Kenya laughed and says this is her father. We went to another room where she updated me of his decline since June that has landed him in the NH at [facility]. She says they initially started out at the facility for SNF but he has not regained a functional status that would allow him to return home to independent living so he is now a long term resident. She spent the majority of the conversation bringing the topic back to her grievances with his care at the NH or her concerns about his future care as it related to the NH staff. I offered my apologies but unfortunately have no say in the NH but would recommend she either speak to management, change facilities, or bring him  home. She called her sister Lexie from [place] to include her in the conversation. Both were updated of the new myeloma diagnosis and they said they did not want a biopsy as they felt the cancer being exposed to air would result in further spread. They also understand at this time he is not a candidate for therapy and feel that with his mental status that treatment would only cause distress as he would not remember why he was receiving the medications or why he felt bad. They both agreed with hospice enrollment and DNR/ DNI but continue to perseverate on facility placement and the treatment they expect. I recommended they speak to the  who would be able to facilitate transfer or be able to  them on the best solution. They are in favor of starting comfort focused treatment in the hospital and will await the call from  tomorrow.       Past Medical History:   Diagnosis Date    *Atrial fibrillation     Anemia due to blood loss, chronic 10/15/2013    Anxiety     Bilateral carotid artery disease 6/2/2016    CHF (congestive heart failure)     Depression     Hyperlipidemia     Hypertension     Prostate hypertrophy     Sleep apnea 9/12/2013    Stroke     Venous insufficiency 9/12/2013     Past Surgical History:   Procedure Laterality Date    APPENDECTOMY      CATARACT EXTRACTION, BILATERAL  08/2013    CHOLECYSTECTOMY      COLON SURGERY      diverticulitis    EYE SURGERY  08/2013    cataract    HEMORRHOID SURGERY      JOINT REPLACEMENT      left hip    WOUND DEBRIDEMENT       Family History   Problem Relation Age of Onset    Heart disease Mother     Diabetes Mother     Diabetes Sister     Hypertension Brother     Diabetes Sister     Diabetes Sister      Social History     Socioeconomic History    Marital status:      Spouse name: Not on file    Number of children: Not on file    Years of education: Not on file    Highest education level: Not on file   Occupational  History    Not on file   Social Needs    Financial resource strain: Not on file    Food insecurity     Worry: Not on file     Inability: Not on file    Transportation needs     Medical: Not on file     Non-medical: Not on file   Tobacco Use    Smoking status: Former Smoker     Packs/day: 0.50     Years: 20.00     Pack years: 10.00     Types: Cigarettes     Quit date: 1998     Years since quittin.7    Smokeless tobacco: Never Used   Substance and Sexual Activity    Alcohol use: Not Currently     Comment: social use of alcohol    Drug use: No    Sexual activity: Not Currently     Birth control/protection: None   Lifestyle    Physical activity     Days per week: Not on file     Minutes per session: Not on file    Stress: Not on file   Relationships    Social connections     Talks on phone: Not on file     Gets together: Not on file     Attends [employer] service: Not on file     Active member of club or organization: Not on file     Attends meetings of clubs or organizations: Not on file     Relationship status: Not on file   Other Topics Concern    Not on file   Social History Narrative    Not on file      Review of patient's allergies indicates:   Allergen Reactions    Ativan [lorazepam]      Worsening psychosis reported when taken for agitation per daughter's report       Medications:    Current Facility-Administered Medications:     0.9%  NaCl infusion (for blood administration), , Intravenous, Q24H PRN, Sruthi Kraus MD    barium sulfate (VOLUMEN) suspension 1,350 mL, 1,350 mL, Oral, ONCE PRN, Mukund Mae MD    diltiaZEM 24 hr capsule 120 mg, 120 mg, Oral, Daily, Mukund Mae MD, 120 mg at 20 0933    donepeziL tablet 5 mg, 5 mg, Oral, QHS, Mukund Mae MD, 5 mg at 20    ferrous sulfate EC tablet 325 mg, 325 mg, Oral, BID, Mukund Mae MD, 325 mg at 20 0932    furosemide tablet 40 mg, 40 mg, Oral, Daily, Mukund Mae MD, 40 mg at 20 0933    haloperidol lactate injection 2 mg, 2  mg, Intravenous, Q4H PRN, Socorro Clayton PA-C    metoprolol injection 5 mg, 5 mg, Intravenous, Q6H PRN, Mukund Mae MD    metoprolol tartrate (LOPRESSOR) tablet 25 mg, 25 mg, Oral, BID, Mukund Mae MD, 25 mg at 09/14/20 0932    morphine injection 3 mg, 3 mg, Intravenous, Q4H PRN, Socorro Clayton PA-C    OLANZapine tablet 2.5 mg, 2.5 mg, Oral, QHS, Mukund Mae MD, 2.5 mg at 09/13/20 2113    oxybutynin 24 hr tablet 10 mg, 10 mg, Oral, Daily, Mukund Mae MD, 10 mg at 09/14/20 0933    pantoprazole EC tablet 40 mg, 40 mg, Oral, Daily, Mukund Mae MD, 40 mg at 09/14/20 0932    potassium chloride SA CR tablet 40 mEq, 40 mEq, Oral, BID, Tal Rebolledo NP, 40 mEq at 09/14/20 0933    sodium chloride 0.9% flush 10 mL, 10 mL, Intravenous, PRN, Sruthi Kraus MD    ROS:  Review of Systems   Unable to perform ROS: Dementia       OBJECTIVE:     Physical Exam:  Vitals: Temp: 98.2 °F (36.8 °C) (09/14/20 1203)  Pulse: 94 (09/14/20 1203)  Resp: 18 (09/14/20 1203)  BP: 118/74 (09/14/20 1203)  SpO2: 98 % (09/14/20 1203)    Gen: well-developed, well-nourished, globally confused, auditory hallucinations  Head: atraumatic, normocephalic  Eyes: conjuctiva and sclera clear  Ears: hearing grossly intact with no external abnormality  Mouth: no mucositis  Respiratory: CTAB  Heart: RRR  Abdomen: soft, NTTP, nondistended, normoactive BS  Pulses: 2+ in DP  Extremities: no edema, moving UE symmetrically  Neurologic: not cooperative with exam  Skin: no rashes or lesions  Psych: globally confused, auditory hallucinations    Review of Symptoms    Symptom Assessment (ESAS 0-10 Scale)     CAM / Delirium:  Positive          Performance Status:  40    ECOG Performance Status Grade:  3 - Confined to bed or chair 50% of waking hours    Living Arrangements:  Lives in nursing home    Advance Directives:   Living Will: Yes        Copy on chart: Yes    LaPOST: No    Do Not Resuscitate Status: Yes    Medical Power of : Yes    Agent's Name:  Kenya  Bo    Agent's Contact Number:   [phone]     Decision Making:  Family answered questions and Patient unable to communicate due to disease severity/cognitive impairment      Labs:  WBC   Date Value Ref Range Status   09/14/2020 2.51 (L) 3.90 - 12.70 K/uL Final   09/14/2020 2.51 (L) 3.90 - 12.70 K/uL Final     Hemoglobin   Date Value Ref Range Status   09/14/2020 8.9 (L) 14.0 - 18.0 g/dL Final   09/14/2020 8.9 (L) 14.0 - 18.0 g/dL Final     Hematocrit   Date Value Ref Range Status   09/14/2020 28.3 (L) 40.0 - 54.0 % Final   09/14/2020 28.3 (L) 40.0 - 54.0 % Final     Mean Corpuscular Volume   Date Value Ref Range Status   09/14/2020 96 82 - 98 fL Final   09/14/2020 96 82 - 98 fL Final     Platelets   Date Value Ref Range Status   09/14/2020 124 (L) 150 - 350 K/uL Final   09/14/2020 124 (L) 150 - 350 K/uL Final       BMP  Lab Results   Component Value Date     09/14/2020    K 3.1 (L) 09/14/2020     09/14/2020    CO2 29 09/14/2020    BUN 22 09/14/2020    CREATININE 1.5 (H) 09/14/2020    CALCIUM 8.1 (L) 09/14/2020    ANIONGAP 4 (L) 09/14/2020    ESTGFRAFRICA 50 (A) 09/14/2020    EGFRNONAA 43 (A) 09/14/2020       Lab Results   Component Value Date    AST 17 09/11/2020    ALKPHOS 70 09/11/2020    BILITOT 0.2 09/11/2020       Albumin   Date Value Ref Range Status   09/11/2020 1.9 (L) 3.5 - 5.2 g/dL Final       Radiology:I have reviewed all pertinent imaging results/findings within the past 24 hours.  - CT abdomen/ pelvis 9/12/20:  No evidence of bowel obstruction or inflammatory change.  Postsurgical changes of a distal colectomy.  Diverticulosis coli without evidence of acute diverticulitis.     Several osseous soft tissue lesions are identified within the T9 vertebral body, right proximal T9 rib and involving the left transverse process and pedicle of the L4 vertebral body.  Additionally, there is a pathologic appearing compression fracture of the T9 vertebral body.  Findings are concerning for  metastatic disease.     1.1 cm hyperenhancing focus within the left hepatic lobe may reflect a small flash filling hemangioma.  Triple phase CT could be utilized for further evaluation.     Bibasilar areas of atelectasis or developing consolidation.  Trace right pleural effusion.    ASSESSMENT   Jatin Kohler is a 80 y.o. year old with a history of dementia with behavioral disturbance,  diastolic heart failure, CVA, PVD, atrial fibrillation, and DVT now off AC due to recent GIB who presented to the emergency department from [facility] due to anemia. He was evaluated by GI who does not recommend repeating endoscopy as it was completed < 2 weeks ago and recommends IVC filter for management of DVT. Imaging revealed a soft tissue lesion in the T9 vertebral body and T9 rib concerning for metastatic disease. Oncology has evaluated and additional lab work has indicated he has multiple myeloma at advanced stage. Bone marrow biopsy was offered however his family kindly refused as they did not plan to seek cancer targeted therapy. Palliative Medicine was consulted to assist with goals of care discussion.     PLAN   1. Encounter for Palliative Care  - Code status: DNR/ DNI- changed today  - HCPOA: Kenya Soriano daughter  - Advance directive scanned into computer  - Details of family meeting in [facility]  - Primary outcome of meeting is to redirect to comfort focused care, not seek additional cancer staging or treatment, enroll in hospice.  - CM to assist with hospice referral and discharge placement     2. Multiple myeloma  - Oncology notes reviewed  - Family kindly refuses additional staging and treatment citing his advanced dementia  - At least in part contributing to anemia    3. BELEN  - Cr elevated above baseline  - Will monitor morphine usage and if >4 doses/ day will plan to rotate to PO oxycodone or IV dilaudid.    4. Neoplasm related pain/ agitation/ secretions  - Morphine 3mg IV q4hr PRN pain  - Haldol 2mg IV q4 PRN  agitation  - Robinul PRN secretions  - Of note family does not wish to use lorazepam as it worsens his mental status however  review reveals he fills Ativan 1mg tabs #30 monthly since 06/2020.    5. Dementia with behavioral disturbance  - FAST 6D  - He recently saw neurology at [facility] per Care Everywhere review and has been evaluated by psychiatry with brief PEC hold.   - Continue Zyprexa 2.5mg HS and might recommend increasing       Discussed case and visit details with Ines Bynum NP, Tal Rebolledo NP, and Dr. Mae.    Thank you for allowing Palliative Medicine to be involved in the care of Jatin Kohler.         Medical decision making: HIGH based on high risk of death, untreated symptoms, high risk medications, poor prognosis, deescalating treatments, management of more than one chronic illness in exacerbation, managing side effects of medications or polypharmacy.      Plan required increased review of medication choice, interaction, dosing, frequency, and route due to patient complexity. Patient complexity increased by: At risk for delirium, Presence of altered mentation, Presence of renal insufficiency, Presence of advanced age    50 min ACP time spent discussing code status, goals of care, symptom assessment, coordination of care and emotional support, formulating and communicating prognosis and goals of care, exploring burden/ benefit of various approaches of treatment. [phone]    Socorro Clayton PA-C  Palliative Medicine

## 2020-09-14 NOTE — ASSESSMENT & PLAN NOTE
Bone lesions, anemia, and BELEN part of CRAB criteria for myeloma  SPEP, SALVADOR, light chains sent.  Would workup with bone marrow biopsy tomorrow.  After bone marrow biopsy, can resume anticoagulation for a. Fib and recent DVT.    --free light chain results received, from review of results this is likely multiple myeloma at an advanced stage.  Treatment would include some form of chemotherapy which patient would not tolerate in his current state.  After extensive conversation with patient's daughter today at the patient's bedside, family is not wanting to pursue with any form of treatment due to his current condition.  Also discussed performing a bone marrow biopsy, daughter would rather not put her father through any procedures at this point.  --consulted palliative Medicine for advanced care planning and possible hospice.  Hospice would be in patient's best interest at this time.

## 2020-09-14 NOTE — PROGRESS NOTES
[facility] Hospital Medicine  Progress Note    Patient Name: Jatin Kohler  MRN: [id number]  Patient Class: OP- Observation   Admission Date: 9/11/2020  Length of Stay: 0 days  Attending Physician: Mukund Mae MD  Primary Care Provider: Addi Saldaña MD        Subjective:     Principal Problem:Acute blood loss anemia        HPI:  Jatin Kohler is a 80 y.o. male patient who presents to the Emergency Department for evaluation of low H&H. Patient found sitting alone in the lobby and is unable to explain his reason for visiting the ED. Paperwork regarding patient reports he is a resident of [facility] sent for further evaluation of H&H of 6.4/19.7. Information provided by Daughter. ED workup showed: CBC showed H/H 6.6/21.9, platelets of 142. CMP showed K+ of 3.2, BUN of 31, Creatinine of 1.8, CT abdomen pending. Patient placed in observation for acute blood loss anemia and BELEN.     Overview/Hospital Course:  9/12/20 No reports of bleeding overnight, H/H improved from 7.1/22.7 yesterday to 8.4/26/6 post transfusion of 2 units PRBC. CT abd/pelvis showed several osseous soft tissue lesions are identified within the T9 vertebral body, right proximal T9 rib and involving the left transverse process and pedicle of the L4 vertebral body concerning for metastatic multiple myeloma. Hem/onc consulted for recommendations. GI and Hem/onc recommended placement of an IVC filter due to the patient not being a candidate anticoagulation with history of GI bleed. Findings were discussed with the patients daughter Kenya Soriano who is his surrogate decision maker. She will discuss findings with the rest of the family. 9/13/20 No acute issues overnight. No reports of bleeding. Hgb remained stable after transfusion. Hem/onc consulted and recommended a bone marrow biopsy for further w/u for possible myeloma. Does not recommend IVC filter, recommends restarting anticoagulation after biopsy. Will make patient NPO after  midnight for procedure tomorrow. 9/14/20 No acute issues overnight. Bone marrow biopsy cancelled today per family wishes. Palliative care consulted today and discussed plan of care with CANDICE Dougherty). Family wishes to make the patient DNR and comfort care only. The patient will likely discharge back to [facility] tomorrow with hospice.     Interval History: No acute issues overnight. Bone marrow biopsy cancelled today per family wishes. Palliative care consulted today and discussed plan of care with CANDICE Dougherty). Family wishes to make the patient DNR and comfort care only. The patient will likely discharge back to [facility] tomorrow with hospice.     Review of Systems   Unable to perform ROS: Dementia     Objective:     Vital Signs (Most Recent):  Temp: 98.1 °F (36.7 °C) (09/14/20 1656)  Pulse: 104 (09/14/20 1656)  Resp: 18 (09/14/20 1656)  BP: 120/66 (09/14/20 1656)  SpO2: (!) 94 % (09/14/20 1656) Vital Signs (24h Range):  Temp:  [97.8 °F (36.6 °C)-98.3 °F (36.8 °C)] 98.1 °F (36.7 °C)  Pulse:  [] 104  Resp:  [16-18] 18  SpO2:  [94 %-98 %] 94 %  BP: (108-133)/(61-85) 120/66     Weight: 82.1 kg (181 lb)  Body mass index is 27.12 kg/m².    Intake/Output Summary (Last 24 hours) at 9/14/2020 1732  Last data filed at 9/14/2020 1200  Gross per 24 hour   Intake 560 ml   Output 401 ml   Net 159 ml      Physical Exam  Vitals signs and nursing note reviewed.   Constitutional:       Appearance: Normal appearance. He is well-developed.   HENT:      Head: Normocephalic and atraumatic.   Eyes:      Conjunctiva/sclera: Conjunctivae normal.      Pupils: Pupils are equal, round, and reactive to light.   Neck:      Musculoskeletal: Normal range of motion and neck supple.   Cardiovascular:      Rate and Rhythm: Tachycardia present. Rhythm irregularly irregular.      Pulses: Normal pulses.      Heart sounds: Normal heart sounds. No murmur.   Pulmonary:      Effort: Pulmonary effort is normal. No tachypnea, accessory muscle  usage or respiratory distress.      Breath sounds: Normal breath sounds.   Abdominal:      General: Bowel sounds are normal. There is no distension.      Palpations: Abdomen is soft.      Tenderness: There is no abdominal tenderness.   Musculoskeletal: Normal range of motion.         General: No tenderness or deformity.   Skin:     General: Skin is warm and dry.      Capillary Refill: Capillary refill takes less than 2 seconds.      Findings: No erythema or rash.   Neurological:      Mental Status: He is alert. He is disoriented.      Deep Tendon Reflexes: Reflexes are normal and symmetric.   Psychiatric:         Speech: Speech normal.         Behavior: Behavior normal.         Thought Content: Thought content normal.         Judgment: Judgment normal.         Significant Labs: All pertinent labs within the past 24 hours have been reviewed.    Significant Imaging:   Imaging Results    None             Assessment/Plan:      * Acute blood loss anemia  9/11:  Secondary to GI bleed  GI evaluated pt  Per report pt has had EGD and cscope at [facility]  GI planning for outpatient capsule endoscopy  Will transfuse 1 unit  Continue to monitor h/h q8h  Transfuse hgb < 8  Discontinue eliquis  9/12/20 No reports of bleeding overnight, H/H improved from 7.1/22.7 yesterday to 8.4/26/6 post transfusion of 2 units PRBC. CT abd/pelvis showed several osseous soft tissue lesions are identified within the T9 vertebral body, right proximal T9 rib and involving the left transverse process and pedicle of the L4 vertebral body concerning for metastatic multiple myeloma. Hem/onc consulted for recommendations. GI and Hem/onc recommended placement of an IVC filter due to the patient not being a candidate anticoagulation with history of GI bleed. Findings were discussed with the patients daughter Kenya Soriano who is his surrogate decision maker. She will discuss findings with the rest of the family and make a decision on how they  wish to proceed.   9/13/20  No reports of bleeding. Hgb remained stable after transfusion. Hem/onc consulted and recommended a bone marrow biopsy for further w/u for possible myeloma. Does not recommend IVC filter, recommends restarting anticoagulation after biopsy. Will make patient NPO after midnight for procedure tomorrow.   9/14/20 No acute issues overnight. Bone marrow biopsy cancelled today per family wishes. Palliative care consulted today and discussed plan of care with POA Farhat). Family wishes to make the patient DNR and comfort care only. The patient will likely discharge back to [facility] tomorrow with hospice.       Neoplasm related pain  Analgesia PRN      Comfort measures only status        Compression fracture of T9 vertebra  - CT abd/pelvis showed several osseous soft tissue lesions are identified within the T9 vertebral body, right proximal T9 rib and involving the left transverse process and pedicle of the L4 vertebral body concerning for metastatic multiple myeloma. Hem/onc consulted for recommendations. Analgesia PRN    Dementia  9/11:  Patient started on donepezil outpatient  Daughter states that pt should   Not receive any ativan for agitation due   Worsening psychosis      Chronic anticoagulation  9/11:  Will need to discontinue blood thinners  9/12/20 Discussed findings with the patients daughter and the likely need for IVC filter placement. She will discuss with family and make a decision   9/13/20 Will resume eliquis after bone marrow biopsy  9/14/20 Bone marrow biopsy cancelled per patients family request.    BELEN (acute kidney injury)  9/11:  Likely prerenal due to anemia  Cont to monitor  Was given 1 L fluid bolus  pRBC to be transfused should help   Renal functions   9/12/20 Improving  9/13/20 Continues to improve slowly with IV hydration     Atrial fibrillation with controlled ventricular response  9/11:  Resume home CCB and BB  Metoprolol IV PRN         VTE Risk Mitigation (From  admission, onward)         Ordered     IP VTE HIGH RISK PATIENT  Once      09/11/20 1814     Place sequential compression device  Until discontinued      09/11/20 1814                Discharge Planning   BIJAL:      Code Status: DNR   Is the patient medically ready for discharge?:     Reason for patient still in hospital (select all that apply): Patient trending condition, Laboratory test, Treatment and Consult recommendations  Discharge Plan A: Return to nursing home                  Tal Rebolledo NP  Department of Hospital Medicine   [facility]

## 2020-09-14 NOTE — CONSULTS
Chart reviewed and patient appears to be a candidate for a bone marrow biopsy.  Dr. Márquez has placed the order and the procedure will be performed asap.  Thank you for the consult.

## 2020-09-14 NOTE — PROGRESS NOTES
[facility]   Hematology/Oncology  Progress Note    Patient Name: Jatin Kohler  Admission Date: 9/11/2020  Hospital Length of Stay: 0 days  Code Status: Full Code     Subjective:     HPI:  Mr. Jatin Kohler is a 81 yo gentleman with multiple medical problems including CHF, HTN, dyslipidemia, CVA, PVD and Afib whom was diagnosed with a  DVT of LLE in June 2020 at [facility]. Patient was discharged on anticoagulation to [facility] for 24 hour care. He did well until last month when he was hospitalized for profound anemia at [facility] from 8/19/20 to 8/24/20. During this hospitalization he was transfused and GI was consulted. He under both a EGD and colonoscopy that was notable for a normal upper endoscopy and colonoscopy identified severe diverticulosis and evidence of sigmoid resection likely also due to severe diverticulosis.     Patient found sitting alone in the lobby and is unable to explain his reason for visiting the ED. Paperwork regarding patient reports he is a resident of [facility] sent for further evaluation of H&H of 6.4/19.7. Information provided by Daughter. ED workup showed: CBC showed H/H 6.6/21.9, platelets of 142.  Was transfused 2 units RBC.    CT showed bone lesions concerning for myeloma.    Interval History:  Patient is confused with advancing dementia.  Patient unable to recall reason for being in the hospital, discussing bone marrow biopsy and possible cancer.  Extensive discussion with daughter in regard to patient condition, family is not inclined at this time to proceed with treatment if he has cancer.  Also discussed necessity of a bone marrow biopsy in light of this decision.  Family will likely not proceed with bone marrow biopsy for patient due to his mental status and his inability to understand the reason for the biopsy and the pain that he would be in following procedure.    Oncology Treatment Plan:   [No treatment  plan]    Medications:  Continuous Infusions:  Scheduled Meds:   diltiaZEM  120 mg Oral Daily    donepeziL  5 mg Oral QHS    ferrous sulfate  325 mg Oral BID    furosemide  40 mg Oral Daily    metoprolol tartrate  25 mg Oral BID    OLANZapine  2.5 mg Oral QHS    oxybutynin  10 mg Oral Daily    pantoprazole  40 mg Oral Daily    potassium chloride  40 mEq Oral BID     PRN Meds:sodium chloride, barium sulfate, metoprolol, sodium chloride 0.9%     Review of Systems   Unable to perform ROS: Dementia     Objective:     Vital Signs (Most Recent):  Temp: 98.2 °F (36.8 °C) (09/14/20 1203)  Pulse: 94 (09/14/20 1203)  Resp: 18 (09/14/20 1203)  BP: 118/74 (09/14/20 1203)  SpO2: 98 % (09/14/20 1203) Vital Signs (24h Range):  Temp:  [97.5 °F (36.4 °C)-98.3 °F (36.8 °C)] 98.2 °F (36.8 °C)  Pulse:  [] 94  Resp:  [16-18] 18  SpO2:  [94 %-98 %] 98 %  BP: (108-133)/(61-85) 118/74     Weight: 82.1 kg (181 lb)  Body mass index is 27.12 kg/m².  Body surface area is 1.99 meters squared.      Intake/Output Summary (Last 24 hours) at 9/14/2020 1229  Last data filed at 9/14/2020 1020  Gross per 24 hour   Intake 360 ml   Output 403 ml   Net -43 ml       Physical Exam  Vitals signs and nursing note reviewed.   Constitutional:       General: He is sleeping. He is not in acute distress.     Appearance: He is overweight. He is ill-appearing. He is not diaphoretic.   HENT:      Head: Normocephalic and atraumatic.      Right Ear: External ear normal. Decreased hearing noted.      Left Ear: External ear normal. Decreased hearing noted.      Nose: Nose normal. No mucosal edema or rhinorrhea.      Mouth/Throat:      Pharynx: Uvula midline.   Eyes:      General:         Right eye: No discharge.         Left eye: No discharge.      Conjunctiva/sclera: Conjunctivae normal.      Right eye: No chemosis.     Left eye: No chemosis.     Pupils: Pupils are equal, round, and reactive to light.   Neck:      Musculoskeletal: Normal range of motion  and neck supple.      Thyroid: No thyroid mass or thyromegaly.      Trachea: Trachea normal.   Cardiovascular:      Rate and Rhythm: Normal rate and regular rhythm.      Pulses:           Dorsalis pedis pulses are 1+ on the right side and 1+ on the left side.      Heart sounds: Normal heart sounds. No murmur.   Pulmonary:      Effort: Pulmonary effort is normal. No respiratory distress.      Breath sounds: Examination of the right-lower field reveals decreased breath sounds. Examination of the left-lower field reveals decreased breath sounds. Decreased breath sounds present. No wheezing.   Abdominal:      General: Bowel sounds are decreased. There is distension.      Palpations: Abdomen is soft.      Tenderness: There is no abdominal tenderness.   Musculoskeletal: Normal range of motion.      Thoracic back: He exhibits tenderness.      Lumbar back: He exhibits tenderness and bony tenderness.   Lymphadenopathy:      Cervical: No cervical adenopathy.      Upper Body:      Right upper body: No supraclavicular adenopathy.      Left upper body: No supraclavicular adenopathy.   Skin:     General: Skin is warm and dry.      Capillary Refill: Capillary refill takes less than 2 seconds.      Coloration: Skin is pale.      Findings: No rash.   Neurological:      Mental Status: Mental status is at baseline. He is disoriented.   Psychiatric:         Mood and Affect: Mood is not anxious.         Behavior: Behavior is slowed. Behavior is cooperative.         Thought Content: Thought content normal.         Cognition and Memory: Cognition is impaired. Memory is impaired. He exhibits impaired recent memory and impaired remote memory.         Judgment: Judgment is not impulsive.      Comments: Patient is easily agitated         Significant Labs:   CBC:   Recent Labs   Lab 09/12/20  1549 09/12/20  2351 09/14/20  0613   WBC  --   --  2.51*  2.51*   HGB 8.4* 8.4* 8.9*  8.9*   HCT 27.6* 26.2* 28.3*  28.3*   PLT  --   --  124*  124*     and CMP:   Recent Labs   Lab 09/13/20  0704 09/14/20  0613    136   K 3.2* 3.1*    103   CO2 28 29   GLU 80 86   BUN 24* 22   CREATININE 1.5* 1.5*   CALCIUM 7.8* 8.1*   ANIONGAP 6* 4*   EGFRNONAA 43* 43*       Diagnostic Results:  I have reviewed all pertinent imaging results/findings within the past 24 hours.    Assessment/Plan:     Compression fracture of T9 vertebra  Bone lesions, anemia, and BELEN part of CRAB criteria for myeloma  SPEP, SALVADOR, light chains sent.  Would workup with bone marrow biopsy tomorrow.  After bone marrow biopsy, can resume anticoagulation for a. Fib and recent DVT.    --free light chain results received, from review of results this is likely multiple myeloma at an advanced stage.  Treatment would include some form of chemotherapy which patient would not tolerate in his current state.  After extensive conversation with patient's daughter today at the patient's bedside, family is not wanting to pursue with any form of treatment due to his current condition.  Also discussed performing a bone marrow biopsy, daughter would rather not put her father through any procedures at this point.  --consulted palliative Medicine for advanced care planning and possible hospice.  Hospice would be in patient's best interest at this time.        Thank you for your consult. I will follow-up with patient. Please contact us if you have any additional questions.     Ines Bynum NP  Hematology/Oncology  [facility]

## 2020-09-15 VITALS
OXYGEN SATURATION: 96 % | TEMPERATURE: 99 F | RESPIRATION RATE: 18 BRPM | SYSTOLIC BLOOD PRESSURE: 102 MMHG | HEART RATE: 87 BPM | DIASTOLIC BLOOD PRESSURE: 69 MMHG | WEIGHT: 181 LBS | HEIGHT: 69 IN | BODY MASS INDEX: 26.81 KG/M2

## 2020-09-15 LAB
PATHOLOGIST INTERPRETATION IFE: NORMAL
PATHOLOGIST INTERPRETATION SPE: NORMAL

## 2020-09-15 PROCEDURE — 99215 OFFICE O/P EST HI 40 MIN: CPT | Mod: HCNC,,, | Performed by: INTERNAL MEDICINE

## 2020-09-15 PROCEDURE — 27000221 HC OXYGEN, UP TO 24 HOURS: Mod: HCNC

## 2020-09-15 PROCEDURE — 25000003 PHARM REV CODE 250: Mod: HCNC | Performed by: FAMILY MEDICINE

## 2020-09-15 PROCEDURE — 94761 N-INVAS EAR/PLS OXIMETRY MLT: CPT | Mod: HCNC

## 2020-09-15 PROCEDURE — 99900038 HC OT GENERIC THERAPY SCREENING (STAT): Mod: HCNC | Performed by: PHYSICAL THERAPIST

## 2020-09-15 PROCEDURE — G0378 HOSPITAL OBSERVATION PER HR: HCPCS | Mod: HCNC,CS

## 2020-09-15 PROCEDURE — 99900035 HC TECH TIME PER 15 MIN (STAT): Mod: HCNC

## 2020-09-15 PROCEDURE — 99215 PR OFFICE/OUTPT VISIT, EST, LEVL V, 40-54 MIN: ICD-10-PCS | Mod: HCNC,,, | Performed by: INTERNAL MEDICINE

## 2020-09-15 RX ADMIN — OXYBUTYNIN CHLORIDE 10 MG: 5 TABLET, EXTENDED RELEASE ORAL at 08:09

## 2020-09-15 RX ADMIN — FERROUS SULFATE TAB EC 325 MG (65 MG FE EQUIVALENT) 325 MG: 325 (65 FE) TABLET DELAYED RESPONSE at 08:09

## 2020-09-15 RX ADMIN — METOPROLOL TARTRATE 25 MG: 25 TABLET, FILM COATED ORAL at 08:09

## 2020-09-15 RX ADMIN — PANTOPRAZOLE SODIUM 40 MG: 40 TABLET, DELAYED RELEASE ORAL at 08:09

## 2020-09-15 RX ADMIN — FUROSEMIDE 40 MG: 40 TABLET ORAL at 08:09

## 2020-09-15 RX ADMIN — DILTIAZEM HYDROCHLORIDE 120 MG: 120 CAPSULE, COATED, EXTENDED RELEASE ORAL at 08:09

## 2020-09-15 NOTE — PLAN OF CARE
Discharge orders received and sent to [facility] and [facility] via [facility].    Lindsey, primary nurse provided with number for report [phone]  [facility] will arrange transportation.

## 2020-09-15 NOTE — PT/OT/SLP PROGRESS
Physical Therapy      Patient Name:  Jatin Kohler   MRN:  [id number]    Patient not seen today secondary to (P.TSuzy MARTINEZ INITIATED THIS AM VIA CHART REVIEW, THERAPIST SPOKE WITH NP PETE ROLLE WHO REPORTS PT WILL BE D/C TO HOSPICE CARE AND OK TO D/C PT FROM P.T. SERVICES).     Pati Craig, PT   9/15/2020  0971

## 2020-09-15 NOTE — PT/OT/SLP PROGRESS
Speech Language Pathology      Jatin Kolher  MRN: [id number]    Patient not seen today secondary to unable to arouse  . Will follow-up next visit.    Shamika Marin CCC-SLP

## 2020-09-15 NOTE — DISCHARGE SUMMARY
[facility] Hospital Medicine  Discharge Summary      Patient Name: Jatin Kohler  MRN: [id number]  Admission Date: 9/11/2020  Hospital Length of Stay: 0 days  Discharge Date and Time:  09/15/2020 5:16 PM  Attending Physician: No att. providers found   Discharging Provider: Tal Rebolledo NP  Primary Care Provider: Addi Saldaña MD      HPI:   Jatin Kohler is a 80 y.o. male patient who presents to the Emergency Department for evaluation of low H&H. Patient found sitting alone in the lobby and is unable to explain his reason for visiting the ED. [facility] H&[facility] of 6.4/19.7. Information provided by Daughter. ED workup showed: CBC showed H/H 6.6/21.9, platelets of 142. CMP showed K+ of 3.2, BUN of 31, Creatinine of 1.8, CT abdomen pending. Patient placed in observation for acute blood loss anemia and BELEN.     * No surgery found *      Hospital Course:   9/12/20 No reports of bleeding overnight, H/H improved from 7.1/22.7 yesterday to 8.4/26/6 post transfusion of 2 units PRBC. CT abd/pelvis showed several osseous soft tissue lesions are identified within the T9 vertebral body, right proximal T9 rib and involving the left transverse process and pedicle of the L4 vertebral body concerning for metastatic multiple myeloma. Hem/onc consulted for recommendations. GI and Hem/onc recommended placement of an IVC filter due to the patient not being a candidate anticoagulation with history of GI bleed. Findings were discussed with the patients daughter Kenya Soriano who is his surrogate decision maker. She will discuss findings with the rest of the family. 9/13/20 No acute issues overnight. No reports of bleeding. Hgb remained stable after transfusion. Hem/onc consulted and recommended a bone marrow biopsy for further w/u for possible myeloma. Does not recommend IVC filter, recommends restarting anticoagulation after  biopsy. Will make patient NPO after midnight for procedure tomorrow. 9/14/20 No acute issues overnight. Bone marrow biopsy cancelled today per family wishes. Palliative care consulted today and discussed plan of care with POA (Kenya). Family wishes to make the patient DNR and comfort care only. The patient will likely discharge back to [facility] tomorrow with hospice. 9/15/20 No acute issues overnight. The patient and family agreed to [facility]. The patient was seen and examined today and deemed stable for discharge. The patient is being discharged back to [facility] under the care of [facility].      Consults:   Consults (From admission, onward)        Status Ordering Provider     Inpatient consult to Gastroenterology  Once     Provider:  Clarissa Daly MD    Completed MAICO NEAL     Inpatient consult to Hematology/Oncology  Once     Provider:  Amrik Márquez MD    Acknowledged PETE ROLLE     Inpatient consult to Interventional Radiology  Once     Provider:  (Not yet assigned)    Completed PETE ROLLE     Inpatient consult to Palliative Care  Once     Provider:  (Not yet assigned)    Completed PETE ROLLE     Inpatient consult to Social Work  Once     Provider:  (Not yet assigned)    Completed JANELLE YOUSSEF     IP consult to case management  Once     Provider:  (Not yet assigned)    Completed BRITTNY ARREAGA          No new Assessment & Plan notes have been filed under this hospital service since the last note was generated.  Service: Hospital Medicine    Final Active Diagnoses:    Diagnosis Date Noted POA    PRINCIPAL PROBLEM:  Acute blood loss anemia [D62] 09/11/2020 Yes    Comfort measures only status [Z51.5] 09/14/2020 Not Applicable    Encounter for palliative care [Z51.5] 09/14/2020 Not Applicable    Neoplasm related pain [G89.3] 09/14/2020 Yes    Compression fracture of T9 vertebra [S22.070A] 09/12/2020 Yes    BELEN (acute kidney injury) [N17.9]  09/11/2020 Yes    Chronic anticoagulation [Z79.01] 09/11/2020 Not Applicable    Dementia [F03.90] 09/11/2020 Yes    Atrial fibrillation with controlled ventricular response [I48.91] 02/03/2015 Yes     Chronic      Problems Resolved During this Admission:       Discharged Condition: stable    Disposition: Hospice/Home    Follow Up:  Follow-up Information     [facility].    Why: Nursing Home  Contact information:  [address] [facility] [address]  [phone]           Addi Saldaña MD. Schedule an appointment as soon as possible for a visit in 3 days.    Specialty: Family Medicine  Contact information:  [address] [address] [address]  [phone]             [facility].    Why: Hospice  Contact information:  [address] [facility] [address] [phone]                 Patient Instructions:   No discharge procedures on file.    Significant Diagnostic Studies:   Imaging Results    None           Pending Diagnostic Studies:     None         Medications:  Reconciled Home Medications:      Medication List      CHANGE how you take these medications    apixaban 2.5 mg Tab  Commonly known as: ELIQUIS  Take 1 tablet (2.5 mg total) by mouth 2 (two) times daily.  What changed: how much to take        CONTINUE taking these medications    aspirin 81 MG EC tablet  Commonly known as: ECOTRIN  Take 1 tablet (81 mg total) by mouth once daily.     diltiaZEM 120 MG Cp24  Commonly known as: CARDIZEM CD  Take 1 capsule (120 mg total) by mouth once daily.     donepeziL 5 MG tablet  Commonly known as: ARICEPT  Take 5 mg by mouth every evening.     ferrous sulfate 325 mg (65 mg iron) Tab tablet  Commonly known as: FEOSOL  Take 325 mg by mouth daily with breakfast.     furosemide 40 MG tablet  Commonly known as: LASIX  Take 1 tablet (40 mg total) by mouth once daily.     LORazepam 1 MG tablet  Commonly known as: ATIVAN  Take 1 tablet (1 mg total) by  mouth 2 (two) times daily as needed for Anxiety.     metoprolol tartrate 25 MG tablet  Commonly known as: LOPRESSOR  Take 1 tablet (25 mg total) by mouth 2 (two) times daily.     metronidazole 1% 1 % Gel  Commonly known as: METROGEL  Apply topically 2 (two) times a day.     * miconazole nitrate 2% 2 % Oint  Commonly known as: MICOTIN  Apply topically 2 (two) times daily.     * miconazole NITRATE 2 % 2 % top powder  Commonly known as: MICOTIN  Apply topically 2 (two) times daily.     naproxen sodium 220 MG tablet  Commonly known as: ANAPROX  Take 220 mg by mouth 2 (two) times daily as needed.     OLANZapine 2.5 MG tablet  Commonly known as: ZyPREXA  Take 2.5 mg by mouth every evening.     oxybutynin 10 MG 24 hr tablet  Commonly known as: DITROPAN-XL  Take 1 tablet (10 mg total) by mouth once daily.     pantoprazole 40 MG tablet  Commonly known as: PROTONIX  Take 1 tablet (40 mg total) by mouth once daily.         * This list has 2 medication(s) that are the same as other medications prescribed for you. Read the directions carefully, and ask your doctor or other care provider to review them with you.                Indwelling Lines/Drains at time of discharge:   Lines/Drains/Airways     None                 Time spent on the discharge of patient: > 35 minutes  Patient was seen and examined on the date of discharge and determined to be suitable for discharge.         Tal Rebolledo NP  Department of Hospital Medicine  [facility]

## 2020-09-15 NOTE — PROGRESS NOTES
[facility]   Hematology/Oncology  Progress Note    Patient Name: Jatin Kohler  Admission Date: 9/11/2020  Hospital Length of Stay: 0 days  Code Status: DNR     Subjective:     HPI:  Mr. Jatin Kohler is a 81 yo gentleman with multiple medical problems including CHF, HTN, dyslipidemia, CVA, PVD and Afib whom was diagnosed with a  DVT of LLE in June 2020 at [facility]. Patient was discharged on anticoagulation to [facility] for 24 hour care. He did well until last month when he was hospitalized for profound anemia at [facility] from 8/19/20 to 8/24/20. During this hospitalization he was transfused and GI was consulted. He under both a EGD and colonoscopy that was notable for a normal upper endoscopy and colonoscopy identified severe diverticulosis and evidence of sigmoid resection likely also due to severe diverticulosis.     Patient found sitting alone in the lobby and is unable to explain his reason for visiting the ED. Paperwork regarding patient reports he is a resident of [facility] sent for further evaluation of H&H of 6.4/19.7. Information provided by Daughter. ED workup showed: CBC showed H/H 6.6/21.9, platelets of 142.  Was transfused 2 units RBC.    CT showed bone lesions concerning for myeloma.    Interval History:  Patient continues with mild confusion, at baseline.  Plan is to admit to hospice, appointment for 130 to meet with family at bedside.  Per review of lab results, free light chain significantly elevated which is consistent with a diagnosis of multiple myeloma.  If patient remains overnight will add a beta 2 microglobulin to the panel for confirmation.    Oncology Treatment Plan:   [No treatment plan]    Medications:  Continuous Infusions:  Scheduled Meds:   diltiaZEM  120 mg Oral Daily    donepeziL  5 mg Oral QHS    ferrous sulfate  325 mg Oral BID    furosemide  40 mg Oral Daily    metoprolol tartrate  25 mg Oral BID    OLANZapine  2.5 mg Oral  QHS    oxybutynin  10 mg Oral Daily    pantoprazole  40 mg Oral Daily     PRN Meds:sodium chloride, barium sulfate, haloperidol lactate, metoprolol, morphine, sodium chloride 0.9%     Review of Systems   Constitutional: Positive for activity change, appetite change and fatigue. Negative for chills, diaphoresis, fever and unexpected weight change.   HENT: Negative for congestion, hearing loss, nosebleeds, postnasal drip, rhinorrhea and trouble swallowing.    Eyes: Negative for discharge and visual disturbance.   Respiratory: Negative for cough, chest tightness and shortness of breath.    Cardiovascular: Negative for chest pain, palpitations and leg swelling.   Gastrointestinal: Positive for abdominal distention and nausea. Negative for abdominal pain, constipation, diarrhea and vomiting.   Endocrine: Negative for cold intolerance and heat intolerance.   Genitourinary: Negative for difficulty urinating, dysuria, frequency and hematuria.   Musculoskeletal: Positive for arthralgias, back pain and gait problem. Negative for myalgias.   Skin: Negative.    Neurological: Negative for dizziness, weakness, light-headedness and headaches.   Hematological: Negative for adenopathy. Does not bruise/bleed easily.   Psychiatric/Behavioral: Positive for agitation and confusion. Negative for behavioral problems. The patient is not nervous/anxious.      Objective:     Vital Signs (Most Recent):  Temp: 98.1 °F (36.7 °C) (09/15/20 1216)  Pulse: 89 (09/15/20 1216)  Resp: 18 (09/15/20 1216)  BP: 105/73 (09/15/20 1216)  SpO2: 100 % (09/15/20 1216) Vital Signs (24h Range):  Temp:  [97.6 °F (36.4 °C)-98.2 °F (36.8 °C)] 98.1 °F (36.7 °C)  Pulse:  [] 89  Resp:  [16-18] 18  SpO2:  [90 %-100 %] 100 %  BP: (105-131)/(66-80) 105/73     Weight: 82.1 kg (181 lb)  Body mass index is 27.12 kg/m².  Body surface area is 1.99 meters squared.      Intake/Output Summary (Last 24 hours) at 9/15/2020 1246  Last data filed at 9/15/2020 0800  Gross per  24 hour   Intake 90 ml   Output --   Net 90 ml       Physical Exam  Vitals signs and nursing note reviewed.   Constitutional:       General: He is not in acute distress.     Appearance: He is well-developed and overweight. He is not diaphoretic.   HENT:      Head: Normocephalic and atraumatic.      Right Ear: Hearing and external ear normal.      Left Ear: Hearing and external ear normal.      Nose: Nose normal. No mucosal edema or rhinorrhea.      Mouth/Throat:      Pharynx: Uvula midline.   Eyes:      General:         Right eye: No discharge.         Left eye: No discharge.      Conjunctiva/sclera: Conjunctivae normal.      Right eye: No chemosis.     Left eye: No chemosis.     Pupils: Pupils are equal, round, and reactive to light.   Neck:      Musculoskeletal: Normal range of motion and neck supple.      Thyroid: No thyroid mass or thyromegaly.      Trachea: Trachea normal.   Cardiovascular:      Rate and Rhythm: Normal rate and regular rhythm.      Pulses:           Dorsalis pedis pulses are 1+ on the right side and 1+ on the left side.      Heart sounds: Normal heart sounds. No murmur.   Pulmonary:      Effort: Pulmonary effort is normal. No respiratory distress.      Breath sounds: Examination of the right-lower field reveals decreased breath sounds. Examination of the left-lower field reveals decreased breath sounds. Decreased breath sounds present. No wheezing.   Abdominal:      General: Bowel sounds are increased. There is distension.      Palpations: Abdomen is soft.      Tenderness: There is no abdominal tenderness.   Musculoskeletal: Normal range of motion.      Thoracic back: He exhibits tenderness.      Lumbar back: He exhibits tenderness.   Lymphadenopathy:      Cervical: No cervical adenopathy.      Upper Body:      Right upper body: No supraclavicular adenopathy.      Left upper body: No supraclavicular adenopathy.   Skin:     General: Skin is warm and dry.      Capillary Refill: Capillary refill  takes less than 2 seconds.      Findings: No rash.   Neurological:      Mental Status: He is alert. Mental status is at baseline. He is disoriented.   Psychiatric:         Mood and Affect: Mood is depressed. Mood is not anxious.         Speech: Speech normal.         Behavior: Behavior normal.         Thought Content: Thought content normal.         Cognition and Memory: Cognition is impaired. Memory is impaired. He exhibits impaired recent memory and impaired remote memory.         Judgment: Judgment normal.         Significant Labs:   CBC:   Recent Labs   Lab 09/14/20 0613   WBC 2.51*  2.51*   HGB 8.9*  8.9*   HCT 28.3*  28.3*   *  124*    and CMP:   Recent Labs   Lab 09/14/20 0613      K 3.1*      CO2 29   GLU 86   BUN 22   CREATININE 1.5*   CALCIUM 8.1*   ANIONGAP 4*   EGFRNONAA 43*       Diagnostic Results:  I have reviewed all pertinent imaging results/findings within the past 24 hours.    Assessment/Plan:     Neoplasm related pain  Per review of free light chain levels, significant elevation of kappa light chains is consistent with a diagnosis of multiple myeloma.  Will order an additional lab, beta 2 microglobulin for additional confirmation if patient remains overnight.  Family notified of free light chain results and have chosen to admit to hospice.  It is reasonable to expect patient life expectancy to be less than 6 months.    Comfort measures only status  Comfort measures only is appropriate for patient at this time.    Compression fracture of T9 vertebra  Bone lesions, anemia, and BELEN part of CRAB criteria for myeloma  SPEP, SALVADOR, light chains sent.  Would workup with bone marrow biopsy tomorrow.  After bone marrow biopsy, can resume anticoagulation for a. Fib and recent DVT.    --free light chain results received, from review of results this is likely multiple myeloma at an advanced stage.  Treatment would include some form of chemotherapy which patient would not tolerate in  his current state.  After extensive conversation with patient's daughter today at the patient's bedside, family is not wanting to pursue with any form of treatment due to his current condition.  Also discussed performing a bone marrow biopsy, daughter would rather not put her father through any procedures at this point.  --consulted palliative Medicine for advanced care planning and possible hospice.  Hospice would be in patient's best interest at this time.        Thank you for your consult. I will follow-up with patient. Please contact us if you have any additional questions.     Ines Bynum NP  Hematology/Oncology  [facility]

## 2020-09-15 NOTE — CONSULTS
CM contacted patient's daughter, Kenya, to discuss hospice care.  CM also discussed using hospice at the facility that patient currently resides, which is [facility].  Kenya stated that she feels that it would be in her fathers best interest to have him go back to [facility] instead of trying to get him into a different facility.  [facility] that [facility] by [facility] and Kenya is agreeable for CM to make the referral to [facility]. Choice form completed/witnessed and placed in the patient blue folder. CM will make the referral via [facility].

## 2020-09-15 NOTE — SUBJECTIVE & OBJECTIVE
Interval History:  Patient continues with mild confusion, at baseline.  Plan is to admit to hospice, appointment for 130 to meet with family at bedside.  Per review of lab results, free light chain significantly elevated which is consistent with a diagnosis of multiple myeloma.  If patient remains overnight will add a beta 2 microglobulin to the panel for confirmation.    Oncology Treatment Plan:   [No treatment plan]    Medications:  Continuous Infusions:  Scheduled Meds:   diltiaZEM  120 mg Oral Daily    donepeziL  5 mg Oral QHS    ferrous sulfate  325 mg Oral BID    furosemide  40 mg Oral Daily    metoprolol tartrate  25 mg Oral BID    OLANZapine  2.5 mg Oral QHS    oxybutynin  10 mg Oral Daily    pantoprazole  40 mg Oral Daily     PRN Meds:sodium chloride, barium sulfate, haloperidol lactate, metoprolol, morphine, sodium chloride 0.9%     Review of Systems   Constitutional: Positive for activity change, appetite change and fatigue. Negative for chills, diaphoresis, fever and unexpected weight change.   HENT: Negative for congestion, hearing loss, nosebleeds, postnasal drip, rhinorrhea and trouble swallowing.    Eyes: Negative for discharge and visual disturbance.   Respiratory: Negative for cough, chest tightness and shortness of breath.    Cardiovascular: Negative for chest pain, palpitations and leg swelling.   Gastrointestinal: Positive for abdominal distention and nausea. Negative for abdominal pain, constipation, diarrhea and vomiting.   Endocrine: Negative for cold intolerance and heat intolerance.   Genitourinary: Negative for difficulty urinating, dysuria, frequency and hematuria.   Musculoskeletal: Positive for arthralgias, back pain and gait problem. Negative for myalgias.   Skin: Negative.    Neurological: Negative for dizziness, weakness, light-headedness and headaches.   Hematological: Negative for adenopathy. Does not bruise/bleed easily.   Psychiatric/Behavioral: Positive for agitation and  confusion. Negative for behavioral problems. The patient is not nervous/anxious.      Objective:     Vital Signs (Most Recent):  Temp: 98.1 °F (36.7 °C) (09/15/20 1216)  Pulse: 89 (09/15/20 1216)  Resp: 18 (09/15/20 1216)  BP: 105/73 (09/15/20 1216)  SpO2: 100 % (09/15/20 1216) Vital Signs (24h Range):  Temp:  [97.6 °F (36.4 °C)-98.2 °F (36.8 °C)] 98.1 °F (36.7 °C)  Pulse:  [] 89  Resp:  [16-18] 18  SpO2:  [90 %-100 %] 100 %  BP: (105-131)/(66-80) 105/73     Weight: 82.1 kg (181 lb)  Body mass index is 27.12 kg/m².  Body surface area is 1.99 meters squared.      Intake/Output Summary (Last 24 hours) at 9/15/2020 1246  Last data filed at 9/15/2020 0800  Gross per 24 hour   Intake 90 ml   Output --   Net 90 ml       Physical Exam  Vitals signs and nursing note reviewed.   Constitutional:       General: He is not in acute distress.     Appearance: He is well-developed and overweight. He is not diaphoretic.   HENT:      Head: Normocephalic and atraumatic.      Right Ear: Hearing and external ear normal.      Left Ear: Hearing and external ear normal.      Nose: Nose normal. No mucosal edema or rhinorrhea.      Mouth/Throat:      Pharynx: Uvula midline.   Eyes:      General:         Right eye: No discharge.         Left eye: No discharge.      Conjunctiva/sclera: Conjunctivae normal.      Right eye: No chemosis.     Left eye: No chemosis.     Pupils: Pupils are equal, round, and reactive to light.   Neck:      Musculoskeletal: Normal range of motion and neck supple.      Thyroid: No thyroid mass or thyromegaly.      Trachea: Trachea normal.   Cardiovascular:      Rate and Rhythm: Normal rate and regular rhythm.      Pulses:           Dorsalis pedis pulses are 1+ on the right side and 1+ on the left side.      Heart sounds: Normal heart sounds. No murmur.   Pulmonary:      Effort: Pulmonary effort is normal. No respiratory distress.      Breath sounds: Examination of the right-lower field reveals decreased breath  sounds. Examination of the left-lower field reveals decreased breath sounds. Decreased breath sounds present. No wheezing.   Abdominal:      General: Bowel sounds are increased. There is distension.      Palpations: Abdomen is soft.      Tenderness: There is no abdominal tenderness.   Musculoskeletal: Normal range of motion.      Thoracic back: He exhibits tenderness.      Lumbar back: He exhibits tenderness.   Lymphadenopathy:      Cervical: No cervical adenopathy.      Upper Body:      Right upper body: No supraclavicular adenopathy.      Left upper body: No supraclavicular adenopathy.   Skin:     General: Skin is warm and dry.      Capillary Refill: Capillary refill takes less than 2 seconds.      Findings: No rash.   Neurological:      Mental Status: He is alert. Mental status is at baseline. He is disoriented.   Psychiatric:         Mood and Affect: Mood is depressed. Mood is not anxious.         Speech: Speech normal.         Behavior: Behavior normal.         Thought Content: Thought content normal.         Cognition and Memory: Cognition is impaired. Memory is impaired. He exhibits impaired recent memory and impaired remote memory.         Judgment: Judgment normal.         Significant Labs:   CBC:   Recent Labs   Lab 09/14/20  0613   WBC 2.51*  2.51*   HGB 8.9*  8.9*   HCT 28.3*  28.3*   *  124*    and CMP:   Recent Labs   Lab 09/14/20  0613      K 3.1*      CO2 29   GLU 86   BUN 22   CREATININE 1.5*   CALCIUM 8.1*   ANIONGAP 4*   EGFRNONAA 43*       Diagnostic Results:  I have reviewed all pertinent imaging results/findings within the past 24 hours.

## 2020-09-15 NOTE — PLAN OF CARE
CYNDY spoke to Kelli with [facility].  [facility] will meet with patient's daughter, Kenya, today at 1:30 at the bedside.

## 2020-09-15 NOTE — NURSING
Patient stable for discharge. IV removed. Report given to nurse Roca at [facility]. Patient transported with oxygen and wheelchair.

## 2020-09-15 NOTE — PT/OT/SLP PROGRESS
Occupational Therapy      Patient Name:  Jatin Kohelr   MRN:  [id number]    OT fady initiated through chart review, spoke to Tal Rebolledo NP about pt since he will be discharging to NH with Hospice. NP said to discontinue orders and he will discharge orders in system.     Jil Chavez, PT/OT  9/15/2020

## 2020-09-15 NOTE — ASSESSMENT & PLAN NOTE
Per review of free light chain levels, significant elevation of kappa light chains is consistent with a diagnosis of multiple myeloma.  Will order an additional lab, beta 2 microglobulin for additional confirmation if patient remains overnight.  Family notified of free light chain results and have chosen to admit to hospice.  It is reasonable to expect patient life expectancy to be less than 6 months.

## 2020-09-16 NOTE — PLAN OF CARE
09/16/20 0804   Final Note   Assessment Type Final Discharge Note   Anticipated Discharge Disposition [facility]   Right Care Referral Info   Facility Name [facility]

## 2021-11-06 NOTE — TELEPHONE ENCOUNTER
----- Message from Beba Phillips sent at 7/31/2019  3:08 PM CDT -----  Contact: Kenya Soriano   .Type:  Patient Returning Call    Who Called: Kenya Soriano   Who Left Message for Patient:Kristina  Does the patient know what this is regarding?:  Would the patient rather a call back or a response via [id number]?  Call back  Best Call Back Number: [phone]  Additional Information:     
----- Message from Lauren Ng sent at 7/31/2019 12:47 PM CDT -----  Type:  RX Refill Request    Who Called: pt daughter ([place])  Refill or New Rx: Refill  RX Name and Strength:  Aspirin EC 81mg    How is the patient currently taking it? (ex. 1XDay): takes once daily  Is this a 30 day or 90 day RX:   Preferred Pharmacy with phone number:   Local or Mail Order: Local  Ordering Provider:  Dr Saldaña  Would the patient rather a call back or a response via [facility]?   Call back  Best Call Back Number:  [phone]  Additional Information:  States she would like to  Know if she can buy the Aspirin over the counter or does he need a script//please call//thanks//[facility]  
LM on VM for patient to return call.   
Notified patients daughter ASA 81MG can be purchased OTC.   
DC instructions

## 2022-01-04 NOTE — TELEPHONE ENCOUNTER
Procedure Scheduling Information    Procedure:  Colonoscopy and EGD  Last Procedure:  EGD 7/14/21, colon 5/23/18  Procedure Date:  1/10/22  Procedure Time:  9:00 AM  Reason:  Rectal bleeding, history of colon polyps, diverticulosis, dysphagia  Referring provider:  Paul Urbina  Location: [facility] [place]  Prep:  Nulytely (2 day prep)  Sedation:  MAC     If MAC, why:  Multiple Comorbidities  Pacemaker/Defibrillator:  no     Device Interrogation Form Faxed:  n/a  Anticoagulation:  no    Prescribing Provider:  n/a   Spoke with patient's daughter and she says he needs Ativan a few tablets a month to help him with his anxiety and anger issues   Please advise